# Patient Record
Sex: MALE | Employment: FULL TIME | URBAN - METROPOLITAN AREA
[De-identification: names, ages, dates, MRNs, and addresses within clinical notes are randomized per-mention and may not be internally consistent; named-entity substitution may affect disease eponyms.]

---

## 2020-03-13 ENCOUNTER — HOSPITAL ENCOUNTER (INPATIENT)
Age: 65
LOS: 16 days | Discharge: HOME HEALTH | DRG: 233 | End: 2020-03-29
Attending: INTERNAL MEDICINE | Admitting: INTERNAL MEDICINE
Payer: COMMERCIAL

## 2020-03-13 ENCOUNTER — APPOINTMENT (OUTPATIENT)
Dept: GENERAL RADIOLOGY | Age: 65
DRG: 233 | End: 2020-03-13
Attending: INTERNAL MEDICINE
Payer: COMMERCIAL

## 2020-03-13 PROBLEM — I10 HTN (HYPERTENSION): Status: ACTIVE | Noted: 2020-03-13

## 2020-03-13 PROBLEM — E11.65 UNCONTROLLED TYPE 2 DIABETES MELLITUS WITH HYPERGLYCEMIA (HCC): Status: ACTIVE | Noted: 2020-03-13

## 2020-03-13 PROBLEM — I50.9 CONGESTIVE HEART FAILURE OF UNKNOWN ETIOLOGY (HCC): Status: ACTIVE | Noted: 2020-03-13

## 2020-03-13 PROBLEM — I25.10 CAD (CORONARY ARTERY DISEASE): Status: ACTIVE | Noted: 2020-03-13

## 2020-03-13 PROBLEM — J96.01 ACUTE RESPIRATORY FAILURE WITH HYPOXIA (HCC): Status: ACTIVE | Noted: 2020-03-13

## 2020-03-13 PROBLEM — I50.21 ACUTE SYSTOLIC (CONGESTIVE) HEART FAILURE (HCC): Status: ACTIVE | Noted: 2020-03-13

## 2020-03-13 PROBLEM — I21.4 NSTEMI (NON-ST ELEVATED MYOCARDIAL INFARCTION) (HCC): Status: ACTIVE | Noted: 2020-03-13

## 2020-03-13 PROBLEM — J69.0 ASPIRATION PNEUMONIA (HCC): Status: ACTIVE | Noted: 2020-03-13

## 2020-03-13 LAB
ANION GAP SERPL CALCULATED.3IONS-SCNC: 10 MMOL/L (ref 3–16)
BASE EXCESS ARTERIAL: -0.1 MMOL/L (ref -3–3)
BASOPHILS ABSOLUTE: 0 K/UL (ref 0–0.2)
BASOPHILS RELATIVE PERCENT: 0.2 %
BUN BLDV-MCNC: 14 MG/DL (ref 7–20)
CALCIUM SERPL-MCNC: 8.1 MG/DL (ref 8.3–10.6)
CARBOXYHEMOGLOBIN ARTERIAL: 0.7 % (ref 0–1.5)
CHLORIDE BLD-SCNC: 107 MMOL/L (ref 99–110)
CO2: 25 MMOL/L (ref 21–32)
CREAT SERPL-MCNC: 0.8 MG/DL (ref 0.8–1.3)
EOSINOPHILS ABSOLUTE: 0.3 K/UL (ref 0–0.6)
EOSINOPHILS RELATIVE PERCENT: 2.8 %
GFR AFRICAN AMERICAN: >60
GFR NON-AFRICAN AMERICAN: >60
GLUCOSE BLD-MCNC: 118 MG/DL (ref 70–99)
GLUCOSE BLD-MCNC: 124 MG/DL (ref 70–99)
GLUCOSE BLD-MCNC: 155 MG/DL (ref 70–99)
HCO3 ARTERIAL: 25.6 MMOL/L (ref 21–29)
HCT VFR BLD CALC: 38.8 % (ref 40.5–52.5)
HEMOGLOBIN, ART, EXTENDED: 13.1 G/DL (ref 13.5–17.5)
HEMOGLOBIN: 13 G/DL (ref 13.5–17.5)
INR BLD: 1.14 (ref 0.86–1.14)
LACTIC ACID: 0.7 MMOL/L (ref 0.4–2)
LYMPHOCYTES ABSOLUTE: 0.9 K/UL (ref 1–5.1)
LYMPHOCYTES RELATIVE PERCENT: 9.5 %
MCH RBC QN AUTO: 30.7 PG (ref 26–34)
MCHC RBC AUTO-ENTMCNC: 33.6 G/DL (ref 31–36)
MCV RBC AUTO: 91.4 FL (ref 80–100)
METHEMOGLOBIN ARTERIAL: 0.3 %
MONOCYTES ABSOLUTE: 1 K/UL (ref 0–1.3)
MONOCYTES RELATIVE PERCENT: 10.5 %
NEUTROPHILS ABSOLUTE: 7.1 K/UL (ref 1.7–7.7)
NEUTROPHILS RELATIVE PERCENT: 77 %
O2 CONTENT ARTERIAL: 18 ML/DL
O2 SAT, ARTERIAL: 96.9 %
O2 THERAPY: ABNORMAL
PCO2 ARTERIAL: 45.1 MMHG (ref 35–45)
PDW BLD-RTO: 13.4 % (ref 12.4–15.4)
PERFORMED ON: ABNORMAL
PERFORMED ON: ABNORMAL
PH ARTERIAL: 7.36 (ref 7.35–7.45)
PLATELET # BLD: 190 K/UL (ref 135–450)
PMV BLD AUTO: 9.1 FL (ref 5–10.5)
PO2 ARTERIAL: 89 MMHG (ref 75–108)
POTASSIUM REFLEX MAGNESIUM: 4.3 MMOL/L (ref 3.5–5.1)
PRO-BNP: 501 PG/ML (ref 0–124)
PROTHROMBIN TIME: 13.2 SEC (ref 10–13.2)
RBC # BLD: 4.25 M/UL (ref 4.2–5.9)
SODIUM BLD-SCNC: 142 MMOL/L (ref 136–145)
TCO2 ARTERIAL: 60.5 MMOL/L
TROPONIN: 0.26 NG/ML
WBC # BLD: 9.2 K/UL (ref 4–11)

## 2020-03-13 PROCEDURE — 94750 HC PULMONARY COMPLIANCE STUDY: CPT

## 2020-03-13 PROCEDURE — 5A1955Z RESPIRATORY VENTILATION, GREATER THAN 96 CONSECUTIVE HOURS: ICD-10-PCS | Performed by: INTERNAL MEDICINE

## 2020-03-13 PROCEDURE — 71045 X-RAY EXAM CHEST 1 VIEW: CPT

## 2020-03-13 PROCEDURE — 85025 COMPLETE CBC W/AUTO DIFF WBC: CPT

## 2020-03-13 PROCEDURE — 87205 SMEAR GRAM STAIN: CPT

## 2020-03-13 PROCEDURE — 2000000000 HC ICU R&B

## 2020-03-13 PROCEDURE — 99291 CRITICAL CARE FIRST HOUR: CPT | Performed by: INTERNAL MEDICINE

## 2020-03-13 PROCEDURE — 87040 BLOOD CULTURE FOR BACTERIA: CPT

## 2020-03-13 PROCEDURE — 93005 ELECTROCARDIOGRAM TRACING: CPT | Performed by: INTERNAL MEDICINE

## 2020-03-13 PROCEDURE — 82803 BLOOD GASES ANY COMBINATION: CPT

## 2020-03-13 PROCEDURE — 2500000003 HC RX 250 WO HCPCS: Performed by: INTERNAL MEDICINE

## 2020-03-13 PROCEDURE — 2580000003 HC RX 258: Performed by: INTERNAL MEDICINE

## 2020-03-13 PROCEDURE — 80048 BASIC METABOLIC PNL TOTAL CA: CPT

## 2020-03-13 PROCEDURE — 94761 N-INVAS EAR/PLS OXIMETRY MLT: CPT

## 2020-03-13 PROCEDURE — 83036 HEMOGLOBIN GLYCOSYLATED A1C: CPT

## 2020-03-13 PROCEDURE — 94002 VENT MGMT INPAT INIT DAY: CPT

## 2020-03-13 PROCEDURE — 87070 CULTURE OTHR SPECIMN AEROBIC: CPT

## 2020-03-13 PROCEDURE — 85610 PROTHROMBIN TIME: CPT

## 2020-03-13 PROCEDURE — 6360000002 HC RX W HCPCS: Performed by: INTERNAL MEDICINE

## 2020-03-13 PROCEDURE — 83880 ASSAY OF NATRIURETIC PEPTIDE: CPT

## 2020-03-13 PROCEDURE — 83605 ASSAY OF LACTIC ACID: CPT

## 2020-03-13 PROCEDURE — 84484 ASSAY OF TROPONIN QUANT: CPT

## 2020-03-13 RX ORDER — POLYETHYLENE GLYCOL 3350 17 G/17G
17 POWDER, FOR SOLUTION ORAL DAILY PRN
Status: DISCONTINUED | OUTPATIENT
Start: 2020-03-13 | End: 2020-03-24

## 2020-03-13 RX ORDER — POTASSIUM CHLORIDE 7.45 MG/ML
10 INJECTION INTRAVENOUS PRN
Status: DISCONTINUED | OUTPATIENT
Start: 2020-03-13 | End: 2020-03-18 | Stop reason: DRUGHIGH

## 2020-03-13 RX ORDER — ONDANSETRON 2 MG/ML
4 INJECTION INTRAMUSCULAR; INTRAVENOUS EVERY 6 HOURS PRN
Status: DISCONTINUED | OUTPATIENT
Start: 2020-03-13 | End: 2020-03-24

## 2020-03-13 RX ORDER — ACETAMINOPHEN 650 MG/1
650 SUPPOSITORY RECTAL EVERY 6 HOURS PRN
Status: DISCONTINUED | OUTPATIENT
Start: 2020-03-13 | End: 2020-03-24

## 2020-03-13 RX ORDER — INSULIN LISPRO 100 [IU]/ML
0-12 INJECTION, SOLUTION INTRAVENOUS; SUBCUTANEOUS EVERY 4 HOURS
Status: DISCONTINUED | OUTPATIENT
Start: 2020-03-13 | End: 2020-03-16

## 2020-03-13 RX ORDER — SODIUM CHLORIDE 0.9 % (FLUSH) 0.9 %
10 SYRINGE (ML) INJECTION PRN
Status: DISCONTINUED | OUTPATIENT
Start: 2020-03-13 | End: 2020-03-24

## 2020-03-13 RX ORDER — ACETAMINOPHEN 325 MG/1
650 TABLET ORAL EVERY 6 HOURS PRN
Status: DISCONTINUED | OUTPATIENT
Start: 2020-03-13 | End: 2020-03-24

## 2020-03-13 RX ORDER — SODIUM CHLORIDE 9 MG/ML
INJECTION, SOLUTION INTRAVENOUS CONTINUOUS
Status: DISCONTINUED | OUTPATIENT
Start: 2020-03-13 | End: 2020-03-13

## 2020-03-13 RX ORDER — FUROSEMIDE 10 MG/ML
20 INJECTION INTRAMUSCULAR; INTRAVENOUS 2 TIMES DAILY
Status: DISCONTINUED | OUTPATIENT
Start: 2020-03-14 | End: 2020-03-14

## 2020-03-13 RX ORDER — DEXTROSE MONOHYDRATE 50 MG/ML
100 INJECTION, SOLUTION INTRAVENOUS PRN
Status: DISCONTINUED | OUTPATIENT
Start: 2020-03-13 | End: 2020-03-24

## 2020-03-13 RX ORDER — LORAZEPAM 2 MG/ML
2 INJECTION INTRAMUSCULAR
Status: DISCONTINUED | OUTPATIENT
Start: 2020-03-13 | End: 2020-03-18

## 2020-03-13 RX ORDER — DEXTROSE MONOHYDRATE 25 G/50ML
12.5 INJECTION, SOLUTION INTRAVENOUS PRN
Status: DISCONTINUED | OUTPATIENT
Start: 2020-03-13 | End: 2020-03-24

## 2020-03-13 RX ORDER — PROPOFOL 10 MG/ML
10 INJECTION, EMULSION INTRAVENOUS
Status: DISCONTINUED | OUTPATIENT
Start: 2020-03-13 | End: 2020-03-18

## 2020-03-13 RX ORDER — NICOTINE POLACRILEX 4 MG
15 LOZENGE BUCCAL PRN
Status: DISCONTINUED | OUTPATIENT
Start: 2020-03-13 | End: 2020-03-16

## 2020-03-13 RX ORDER — MAGNESIUM SULFATE IN WATER 40 MG/ML
2 INJECTION, SOLUTION INTRAVENOUS PRN
Status: DISCONTINUED | OUTPATIENT
Start: 2020-03-13 | End: 2020-03-23

## 2020-03-13 RX ORDER — PROMETHAZINE HYDROCHLORIDE 25 MG/1
12.5 TABLET ORAL EVERY 6 HOURS PRN
Status: DISCONTINUED | OUTPATIENT
Start: 2020-03-13 | End: 2020-03-24

## 2020-03-13 RX ORDER — SODIUM CHLORIDE 0.9 % (FLUSH) 0.9 %
10 SYRINGE (ML) INJECTION EVERY 12 HOURS SCHEDULED
Status: DISCONTINUED | OUTPATIENT
Start: 2020-03-13 | End: 2020-03-24

## 2020-03-13 RX ADMIN — PROPOFOL 20 MCG/KG/MIN: 10 INJECTION, EMULSION INTRAVENOUS at 20:37

## 2020-03-13 RX ADMIN — Medication 2 MCG/MIN: at 23:19

## 2020-03-13 RX ADMIN — PIPERACILLIN AND TAZOBACTAM 3.38 G: 3; .375 INJECTION, POWDER, LYOPHILIZED, FOR SOLUTION INTRAVENOUS at 22:22

## 2020-03-13 RX ADMIN — Medication 10 ML: at 22:24

## 2020-03-13 RX ADMIN — FENTANYL CITRATE 1 MCG/KG/HR: 50 INJECTION, SOLUTION INTRAMUSCULAR; INTRAVENOUS at 20:38

## 2020-03-13 RX ADMIN — SODIUM CHLORIDE: 9 INJECTION, SOLUTION INTRAVENOUS at 20:44

## 2020-03-13 RX ADMIN — FAMOTIDINE 20 MG: 10 INJECTION INTRAVENOUS at 22:23

## 2020-03-13 ASSESSMENT — PULMONARY FUNCTION TESTS
PIF_VALUE: 23
PIF_VALUE: 21
PIF_VALUE: 23
PIF_VALUE: 29

## 2020-03-14 LAB
ANION GAP SERPL CALCULATED.3IONS-SCNC: 9 MMOL/L (ref 3–16)
BASOPHILS ABSOLUTE: 0 K/UL (ref 0–0.2)
BASOPHILS RELATIVE PERCENT: 0.3 %
BUN BLDV-MCNC: 12 MG/DL (ref 7–20)
CALCIUM SERPL-MCNC: 8 MG/DL (ref 8.3–10.6)
CHLORIDE BLD-SCNC: 107 MMOL/L (ref 99–110)
CO2: 25 MMOL/L (ref 21–32)
CREAT SERPL-MCNC: 0.8 MG/DL (ref 0.8–1.3)
EKG ATRIAL RATE: 77 BPM
EKG DIAGNOSIS: NORMAL
EKG P AXIS: 31 DEGREES
EKG P-R INTERVAL: 178 MS
EKG Q-T INTERVAL: 400 MS
EKG QRS DURATION: 94 MS
EKG QTC CALCULATION (BAZETT): 452 MS
EKG R AXIS: 19 DEGREES
EKG T AXIS: 147 DEGREES
EKG VENTRICULAR RATE: 77 BPM
EOSINOPHILS ABSOLUTE: 0.5 K/UL (ref 0–0.6)
EOSINOPHILS RELATIVE PERCENT: 5.1 %
GFR AFRICAN AMERICAN: >60
GFR NON-AFRICAN AMERICAN: >60
GLUCOSE BLD-MCNC: 126 MG/DL (ref 70–99)
GLUCOSE BLD-MCNC: 136 MG/DL (ref 70–99)
GLUCOSE BLD-MCNC: 146 MG/DL (ref 70–99)
GLUCOSE BLD-MCNC: 153 MG/DL (ref 70–99)
GLUCOSE BLD-MCNC: 159 MG/DL (ref 70–99)
GLUCOSE BLD-MCNC: 226 MG/DL (ref 70–99)
HCT VFR BLD CALC: 37.5 % (ref 40.5–52.5)
HEMOGLOBIN: 12.5 G/DL (ref 13.5–17.5)
LV EF: 40 %
LVEF MODALITY: NORMAL
LYMPHOCYTES ABSOLUTE: 1.6 K/UL (ref 1–5.1)
LYMPHOCYTES RELATIVE PERCENT: 17.3 %
MCH RBC QN AUTO: 30.7 PG (ref 26–34)
MCHC RBC AUTO-ENTMCNC: 33.4 G/DL (ref 31–36)
MCV RBC AUTO: 91.9 FL (ref 80–100)
MONOCYTES ABSOLUTE: 0.9 K/UL (ref 0–1.3)
MONOCYTES RELATIVE PERCENT: 10.1 %
NEUTROPHILS ABSOLUTE: 6.1 K/UL (ref 1.7–7.7)
NEUTROPHILS RELATIVE PERCENT: 67.2 %
PDW BLD-RTO: 13.1 % (ref 12.4–15.4)
PERFORMED ON: ABNORMAL
PLATELET # BLD: 214 K/UL (ref 135–450)
PMV BLD AUTO: 9.3 FL (ref 5–10.5)
POTASSIUM REFLEX MAGNESIUM: 4 MMOL/L (ref 3.5–5.1)
RBC # BLD: 4.08 M/UL (ref 4.2–5.9)
SODIUM BLD-SCNC: 141 MMOL/L (ref 136–145)
WBC # BLD: 9.1 K/UL (ref 4–11)

## 2020-03-14 PROCEDURE — 94003 VENT MGMT INPAT SUBQ DAY: CPT

## 2020-03-14 PROCEDURE — 80048 BASIC METABOLIC PNL TOTAL CA: CPT

## 2020-03-14 PROCEDURE — 6360000002 HC RX W HCPCS: Performed by: INTERNAL MEDICINE

## 2020-03-14 PROCEDURE — 85025 COMPLETE CBC W/AUTO DIFF WBC: CPT

## 2020-03-14 PROCEDURE — 2700000000 HC OXYGEN THERAPY PER DAY

## 2020-03-14 PROCEDURE — 99291 CRITICAL CARE FIRST HOUR: CPT | Performed by: INTERNAL MEDICINE

## 2020-03-14 PROCEDURE — C8929 TTE W OR WO FOL WCON,DOPPLER: HCPCS

## 2020-03-14 PROCEDURE — 2500000003 HC RX 250 WO HCPCS: Performed by: INTERNAL MEDICINE

## 2020-03-14 PROCEDURE — 2000000000 HC ICU R&B

## 2020-03-14 PROCEDURE — 2580000003 HC RX 258: Performed by: INTERNAL MEDICINE

## 2020-03-14 PROCEDURE — 94750 HC PULMONARY COMPLIANCE STUDY: CPT

## 2020-03-14 PROCEDURE — 94770 HC ETCO2 MONITOR DAILY: CPT

## 2020-03-14 PROCEDURE — 93010 ELECTROCARDIOGRAM REPORT: CPT | Performed by: INTERNAL MEDICINE

## 2020-03-14 PROCEDURE — 6370000000 HC RX 637 (ALT 250 FOR IP): Performed by: INTERNAL MEDICINE

## 2020-03-14 PROCEDURE — 94761 N-INVAS EAR/PLS OXIMETRY MLT: CPT

## 2020-03-14 RX ORDER — FUROSEMIDE 10 MG/ML
40 INJECTION INTRAMUSCULAR; INTRAVENOUS 2 TIMES DAILY
Status: DISCONTINUED | OUTPATIENT
Start: 2020-03-14 | End: 2020-03-17

## 2020-03-14 RX ADMIN — FAMOTIDINE 20 MG: 10 INJECTION INTRAVENOUS at 20:53

## 2020-03-14 RX ADMIN — PIPERACILLIN AND TAZOBACTAM 3.38 G: 3; .375 INJECTION, POWDER, LYOPHILIZED, FOR SOLUTION INTRAVENOUS at 13:17

## 2020-03-14 RX ADMIN — FUROSEMIDE 40 MG: 10 INJECTION, SOLUTION INTRAMUSCULAR; INTRAVENOUS at 16:18

## 2020-03-14 RX ADMIN — INSULIN LISPRO 2 UNITS: 100 INJECTION, SOLUTION INTRAVENOUS; SUBCUTANEOUS at 12:14

## 2020-03-14 RX ADMIN — FENTANYL CITRATE 1 MCG/KG/HR: 50 INJECTION, SOLUTION INTRAMUSCULAR; INTRAVENOUS at 19:13

## 2020-03-14 RX ADMIN — Medication 10 ML: at 09:18

## 2020-03-14 RX ADMIN — FENTANYL CITRATE 1 MCG/KG/HR: 50 INJECTION, SOLUTION INTRAMUSCULAR; INTRAVENOUS at 06:34

## 2020-03-14 RX ADMIN — FAMOTIDINE 20 MG: 10 INJECTION INTRAVENOUS at 09:17

## 2020-03-14 RX ADMIN — PROPOFOL 30 MCG/KG/MIN: 10 INJECTION, EMULSION INTRAVENOUS at 21:02

## 2020-03-14 RX ADMIN — Medication 2 MCG/MIN: at 16:51

## 2020-03-14 RX ADMIN — INSULIN LISPRO 4 UNITS: 100 INJECTION, SOLUTION INTRAVENOUS; SUBCUTANEOUS at 20:59

## 2020-03-14 RX ADMIN — PROPOFOL 30 MCG/KG/MIN: 10 INJECTION, EMULSION INTRAVENOUS at 01:47

## 2020-03-14 RX ADMIN — PIPERACILLIN AND TAZOBACTAM 3.38 G: 3; .375 INJECTION, POWDER, LYOPHILIZED, FOR SOLUTION INTRAVENOUS at 04:51

## 2020-03-14 RX ADMIN — PROPOFOL 30 MCG/KG/MIN: 10 INJECTION, EMULSION INTRAVENOUS at 16:18

## 2020-03-14 RX ADMIN — ENOXAPARIN SODIUM 40 MG: 40 INJECTION SUBCUTANEOUS at 09:17

## 2020-03-14 RX ADMIN — FUROSEMIDE 20 MG: 10 INJECTION, SOLUTION INTRAMUSCULAR; INTRAVENOUS at 09:17

## 2020-03-14 RX ADMIN — Medication 10 ML: at 20:54

## 2020-03-14 RX ADMIN — INSULIN LISPRO 2 UNITS: 100 INJECTION, SOLUTION INTRAVENOUS; SUBCUTANEOUS at 16:18

## 2020-03-14 ASSESSMENT — PULMONARY FUNCTION TESTS
PIF_VALUE: 22
PIF_VALUE: 19
PIF_VALUE: 25
PIF_VALUE: 24
PIF_VALUE: 23
PIF_VALUE: 26
PIF_VALUE: 18
PIF_VALUE: 26

## 2020-03-14 NOTE — PROGRESS NOTES
Piggyback:50]  Out: 7586 [Urine:1125; Emesis/NG output:40]  I/O this shift:  In: -   Out: 1275 [Urine:1275]    General Appearance: Intubated and sedated, in no acute distress  Skin: warm and dry, no rash or erythema  Head: normocephalic and atraumatic  Eyes: pupils equal, round, and reactive to light, extraocular eye movements intact, conjunctivae normal  ENT: external ear and ear canal normal bilaterally, nose without deformity, nasal mucosa and turbinates normal  Neck: supple and non-tender without mass, no cervical lymphadenopathy  Pulmonary/Chest: clear to auscultation bilaterally- no wheezes, rales or rhonchi, normal air movement, no respiratory distress  Cardiovascular: normal rate, regular rhythm,  no murmurs, rubs, distal pulses intact, no carotid bruits  Abdomen: soft, non-tender, non-distended, normal bowel sounds, no masses or organomegaly  Lymph Nodes: Cervical, supraclavicular normal  Extremities: no cyanosis, clubbing or edema  Musculoskeletal: normal range of motion, no joint swelling, deformity or tenderness  Neurologic: Intubated, no focal neurologic deficits    Data Review:  CBC:   Lab Results   Component Value Date    WBC 9.1 03/14/2020    RBC 4.08 03/14/2020     BMP:   Lab Results   Component Value Date    GLUCOSE 146 03/14/2020    CO2 25 03/14/2020    BUN 12 03/14/2020    CREATININE 0.8 03/14/2020    CALCIUM 8.0 03/14/2020     ABG:   Lab Results   Component Value Date    RSH9GWE 25.6 03/13/2020    BEART -0.1 03/13/2020    I3QBPEQU 96.9 03/13/2020    PHART 7.363 03/13/2020    XAS2TML 45.1 03/13/2020    PO2ART 89.0 03/13/2020    WAF4ZOT 60.5 03/13/2020       Radiology: All pertinent images / reports were reviewed as a part of this visit.     Narrative   EXAMINATION:   ONE XRAY VIEW OF THE CHEST       3/13/2020 7:44 pm       COMPARISON:   None.       HISTORY:   ORDERING SYSTEM PROVIDED HISTORY: SOB , intubated   TECHNOLOGIST PROVIDED HISTORY:   Reason for exam:->SOB , intubated   Reason for Exam: SOB , intubated   Acuity: Acute   Type of Exam: Subsequent/Follow-up       FINDINGS:   SUPPORT DEVICES:       Endotracheal tube in place with tip terminating approximately 2.5 cm above   the deric.       Enteric tube noted with side holes terminating at the GE junction, recommend   advancing by 5 cm.       Right upper extremity PICC noted with tip terminating at the cavoatrial   junction.       Multiple leads overlying the right lower chest somewhat limit evaluation.       The cardiopericardial silhouette is mildly enlarged.  There is perihilar   fullness with bibasilar opacities likely reflecting pleural effusions,   moderate on the right and small on the left.  Overall radiographic findings   are most suggestive of pulmonary edema/CHF.       There is no pneumothorax.           Impression   1. Radiographic findings most suggestive of pulmonary edema/CHF. 2. Recommend advancing enteric tube by 5 cm. 3. Additional support devices as described above. Problem List:   NSTEMI/triple-vessel disease  Pulmonary edema  Acute hypoxic respiratory failure  Assessment/Plan:     Acute hypoxic respiratory failure related to pulmonary edema as noted on chest x-ray. Continue with IV diuresis, increase Lasix to 40 mg twice daily. Adequate urine output noted. Continue on volume assist control mode of ventilation which patient is tolerating well. Repeat chest x-ray tomorrow. Will need to clarify plans for surgery prior to extubation. Continue with spontaneous breathing trial and awakening trials. Acute inferior MI/triple-vessel disease, with pulmonary edema. Cardiac cath performed at [facility]. ? Need for revascularization of RCA and LAD vs CABG. Cardiology will discuss with CT surgery. EF of 25 to 30%. Doubt aspiration pneumonia, stop antibiotics. Lovenox and Pepcid for prophylaxis. Critical care team will follow.     Roni Irizarry MD     Critical care time of 35 minutes excluding procedures

## 2020-03-14 NOTE — CONSULTS
Pulmonary Consult    Patient's PCP: No primary care provider on file. Referred by: Vannesa Bishop MD for respiratory failure     HISTORY OF PRESENT ILLNESS:    This is a  60 y.o. male, presented to an outside hospital two days ago with chest pain and SOB progressively got worse over 1 week and was found to have CHF and elevated troponin for which he was intubated. He had left heart cath which showed three vessel disease for which he was transferred to us on vent for evaluation for GABG. At the time of evaluation he was sedated with ativan 3 mg/hr. He open eyes to touch and trach but does not follow commands. Past Medical / Surgical History:   No past medical history on file. No past surgical history on file. Medications Prior to Admission:    No current facility-administered medications on file prior to encounter. No current outpatient medications on file prior to encounter. Allergies:  Patient has no allergy information on record. Social History:   TOBACCO:   has no history on file for tobacco.     ETOH:   has no history on file for alcohol. Family History:   No family history on file. Review of Systems   Unable to perform ROS: Intubated       PHYSICAL EXAM:  /87   Pulse 88   Temp 98.2 °F (36.8 °C) (Temporal)   Resp 16   Wt 186 lb 1.1 oz (84.4 kg)   SpO2 98%     Physical Exam  Vitals signs reviewed. Constitutional:       General: He is not in acute distress. Appearance: He is well-developed. He is not ill-appearing. Comments: Intubated and sedated   HENT:      Head: Normocephalic and atraumatic. Eyes:      Pupils: Pupils are equal, round, and reactive to light. Neck:      Musculoskeletal: Neck supple. Vascular: No JVD. Cardiovascular:      Rate and Rhythm: Normal rate and regular rhythm. Heart sounds: No murmur. Pulmonary:      Effort: Pulmonary effort is normal. No respiratory distress. Breath sounds: Normal breath sounds.  No stridor. No wheezing or rales. Abdominal:      General: Bowel sounds are normal.      Palpations: Abdomen is soft. Musculoskeletal:         General: No deformity. Right lower leg: No edema. Left lower leg: No edema. Skin:     General: Skin is warm and dry. Neurological:      Comments: Non focal exam         LABS:  Recent Labs     03/13/20 2030   WBC 9.2   HGB 13.0*   HCT 38.8*                                                                     Recent Labs     03/13/20 2030      K 4.3      CO2 25   BUN 14   CREATININE 0.8   GLUCOSE 155*     No results for input(s): AST, ALT, ALB, BILITOT, ALKPHOS in the last 72 hours. Recent Labs     03/13/20 2030   TROPONINI 0.26*     No results for input(s): BNP in the last 72 hours.   No results found for: PHART, PEV1FRG, PO2ART  Recent Labs     03/13/20 2030   INR 1.14     @LABRCNT(NITRITE,COLORU,PHUR,LABCAST,WBCUA,RBCUA,MUCUS,TRICHOMONAS,YEAST,BACTERIA,CLARITYU,SPECGRAV,LEUKOCYTESUR,UROBILINOGEN,BILIRUBINUR,BLOODU,GLUCOSEU,KETONESU,AMORPHOUS)@     DATA:  CXR with bilateral pleural effusion     Assessment &Plan:    Patient Active Problem List:     Congestive heart failure of unknown etiology ([facility] [address]     3-vessel coronary artery disease     NSTEMI (non-ST elevated myocardial infarction) ([facility] [address]     HTN (hypertension)     Uncontrolled type 2 diabetes mellitus with hyperglycemia (HCC)     Acute systolic (congestive) heart failure (HCC)     Acute respiratory failure with hypoxia (HCC)     Aspiration pneumonia ([facility] [address]      Acute respiratory failure on mechanical vent support   NSTEMI /  three vessel disease is here for evaluation for CABG  Bilateral pleural effusion   Systolic CHF with EF of 76%  Renal function is stable   No leukocytosis   Hyperglycemia     Continue vent support: increase VT to 450, decrease rate to 14, decrease PEEP to 5  D/c lorazepam   Start propofol and fentanyl  Lasix 20 mg BID  Recheck ABG  SSI  Consult dietician for TF

## 2020-03-14 NOTE — PROGRESS NOTES
Asked Dr. Max Escamilla about anticoagulation for patient.  Was notified to wait on the ECHO and will notify  RN

## 2020-03-14 NOTE — PROGRESS NOTES
Secure message sent to [facility] regarding use of PICC line for infusion. OK per hosp as he visualized CXR after being taken.

## 2020-03-14 NOTE — PLAN OF CARE
Nutrition Problem: Inadequate oral intake  Intervention: Food and/or Nutrient Delivery: Continue NPO, Start Tube Feeding  Nutritional Goals: EN tolerance @ goal rate

## 2020-03-14 NOTE — PROGRESS NOTES
due to medical condition, Intubation, Nutrition support - EN    Objective Information:  · Nutrition-Focused Physical Findings: active BS; no edema noted  · Wound Type: None  · Current Nutrition Therapies:  · Oral Diet Orders: NPO   · Oral Diet intake: NPO  · Oral Nutrition Supplement (ONS) Orders: None  · ONS intake: NPO  · Goal TF & Flush Orders Provides: Osmolite 1.2 @ goal rate of 75 ml/hr provides 1800 ml; 2160 kcal, 100 g pro & 1476 ml free water.   Additional 115 ml water flush q 4 hr for daily fluid needs  · Additional Calories:  266 kcal from propofol (10.1ml/hr)  · Anthropometric Measures:  · Ht: 5' 7.5\" (171.5 cm)   · Current Body Wt: 180 lb (81.6 kg)  · Ideal Body Wt: 151 lb (68.5 kg),  · BMI Classification: BMI 25.0 - 29.9 Overweight(27.7)    Nutrition Interventions:   Continue NPO, Start Tube Feeding  Continued Inpatient Monitoring, Education Not Indicated    Nutrition Evaluation:   · Evaluation: Goals set   · Goals: EN tolerance @ goal rate   · Monitoring: Nutrition Progression, TF Intake, TF Tolerance, Weight, Pertinent Labs      Electronically signed by Maurilio Andrews RD, LD on 3/14/20 at 12:01 PM EDT    Contact Number: [phone]

## 2020-03-14 NOTE — PROGRESS NOTES
Patient off of propofol since 0915. Patient is restless, moving all extremities, turning head side to side and coughing. Patient did not follow any commands.

## 2020-03-14 NOTE — FLOWSHEET NOTE
Late entry: pt arrived to unit. VSS. Cardiology and hospitalist notified. Hospitalist assessed pt at bedside. Orders placed. Will continue to monitor.  Electronically signed by Mere Mckinney RN on 3/13/2020 at 8:11 PM  BP (!) 98/58   Pulse 79   Temp 98.2 °F (36.8 °C) (Temporal)   Resp 14   Wt 186 lb 1.1 oz (84.4 kg)   SpO2 96%

## 2020-03-14 NOTE — PROGRESS NOTES
[facility]    3/14/2020 8:41 AM        Name: Mera Charles . Admitted: 3/13/2020  Primary Care Provider: No primary care provider on file. (Tel: None)      Subjective:    Patient intubated and sedated.      Reviewed interval ancillary notes    Current Medications  perflutren lipid microspheres (DEFINITY) injection 1.65 mg, ONCE PRN  sodium chloride flush 0.9 % injection 10 mL, 2 times per day  sodium chloride flush 0.9 % injection 10 mL, PRN  acetaminophen (TYLENOL) tablet 650 mg, Q6H PRN    Or  acetaminophen (TYLENOL) suppository 650 mg, Q6H PRN  polyethylene glycol (GLYCOLAX) packet 17 g, Daily PRN  promethazine (PHENERGAN) tablet 12.5 mg, Q6H PRN    Or  ondansetron (ZOFRAN) injection 4 mg, Q6H PRN  enoxaparin (LOVENOX) injection 40 mg, Daily  magnesium sulfate 2 g in 50 mL IVPB premix, PRN  potassium chloride 10 mEq/100 mL IVPB (Peripheral Line), PRN  fentaNYL (SUBLIMAZE) 1,000 mcg in sodium chloride 0.9 % 100 mL infusion, Continuous  glucose (GLUTOSE) 40 % oral gel 15 g, PRN  dextrose 50 % IV solution, PRN  glucagon (rDNA) injection 1 mg, PRN  dextrose 5 % solution, PRN  insulin lispro (1 Unit Dial) 0-12 Units, Q4H  LORazepam (ATIVAN) injection 2 mg, Q2H PRN  propofol injection, Titrated  piperacillin-tazobactam (ZOSYN) 3.375 g in dextrose 5 % 50 mL IVPB extended infusion (mini-bag), Q8H  furosemide (LASIX) injection 20 mg, BID  famotidine (PEPCID) injection 20 mg, BID  norepinephrine (LEVOPHED) 16 mg in dextrose 5% 250 mL infusion, Continuous        Objective:  /60   Pulse 62   Temp 98.6 °F (37 °C) (Temporal)   Resp 10   Wt 180 lb 1.9 oz (81.7 kg)   SpO2 96%     Intake/Output Summary (Last 24 hours) at 3/14/2020 0841  Last data filed at 3/14/2020 0635  Gross per 24 hour   Intake 426 ml   Output 1165 ml   Net -739 ml      Wt Readings from Last 3 Encounters:   03/14/20 180 lb 1.9 oz (81.7 Metabolic: sedation vacation today and assess if no improvement MRI Brain today    Hypotension secondary to propofol:  Continue levophed to keep map > 65    Dm2: ssi            Diet: Diet NPO Effective Now  Code:Full Code  DVT PPXlovenox  Disposition  Pending extubation      Gavin Williamson MD   3/14/2020 8:41 AM

## 2020-03-15 ENCOUNTER — APPOINTMENT (OUTPATIENT)
Dept: GENERAL RADIOLOGY | Age: 65
DRG: 233 | End: 2020-03-15
Attending: INTERNAL MEDICINE
Payer: COMMERCIAL

## 2020-03-15 LAB
ANION GAP SERPL CALCULATED.3IONS-SCNC: 13 MMOL/L (ref 3–16)
BASOPHILS ABSOLUTE: 0.1 K/UL (ref 0–0.2)
BASOPHILS RELATIVE PERCENT: 0.9 %
BUN BLDV-MCNC: 13 MG/DL (ref 7–20)
CALCIUM SERPL-MCNC: 8.4 MG/DL (ref 8.3–10.6)
CHLORIDE BLD-SCNC: 101 MMOL/L (ref 99–110)
CO2: 28 MMOL/L (ref 21–32)
CREAT SERPL-MCNC: 0.8 MG/DL (ref 0.8–1.3)
EOSINOPHILS ABSOLUTE: 0.5 K/UL (ref 0–0.6)
EOSINOPHILS RELATIVE PERCENT: 6.7 %
ESTIMATED AVERAGE GLUCOSE: 208.7 MG/DL
GFR AFRICAN AMERICAN: >60
GFR NON-AFRICAN AMERICAN: >60
GLUCOSE BLD-MCNC: 199 MG/DL (ref 70–99)
GLUCOSE BLD-MCNC: 230 MG/DL (ref 70–99)
GLUCOSE BLD-MCNC: 263 MG/DL (ref 70–99)
GLUCOSE BLD-MCNC: 270 MG/DL (ref 70–99)
GLUCOSE BLD-MCNC: 294 MG/DL (ref 70–99)
GLUCOSE BLD-MCNC: 321 MG/DL (ref 70–99)
HBA1C MFR BLD: 8.9 %
HCT VFR BLD CALC: 40.4 % (ref 40.5–52.5)
HEMOGLOBIN: 13.6 G/DL (ref 13.5–17.5)
LYMPHOCYTES ABSOLUTE: 1.4 K/UL (ref 1–5.1)
LYMPHOCYTES RELATIVE PERCENT: 18 %
MCH RBC QN AUTO: 30.6 PG (ref 26–34)
MCHC RBC AUTO-ENTMCNC: 33.6 G/DL (ref 31–36)
MCV RBC AUTO: 91.1 FL (ref 80–100)
MONOCYTES ABSOLUTE: 1 K/UL (ref 0–1.3)
MONOCYTES RELATIVE PERCENT: 13 %
NEUTROPHILS ABSOLUTE: 4.7 K/UL (ref 1.7–7.7)
NEUTROPHILS RELATIVE PERCENT: 61.4 %
PDW BLD-RTO: 13.1 % (ref 12.4–15.4)
PERFORMED ON: ABNORMAL
PLATELET # BLD: 223 K/UL (ref 135–450)
PMV BLD AUTO: 9.3 FL (ref 5–10.5)
POTASSIUM REFLEX MAGNESIUM: 3.8 MMOL/L (ref 3.5–5.1)
RBC # BLD: 4.43 M/UL (ref 4.2–5.9)
SODIUM BLD-SCNC: 142 MMOL/L (ref 136–145)
WBC # BLD: 7.6 K/UL (ref 4–11)

## 2020-03-15 PROCEDURE — 2500000003 HC RX 250 WO HCPCS: Performed by: INTERNAL MEDICINE

## 2020-03-15 PROCEDURE — 94770 HC ETCO2 MONITOR DAILY: CPT

## 2020-03-15 PROCEDURE — 2700000000 HC OXYGEN THERAPY PER DAY

## 2020-03-15 PROCEDURE — 2580000003 HC RX 258: Performed by: INTERNAL MEDICINE

## 2020-03-15 PROCEDURE — 94750 HC PULMONARY COMPLIANCE STUDY: CPT

## 2020-03-15 PROCEDURE — 6360000002 HC RX W HCPCS: Performed by: INTERNAL MEDICINE

## 2020-03-15 PROCEDURE — 85025 COMPLETE CBC W/AUTO DIFF WBC: CPT

## 2020-03-15 PROCEDURE — 99291 CRITICAL CARE FIRST HOUR: CPT | Performed by: INTERNAL MEDICINE

## 2020-03-15 PROCEDURE — 71045 X-RAY EXAM CHEST 1 VIEW: CPT

## 2020-03-15 PROCEDURE — 2000000000 HC ICU R&B

## 2020-03-15 PROCEDURE — 94003 VENT MGMT INPAT SUBQ DAY: CPT

## 2020-03-15 PROCEDURE — 80048 BASIC METABOLIC PNL TOTAL CA: CPT

## 2020-03-15 RX ORDER — NITROGLYCERIN 20 MG/100ML
5 INJECTION INTRAVENOUS CONTINUOUS
Status: DISCONTINUED | OUTPATIENT
Start: 2020-03-15 | End: 2020-03-16

## 2020-03-15 RX ADMIN — PROPOFOL 40 MCG/KG/MIN: 10 INJECTION, EMULSION INTRAVENOUS at 19:58

## 2020-03-15 RX ADMIN — INSULIN LISPRO 6 UNITS: 100 INJECTION, SOLUTION INTRAVENOUS; SUBCUTANEOUS at 05:45

## 2020-03-15 RX ADMIN — FUROSEMIDE 40 MG: 10 INJECTION, SOLUTION INTRAMUSCULAR; INTRAVENOUS at 21:30

## 2020-03-15 RX ADMIN — ENOXAPARIN SODIUM 40 MG: 40 INJECTION SUBCUTANEOUS at 09:43

## 2020-03-15 RX ADMIN — INSULIN LISPRO 6 UNITS: 100 INJECTION, SOLUTION INTRAVENOUS; SUBCUTANEOUS at 21:16

## 2020-03-15 RX ADMIN — PROPOFOL 35 MCG/KG/MIN: 10 INJECTION, EMULSION INTRAVENOUS at 09:48

## 2020-03-15 RX ADMIN — Medication 10 ML: at 09:41

## 2020-03-15 RX ADMIN — INSULIN LISPRO 6 UNITS: 100 INJECTION, SOLUTION INTRAVENOUS; SUBCUTANEOUS at 17:00

## 2020-03-15 RX ADMIN — PROPOFOL 35 MCG/KG/MIN: 10 INJECTION, EMULSION INTRAVENOUS at 16:30

## 2020-03-15 RX ADMIN — NITROGLYCERIN 50 MCG/MIN: 20 INJECTION INTRAVENOUS at 14:45

## 2020-03-15 RX ADMIN — FENTANYL CITRATE 0.5 MCG/KG/HR: 50 INJECTION, SOLUTION INTRAMUSCULAR; INTRAVENOUS at 09:48

## 2020-03-15 RX ADMIN — INSULIN LISPRO 6 UNITS: 100 INJECTION, SOLUTION INTRAVENOUS; SUBCUTANEOUS at 12:00

## 2020-03-15 RX ADMIN — INSULIN LISPRO 8 UNITS: 100 INJECTION, SOLUTION INTRAVENOUS; SUBCUTANEOUS at 09:40

## 2020-03-15 RX ADMIN — FAMOTIDINE 20 MG: 10 INJECTION INTRAVENOUS at 19:58

## 2020-03-15 RX ADMIN — FUROSEMIDE 40 MG: 10 INJECTION, SOLUTION INTRAMUSCULAR; INTRAVENOUS at 09:41

## 2020-03-15 RX ADMIN — INSULIN LISPRO 2 UNITS: 100 INJECTION, SOLUTION INTRAVENOUS; SUBCUTANEOUS at 00:01

## 2020-03-15 RX ADMIN — DEXMEDETOMIDINE 1 MCG/KG/HR: 100 INJECTION, SOLUTION, CONCENTRATE INTRAVENOUS at 14:45

## 2020-03-15 RX ADMIN — FAMOTIDINE 20 MG: 10 INJECTION INTRAVENOUS at 09:41

## 2020-03-15 RX ADMIN — Medication 10 ML: at 21:30

## 2020-03-15 ASSESSMENT — PULMONARY FUNCTION TESTS
PIF_VALUE: 22
PIF_VALUE: 23
PIF_VALUE: 20
PIF_VALUE: 24
PIF_VALUE: 24
PIF_VALUE: 20

## 2020-03-15 NOTE — PROGRESS NOTES
[facility]    3/15/2020 1:00 PM        Name: Ofelia Martin . Admitted: 3/13/2020  Primary Care Provider: No primary care provider on file. (Tel: None)      Subjective:    Patient intubated and sedated. Not following commands. Having good UOP.      Reviewed interval ancillary notes    Current Medications  perflutren lipid microspheres (DEFINITY) injection 1.65 mg, ONCE PRN  furosemide (LASIX) injection 40 mg, BID  sodium chloride flush 0.9 % injection 10 mL, 2 times per day  sodium chloride flush 0.9 % injection 10 mL, PRN  acetaminophen (TYLENOL) tablet 650 mg, Q6H PRN    Or  acetaminophen (TYLENOL) suppository 650 mg, Q6H PRN  polyethylene glycol (GLYCOLAX) packet 17 g, Daily PRN  promethazine (PHENERGAN) tablet 12.5 mg, Q6H PRN    Or  ondansetron (ZOFRAN) injection 4 mg, Q6H PRN  enoxaparin (LOVENOX) injection 40 mg, Daily  magnesium sulfate 2 g in 50 mL IVPB premix, PRN  potassium chloride 10 mEq/100 mL IVPB (Peripheral Line), PRN  fentaNYL (SUBLIMAZE) 1,000 mcg in sodium chloride 0.9 % 100 mL infusion, Continuous  glucose (GLUTOSE) 40 % oral gel 15 g, PRN  dextrose 50 % IV solution, PRN  glucagon (rDNA) injection 1 mg, PRN  dextrose 5 % solution, PRN  insulin lispro (1 Unit Dial) 0-12 Units, Q4H  LORazepam (ATIVAN) injection 2 mg, Q2H PRN  propofol injection, Titrated  famotidine (PEPCID) injection 20 mg, BID  norepinephrine (LEVOPHED) 16 mg in dextrose 5% 250 mL infusion, Continuous        Objective:  /69   Pulse 71   Temp 98.7 °F (37.1 °C) (Temporal)   Resp 14   Ht 5' 7.5\" (1.715 m)   Wt 175 lb 14.8 oz (79.8 kg)   SpO2 94%   BMI 27.15 kg/m²     Intake/Output Summary (Last 24 hours) at 3/15/2020 1300  Last data filed at 3/15/2020 1000  Gross per 24 hour   Intake 1698.71 ml   Output 3775 ml   Net -2076.29 ml      Wt Readings from Last 3 Encounters:   03/15/20 175 lb 14.8 oz (79.8 kg) General appearance:  Sedated and calm  HEENT: atraumatic, Pupils equal, muscous membranes moist, no masses appreciated  Cardiovascular: Regular rate and rhythm no murmurs cap refill < 2sec   Respiratory:bibasilar crackles  Gastrointestinal: Abdomen soft, non-tender, BS+  EXT: trace edema  Neurology:sedated  Psychiatry: sedated  Skin: Warm, dry, no rashes appreciated    Labs and Tests:  CBC:   Recent Labs     03/13/20  2030 03/14/20  0355 03/15/20  0540   WBC 9.2 9.1 7.6   HGB 13.0* 12.5* 13.6    214 223     BMP:    Recent Labs     03/13/20 2030 03/14/20 0355 03/15/20  0540    141 142   K 4.3 4.0 3.8    107 101   CO2 25 25 28   BUN 14 12 13   CREATININE 0.8 0.8 0.8   GLUCOSE 155* 146* 294*     Hepatic: No results for input(s): AST, ALT, ALB, BILITOT, ALKPHOS in the last 72 hours. XR CHEST PORTABLE   Final Result   No significant interval change. XR CHEST PORTABLE   Final Result   1. Radiographic findings most suggestive of pulmonary edema/CHF. 2. Recommend advancing enteric tube by 5 cm. 3. Additional support devices as described above. Recent imaging reviewed    Problem List  Principal Problem:    NSTEMI (non-ST elevated myocardial infarction) ([facility] [address]  Active Problems:    Congestive heart failure of unknown etiology ([facility] [address]    3-vessel coronary artery disease    HTN (hypertension)    Uncontrolled type 2 diabetes mellitus with hyperglycemia (HCC)    Acute systolic (congestive) heart failure (HCC)    Acute respiratory failure with hypoxia (HCC)    Aspiration pneumonia ([facility] [address]  Resolved Problems:    * No resolved hospital problems.  *       Assessment & Plan:   Acute hypoxic respiratory failure secondary to new onset acute systolic heart failure   - continue vent management per pulm  - chest x-ray shows continued signs of pulmonary edema  - continue IV lasix chech bmp and mag in am and replace as needed  - zosyn dc'd, doubt aspiration pneumonia     NSTEMI:  3 vessel CAD,

## 2020-03-15 NOTE — PROGRESS NOTES
[facility] Pulmonary/CCM Progress note      Admit Date: 3/13/2020    Chief Complaint: Shortness of breath and chest pain    Subjective: Interval History: Still remains on propofol-on ventilator. Hemodynamically stable, though requiring some norepinephrine drip. Great urine output with Lasix. Not following commands off sedation as per RN. Scheduled Meds:   furosemide  40 mg Intravenous BID    sodium chloride flush  10 mL Intravenous 2 times per day    enoxaparin  40 mg Subcutaneous Daily    insulin lispro  0-12 Units Subcutaneous Q4H    famotidine (PEPCID) injection  20 mg Intravenous BID     Continuous Infusions:   fentaNYL (SUBLIMAZE) infusion 1 mcg/kg/hr (03/15/20 1048)    dextrose      propofol 40 mcg/kg/min (03/15/20 1049)    norepinephrine 2 mcg/min (03/14/20 1651)     PRN Meds:perflutren lipid microspheres, sodium chloride flush, acetaminophen **OR** acetaminophen, polyethylene glycol, promethazine **OR** ondansetron, magnesium sulfate, potassium chloride, glucose, dextrose, glucagon (rDNA), dextrose, LORazepam    Review of Systems  Unable to obtain since the patient is currently intubated and sedated    Objective:     Patient Vitals for the past 8 hrs:   BP Temp Temp src Pulse Resp SpO2 Weight   03/15/20 1058 -- -- -- -- 14 94 % --   03/15/20 0700 121/69 -- -- 71 14 96 % --   03/15/20 0600 124/68 -- -- 69 14 97 % 175 lb 14.8 oz (79.8 kg)   03/15/20 0500 (!) 94/53 98.7 °F (37.1 °C) Temporal 60 14 97 % --     I/O last 3 completed shifts:   In: 1668.7 [I.V.:579.7; NG/GT:1039; IV Piggyback:50]  Out: 4500 [Urine:4500]  I/O this shift:  In: 30 [I.V.:30]  Out: 1050 [Urine:1050]    General Appearance: Intubated and sedated, in no acute distress  Skin: warm and dry, no rash or erythema  Head: normocephalic and atraumatic  Eyes: pupils equal, round, and reactive to light, extraocular eye movements intact, conjunctivae normal  ENT: external ear and ear canal normal bilaterally, nose without deformity, nasal mucosa and turbinates normal  Neck: supple and non-tender without mass, no cervical lymphadenopathy  Pulmonary/Chest: clear to auscultation bilaterally- no wheezes, rales or rhonchi, normal air movement, no respiratory distress  Cardiovascular: normal rate, regular rhythm,  no murmurs, rubs, distal pulses intact, no carotid bruits  Abdomen: soft, non-tender, non-distended, normal bowel sounds, no masses or organomegaly  Lymph Nodes: Cervical, supraclavicular normal  Extremities: no cyanosis, clubbing or edema  Musculoskeletal: normal range of motion, no joint swelling, deformity or tenderness  Neurologic: Intubated, no focal neurologic deficits    Data Review:  CBC:   Lab Results   Component Value Date    WBC 7.6 03/15/2020    RBC 4.43 03/15/2020     BMP:   Lab Results   Component Value Date    GLUCOSE 294 03/15/2020    CO2 28 03/15/2020    BUN 13 03/15/2020    CREATININE 0.8 03/15/2020    CALCIUM 8.4 03/15/2020     ABG:   Lab Results   Component Value Date    MTE2KDP 25.6 03/13/2020    BEART -0.1 03/13/2020    F8HSASGP 96.9 03/13/2020    PHART 7.363 03/13/2020    DSC7FYT 45.1 03/13/2020    PO2ART 89.0 03/13/2020    UUR4JRZ 60.5 03/13/2020       Radiology: All pertinent images / reports were reviewed as a part of this visit.     Narrative   EXAMINATION:   ONE XRAY VIEW OF THE CHEST       3/13/2020 7:44 pm       COMPARISON:   None.       HISTORY:   ORDERING SYSTEM PROVIDED HISTORY: SOB , intubated   TECHNOLOGIST PROVIDED HISTORY:   Reason for exam:->SOB , intubated   Reason for Exam: SOB , intubated   Acuity: Acute   Type of Exam: Subsequent/Follow-up       FINDINGS:   SUPPORT DEVICES:       Endotracheal tube in place with tip terminating approximately 2.5 cm above   the deric.       Enteric tube noted with side holes terminating at the GE junction, recommend   advancing by 5 cm.       Right upper extremity PICC noted with tip terminating at the cavoatrial   junction.       Multiple leads overlying the right lower chest somewhat limit evaluation.       The cardiopericardial silhouette is mildly enlarged.  There is perihilar   fullness with bibasilar opacities likely reflecting pleural effusions,   moderate on the right and small on the left.  Overall radiographic findings   are most suggestive of pulmonary edema/CHF.       There is no pneumothorax.           Impression   1. Radiographic findings most suggestive of pulmonary edema/CHF. 2. Recommend advancing enteric tube by 5 cm. 3. Additional support devices as described above. Problem List:   NSTEMI/triple-vessel disease  Pulmonary edema  Acute hypoxic respiratory failure  Assessment/Plan:     Acute hypoxic respiratory failure related to pulmonary edema as noted on chest x-ray. Continue with IV diuresis, Lasix 40 mg IV twice daily. Good urine output noted. Chest x-ray shows continued signs of pulmonary edema. Hold off on sedation and try to extubate if performs well on spontaneous breathing trial.  Also monitor neurologic status-should follow commands. Acute inferior MI/triple-vessel disease, with pulmonary edema. Cardiac cath performed at [facility]. ? Need for revascularization of RCA and LAD vs CABG. EF of 25 to 30%. Plans for intervention after extubation per cardiology. Doubt aspiration pneumonia, stop antibiotics. Lovenox and Pepcid for prophylaxis. Critical care team will follow.     Leticia Lee MD     Critical care time of 35 minutes excluding procedures

## 2020-03-16 ENCOUNTER — APPOINTMENT (OUTPATIENT)
Dept: MRI IMAGING | Age: 65
DRG: 233 | End: 2020-03-16
Attending: INTERNAL MEDICINE
Payer: COMMERCIAL

## 2020-03-16 ENCOUNTER — APPOINTMENT (OUTPATIENT)
Dept: GENERAL RADIOLOGY | Age: 65
DRG: 233 | End: 2020-03-16
Attending: INTERNAL MEDICINE
Payer: COMMERCIAL

## 2020-03-16 LAB
ANION GAP SERPL CALCULATED.3IONS-SCNC: 10 MMOL/L (ref 3–16)
BASOPHILS ABSOLUTE: 0 K/UL (ref 0–0.2)
BASOPHILS RELATIVE PERCENT: 0.6 %
BUN BLDV-MCNC: 15 MG/DL (ref 7–20)
CALCIUM SERPL-MCNC: 8.6 MG/DL (ref 8.3–10.6)
CHLORIDE BLD-SCNC: 98 MMOL/L (ref 99–110)
CO2: 33 MMOL/L (ref 21–32)
CREAT SERPL-MCNC: 0.8 MG/DL (ref 0.8–1.3)
CULTURE, RESPIRATORY: NORMAL
EOSINOPHILS ABSOLUTE: 0.5 K/UL (ref 0–0.6)
EOSINOPHILS RELATIVE PERCENT: 6.8 %
GFR AFRICAN AMERICAN: >60
GFR NON-AFRICAN AMERICAN: >60
GLUCOSE BLD-MCNC: 245 MG/DL (ref 70–99)
GLUCOSE BLD-MCNC: 326 MG/DL (ref 70–99)
GLUCOSE BLD-MCNC: 352 MG/DL (ref 70–99)
GLUCOSE BLD-MCNC: 353 MG/DL (ref 70–99)
GLUCOSE BLD-MCNC: 376 MG/DL (ref 70–99)
GLUCOSE BLD-MCNC: 385 MG/DL (ref 70–99)
GLUCOSE BLD-MCNC: 401 MG/DL (ref 70–99)
GRAM STAIN RESULT: NORMAL
HCT VFR BLD CALC: 41.4 % (ref 40.5–52.5)
HEMOGLOBIN: 14 G/DL (ref 13.5–17.5)
LYMPHOCYTES ABSOLUTE: 1.2 K/UL (ref 1–5.1)
LYMPHOCYTES RELATIVE PERCENT: 14.9 %
MCH RBC QN AUTO: 30.9 PG (ref 26–34)
MCHC RBC AUTO-ENTMCNC: 33.9 G/DL (ref 31–36)
MCV RBC AUTO: 91 FL (ref 80–100)
MONOCYTES ABSOLUTE: 1 K/UL (ref 0–1.3)
MONOCYTES RELATIVE PERCENT: 12.1 %
NEUTROPHILS ABSOLUTE: 5.2 K/UL (ref 1.7–7.7)
NEUTROPHILS RELATIVE PERCENT: 65.6 %
PDW BLD-RTO: 12.9 % (ref 12.4–15.4)
PERFORMED ON: ABNORMAL
PLATELET # BLD: 260 K/UL (ref 135–450)
PMV BLD AUTO: 9.5 FL (ref 5–10.5)
POTASSIUM REFLEX MAGNESIUM: 4 MMOL/L (ref 3.5–5.1)
RBC # BLD: 4.55 M/UL (ref 4.2–5.9)
SODIUM BLD-SCNC: 141 MMOL/L (ref 136–145)
WBC # BLD: 7.9 K/UL (ref 4–11)

## 2020-03-16 PROCEDURE — 80048 BASIC METABOLIC PNL TOTAL CA: CPT

## 2020-03-16 PROCEDURE — 2580000003 HC RX 258: Performed by: INTERNAL MEDICINE

## 2020-03-16 PROCEDURE — 94003 VENT MGMT INPAT SUBQ DAY: CPT | Performed by: INTERNAL MEDICINE

## 2020-03-16 PROCEDURE — 6360000002 HC RX W HCPCS: Performed by: INTERNAL MEDICINE

## 2020-03-16 PROCEDURE — 2700000000 HC OXYGEN THERAPY PER DAY

## 2020-03-16 PROCEDURE — 99291 CRITICAL CARE FIRST HOUR: CPT | Performed by: INTERNAL MEDICINE

## 2020-03-16 PROCEDURE — 2500000003 HC RX 250 WO HCPCS: Performed by: INTERNAL MEDICINE

## 2020-03-16 PROCEDURE — 85025 COMPLETE CBC W/AUTO DIFF WBC: CPT

## 2020-03-16 PROCEDURE — 6370000000 HC RX 637 (ALT 250 FOR IP): Performed by: INTERNAL MEDICINE

## 2020-03-16 PROCEDURE — 94770 HC ETCO2 MONITOR DAILY: CPT

## 2020-03-16 PROCEDURE — 94761 N-INVAS EAR/PLS OXIMETRY MLT: CPT

## 2020-03-16 PROCEDURE — 70551 MRI BRAIN STEM W/O DYE: CPT

## 2020-03-16 PROCEDURE — 2000000000 HC ICU R&B

## 2020-03-16 PROCEDURE — 94003 VENT MGMT INPAT SUBQ DAY: CPT

## 2020-03-16 RX ORDER — TIZANIDINE 4 MG/1
4 TABLET ORAL EVERY 8 HOURS PRN
COMMUNITY
End: 2020-10-02

## 2020-03-16 RX ORDER — GLIPIZIDE 10 MG/1
10 TABLET ORAL
Status: ON HOLD | COMMUNITY
End: 2020-03-29 | Stop reason: HOSPADM

## 2020-03-16 RX ORDER — CHLORHEXIDINE GLUCONATE 0.12 MG/ML
15 RINSE ORAL 2 TIMES DAILY
Status: DISCONTINUED | OUTPATIENT
Start: 2020-03-16 | End: 2020-03-18

## 2020-03-16 RX ORDER — MESALAMINE 0.38 G/1
1.5 CAPSULE, EXTENDED RELEASE ORAL DAILY
COMMUNITY
End: 2020-04-17

## 2020-03-16 RX ORDER — INSULIN LISPRO 100 [IU]/ML
0-18 INJECTION, SOLUTION INTRAVENOUS; SUBCUTANEOUS EVERY 4 HOURS
Status: DISCONTINUED | OUTPATIENT
Start: 2020-03-16 | End: 2020-03-24

## 2020-03-16 RX ORDER — GLYCOPYRROLATE 0.2 MG/ML
0.2 INJECTION INTRAMUSCULAR; INTRAVENOUS ONCE
Status: COMPLETED | OUTPATIENT
Start: 2020-03-16 | End: 2020-03-16

## 2020-03-16 RX ORDER — MECLIZINE HYDROCHLORIDE 25 MG/1
25 TABLET ORAL 3 TIMES DAILY PRN
COMMUNITY
End: 2020-10-02

## 2020-03-16 RX ORDER — LOPERAMIDE HYDROCHLORIDE 2 MG/1
2 CAPSULE ORAL DAILY
COMMUNITY
End: 2020-05-18

## 2020-03-16 RX ORDER — LOSARTAN POTASSIUM 25 MG/1
25 TABLET ORAL DAILY
COMMUNITY
End: 2020-05-28 | Stop reason: DRUGHIGH

## 2020-03-16 RX ADMIN — FUROSEMIDE 40 MG: 10 INJECTION, SOLUTION INTRAMUSCULAR; INTRAVENOUS at 18:26

## 2020-03-16 RX ADMIN — INSULIN LISPRO 15 UNITS: 100 INJECTION, SOLUTION INTRAVENOUS; SUBCUTANEOUS at 21:15

## 2020-03-16 RX ADMIN — ENOXAPARIN SODIUM 40 MG: 40 INJECTION SUBCUTANEOUS at 07:55

## 2020-03-16 RX ADMIN — INSULIN LISPRO 12 UNITS: 100 INJECTION, SOLUTION INTRAVENOUS; SUBCUTANEOUS at 05:40

## 2020-03-16 RX ADMIN — INSULIN GLARGINE 20 UNITS: 100 INJECTION, SOLUTION SUBCUTANEOUS at 13:44

## 2020-03-16 RX ADMIN — FUROSEMIDE 40 MG: 10 INJECTION, SOLUTION INTRAMUSCULAR; INTRAVENOUS at 07:54

## 2020-03-16 RX ADMIN — PROPOFOL 50 MCG/KG/MIN: 10 INJECTION, EMULSION INTRAVENOUS at 23:15

## 2020-03-16 RX ADMIN — Medication 10 ML: at 21:13

## 2020-03-16 RX ADMIN — FAMOTIDINE 20 MG: 10 INJECTION INTRAVENOUS at 21:12

## 2020-03-16 RX ADMIN — PROPOFOL 55 MCG/KG/MIN: 10 INJECTION, EMULSION INTRAVENOUS at 17:02

## 2020-03-16 RX ADMIN — INSULIN LISPRO 10 UNITS: 100 INJECTION, SOLUTION INTRAVENOUS; SUBCUTANEOUS at 09:18

## 2020-03-16 RX ADMIN — Medication 10 ML: at 07:54

## 2020-03-16 RX ADMIN — FENTANYL CITRATE 0.5 MCG/KG/HR: 50 INJECTION, SOLUTION INTRAMUSCULAR; INTRAVENOUS at 17:02

## 2020-03-16 RX ADMIN — PROPOFOL 50 MCG/KG/MIN: 10 INJECTION, EMULSION INTRAVENOUS at 04:18

## 2020-03-16 RX ADMIN — PROPOFOL 55 MCG/KG/MIN: 10 INJECTION, EMULSION INTRAVENOUS at 20:27

## 2020-03-16 RX ADMIN — FAMOTIDINE 20 MG: 10 INJECTION INTRAVENOUS at 07:54

## 2020-03-16 RX ADMIN — CHLORHEXIDINE GLUCONATE 15 ML: 1.2 RINSE ORAL at 21:37

## 2020-03-16 RX ADMIN — INSULIN LISPRO 4 UNITS: 100 INJECTION, SOLUTION INTRAVENOUS; SUBCUTANEOUS at 00:35

## 2020-03-16 RX ADMIN — PROPOFOL 50 MCG/KG/MIN: 10 INJECTION, EMULSION INTRAVENOUS at 09:13

## 2020-03-16 RX ADMIN — GLYCOPYRROLATE 0.2 MG: 0.2 INJECTION INTRAMUSCULAR; INTRAVENOUS at 18:27

## 2020-03-16 RX ADMIN — INSULIN HUMAN 25 UNITS: 100 INJECTION, SUSPENSION SUBCUTANEOUS at 21:19

## 2020-03-16 RX ADMIN — INSULIN LISPRO 10 UNITS: 100 INJECTION, SOLUTION INTRAVENOUS; SUBCUTANEOUS at 13:46

## 2020-03-16 ASSESSMENT — PAIN SCALES - GENERAL
PAINLEVEL_OUTOF10: 0

## 2020-03-16 ASSESSMENT — PULMONARY FUNCTION TESTS
PIF_VALUE: 24
PIF_VALUE: 20
PIF_VALUE: 22
PIF_VALUE: 25
PIF_VALUE: 26

## 2020-03-16 NOTE — PROGRESS NOTES
03/16/20 0755   Vent Patient Data   Plateau Pressure 15 [id number]   Static Compliance 47.4 mL/cmH2O   Dynamic Compliance 27.88 mL/cmH2O

## 2020-03-16 NOTE — PROGRESS NOTES
03/15/20 2332   Vent Patient Data   Plateau Pressure 16 PIX02   Static Compliance 41 mL/cmH2O   Dynamic Compliance 29 mL/cmH2O

## 2020-03-16 NOTE — PROGRESS NOTES
[facility]    3/16/2020 11:30 AM        Name: Shauna Islas . Admitted: 3/13/2020  Primary Care Provider: No primary care provider on file.  (Tel: None)      Subjective:    Patient intubated and sedated no acute events overnight  Reviewed interval ancillary notes    Current Medications  insulin glargine (LANTUS;BASAGLAR) injection pen 20 Units, Daily  nitroGLYCERIN 50 mg in dextrose 5% 250 mL infusion, Continuous  dexmedetomidine (PRECEDEX) 400 mcg in sodium chloride 0.9 % 100 mL infusion, Continuous  perflutren lipid microspheres (DEFINITY) injection 1.65 mg, ONCE PRN  furosemide (LASIX) injection 40 mg, BID  sodium chloride flush 0.9 % injection 10 mL, 2 times per day  sodium chloride flush 0.9 % injection 10 mL, PRN  acetaminophen (TYLENOL) tablet 650 mg, Q6H PRN    Or  acetaminophen (TYLENOL) suppository 650 mg, Q6H PRN  polyethylene glycol (GLYCOLAX) packet 17 g, Daily PRN  promethazine (PHENERGAN) tablet 12.5 mg, Q6H PRN    Or  ondansetron (ZOFRAN) injection 4 mg, Q6H PRN  enoxaparin (LOVENOX) injection 40 mg, Daily  magnesium sulfate 2 g in 50 mL IVPB premix, PRN  potassium chloride 10 mEq/100 mL IVPB (Peripheral Line), PRN  fentaNYL (SUBLIMAZE) 1,000 mcg in sodium chloride 0.9 % 100 mL infusion, Continuous  glucose (GLUTOSE) 40 % oral gel 15 g, PRN  dextrose 50 % IV solution, PRN  glucagon (rDNA) injection 1 mg, PRN  dextrose 5 % solution, PRN  insulin lispro (1 Unit Dial) 0-12 Units, Q4H  LORazepam (ATIVAN) injection 2 mg, Q2H PRN  propofol injection, Titrated  famotidine (PEPCID) injection 20 mg, BID  norepinephrine (LEVOPHED) 16 mg in dextrose 5% 250 mL infusion, Continuous        Objective:  /64   Pulse 80   Temp 97.7 °F (36.5 °C) (Temporal)   Resp 15   Ht 5' 7.5\" (1.715 m)   Wt 174 lb 6.1 oz (79.1 kg)   SpO2 96%   BMI 26.91 kg/m²     Intake/Output Summary (Last 24 hours) at

## 2020-03-16 NOTE — PROGRESS NOTES
Nutrition Assessment (Enteral Nutrition)    Type and Reason for Visit: Reassess    Nutrition Recommendations:   1. RD recommends new TF recs d/t high amount of diprivan being administered at this time- Osmolite 1.2 @ 55 ml/hr. Provides: 1584 livier and 73 gms protein. 1320 ml total vol. Free water bolus 125 ml every 6 hours or per MD.   2. 1 Bottle of Uyojjhyuo3mg a day  3. RD will continue to monitor diprivan rate and adjust TF as necessary    Nutrition Assessment: Pt is nutritionally stable at this time. Pt is at goal rate for TF- osmolite 1.2 @ 75 ml/hr. However pt is also on diprivan at 25ml/hr which provides: 660 cals. At this time pt is being overfed. RD to adjust TF to best meet pt's needs. Malnutrition Assessment:  · Malnutrition Status: No malnutrition  · Context: Acute illness or injury  · Findings of the 6 clinical characteristics of malnutrition (Minimum of 2 out of 6 clinical characteristics is required to make the diagnosis of moderate or severe Protein Calorie Malnutrition based on AND/ASPEN Guidelines):  1. Energy Intake-Less than or equal to 50% of estimated energy requirement, (at least 3 days)    2. Weight Loss-Unable to assess,    3. Fat Loss-No significant subcutaneous fat loss,    4. Muscle Loss-No significant muscle mass loss,    5. Fluid Accumulation-No significant fluid accumulation,    6.  Strength-Not measured    Nutrition Risk Level: High    Nutrition Needs:  · Estimated Daily Total Kcal: 1580-975  · Estimated Daily Protein (g):  gms(1.2-1.5 gms)  · Estimated Daily Fluid (ml/day): 1 ml/kcal    Nutrition Diagnosis:   · Problem: (Excessive energy intake)  · Etiology: related to Nutrition support - EN(and diprivan rate)     Signs and symptoms:  as evidenced by (Pt's energy needs being exceeded )    Objective Information:  · Nutrition-Focused Physical Findings: No edema noted.    · Wound Type: None  · Current Nutrition Therapies:  · Oral Diet Orders: NPO   · Oral Diet intake: NPO  · Oral Nutrition Supplement (ONS) Orders: None  · ONS intake: NPO  · Tube Feeding (TF) Orders:   · Feeding Route: Orogastric  · Formula: Standard without Fiber(Osmolite 1.2)  · Rate (ml/hr):75 ml/hr    · Volume (ml/day): 1800 ml  · Duration: Continuous  · Water Flushes: 115 ml q 4 hours  · Current TF & Flush Orders Provides: Osmolite 1.2 @ 75 ml/hr. Provides: 1800 ml and 2160 livier and 100 gms protein. 147 ml free water. Additional 115 ml water bolus q 4 hours to meet daily fluid needs  · Goal TF & Flush Orders Provides: Osmolite 1.2 @ 55 ml/hr. Provides: 1584 livier and 73 gms protein. 1320 ml total vol.  Free water bolus 125 ml every 6 hours or per MD.   · Anthropometric Measures:  · Ht: 5' 7.5\" (171.5 cm)   · Current Body Wt: 174 lb (78.9 kg)  · Ideal Body Wt: 151 lb (68.5 kg), % Ideal Body    · BMI Classification: BMI 25.0 - 29.9 Overweight    Nutrition Interventions:   Continue NPO, Modify current Tube Feeding  Continued Inpatient Monitoring, Education Not Indicated    Nutrition Evaluation:   · Evaluation: Goal achieved   · Goals: EN tolerance @ goal rate   · Monitoring: TF Intake, TF Tolerance, Weight, Pertinent Labs, I&O      Electronically signed by Idania Wright RD, CNSC, LD on 3/16/20 at 10:25 AM EDT    Contact Number: [phone]

## 2020-03-16 NOTE — PROGRESS NOTES
This RN discussed pt appearing uncomfortable with Critical care MD, pt breathing over vent settings, Fentanyl restarted per Dr. Ute De Luna.

## 2020-03-16 NOTE — PROGRESS NOTES
Pt sedation was decreased for sedation vacation, pt was moving all extremities, but not to commands. Pt appeared to be reaching for ETT. Sedation restarted.   VSS

## 2020-03-16 NOTE — PROGRESS NOTES
Pulmonary & Critical Care Medicine ICU Progress Note      ASSESSMENT AND PLAN:     Acute Hypoxic Respiratory Failure   Mechanical Ventilation with Assist Control    Propofol infusion for sedation using RASS    Fentanyl infusion for analgesia using CPOT  Hypotension   Support blood pressure with Levophed  Acute Encephalopathy   No acute abnormalities on Brain MRI  Bronchorrhea   Robinul 0.2 mg IV today  Diabetes Mellitus with Hyperglycemia   NPH 25 units every 8 hours - hold if glucose less than 150   Sliding Scale Insulin   Recent Acute NSTEMI   Management per Cardiology  Acute Systolic Heart Failure   Management per Cardiology          PROPHYLAXIS:   DVT Prophylaxis with Lovenox  GI Prophylaxis with Pepcid  Pneumonia Prophylaxis with Chlorhexidene    NUTRITION:   Tube feedings    DISPOSITION:   CVICU Patient        REASON FOR VISIT:  acute respiratory failure       UPDATE:   Intubated and on mechanical ventilation. He is on Levophed for hypotension. Glucose is elevated. He has excessive secretions. CHIEF COMPLAINT:  Unable to obtain due to intubation and sedation. HISTORY:  Unable to obtain due to intubation and sedation. REVIEW OF SYMPTOMS:  Unable to obtain due to intubation and sedation.       MECHANICAL VENTILATION:  Intubated  Vent Mode: AC/VC Rate Set: 14 bmp/Vt Ordered: 450 mL/ /FiO2 : 40 %    ABG:   Recent Labs     03/13/20  2140   PHART 7.363   MSZ3LFM 45.1*   PO2ART 89.0         IV:   fentaNYL (SUBLIMAZE) infusion Stopped (03/15/20 1445)    dextrose      propofol 50 mcg/kg/min (03/16/20 0913)    norepinephrine 2 mcg/min (03/16/20 1518)       Intake/Output Summary (Last 24 hours) at 3/16/2020 1644  Last data filed at 3/16/2020 1038  Gross per 24 hour   Intake 2180.5 ml   Output 3875 ml   Net -1694.5 ml       MEDICATIONS:  Scheduled Meds:   insulin NPH  25 Units Subcutaneous Q8H    insulin lispro  0-18 Units Subcutaneous Q4H    glycopyrrolate  0.2 mg Intravenous Once    exam:->assess for infiltrates    FINDINGS:  Support hardware remains in satisfactory position. The heart is enlarged  with moderate central pulmonary venous congestion. Bilateral perihilar and  lower lobe airspace opacification persists along with small bilateral pleural  effusions. Impression No significant interval change. BRAIN MRI SCAN 3/16/20:  1. Extensive patient motion limits evaluation. 2. No convincing acute intracranial abnormality or acute infarct within the   limitations of patient motion. 3. Mild global parenchymal volume loss with chronic microvascular ischemic   changes. 4. Scattered mucosal thickening of the paranasal sinuses with bilateral   mastoid effusions.

## 2020-03-16 NOTE — PLAN OF CARE
Problem: HEMODYNAMIC STATUS  Goal: Patient has stable vital signs and fluid balance  3/16/2020 0057 by Doyle Reinoso RN  Outcome: Ongoing  Note: Will continue to monitor and titrate Levo as needed.

## 2020-03-17 ENCOUNTER — APPOINTMENT (OUTPATIENT)
Dept: GENERAL RADIOLOGY | Age: 65
DRG: 233 | End: 2020-03-17
Attending: INTERNAL MEDICINE
Payer: COMMERCIAL

## 2020-03-17 LAB
ALBUMIN SERPL-MCNC: 3.9 G/DL (ref 3.4–5)
ALP BLD-CCNC: 76 U/L (ref 40–129)
ALT SERPL-CCNC: 53 U/L (ref 10–40)
ANION GAP SERPL CALCULATED.3IONS-SCNC: 13 MMOL/L (ref 3–16)
AST SERPL-CCNC: 47 U/L (ref 15–37)
BASE EXCESS ARTERIAL: 11.1 MMOL/L (ref -3–3)
BASOPHILS ABSOLUTE: 0.1 K/UL (ref 0–0.2)
BASOPHILS RELATIVE PERCENT: 1.5 %
BILIRUB SERPL-MCNC: <0.2 MG/DL (ref 0–1)
BILIRUBIN DIRECT: <0.2 MG/DL (ref 0–0.3)
BILIRUBIN, INDIRECT: ABNORMAL MG/DL (ref 0–1)
BLOOD CULTURE, ROUTINE: NORMAL
BUN BLDV-MCNC: 21 MG/DL (ref 7–20)
CALCIUM SERPL-MCNC: 9 MG/DL (ref 8.3–10.6)
CARBOXYHEMOGLOBIN ARTERIAL: 0.7 % (ref 0–1.5)
CHLORIDE BLD-SCNC: 100 MMOL/L (ref 99–110)
CO2: 35 MMOL/L (ref 21–32)
CREAT SERPL-MCNC: 1 MG/DL (ref 0.8–1.3)
CULTURE, BLOOD 2: NORMAL
EOSINOPHILS ABSOLUTE: 0.6 K/UL (ref 0–0.6)
EOSINOPHILS RELATIVE PERCENT: 7.2 %
GFR AFRICAN AMERICAN: >60
GFR NON-AFRICAN AMERICAN: >60
GLUCOSE BLD-MCNC: 153 MG/DL (ref 70–99)
GLUCOSE BLD-MCNC: 173 MG/DL (ref 70–99)
GLUCOSE BLD-MCNC: 184 MG/DL (ref 70–99)
GLUCOSE BLD-MCNC: 211 MG/DL (ref 70–99)
GLUCOSE BLD-MCNC: 225 MG/DL (ref 70–99)
GLUCOSE BLD-MCNC: 228 MG/DL (ref 70–99)
GLUCOSE BLD-MCNC: 245 MG/DL (ref 70–99)
GLUCOSE BLD-MCNC: 275 MG/DL (ref 70–99)
GLUCOSE BLD-MCNC: 287 MG/DL (ref 70–99)
HCO3 ARTERIAL: 37.3 MMOL/L (ref 21–29)
HCT VFR BLD CALC: 46 % (ref 40.5–52.5)
HEMOGLOBIN, ART, EXTENDED: 15.2 G/DL (ref 13.5–17.5)
HEMOGLOBIN: 15.3 G/DL (ref 13.5–17.5)
LYMPHOCYTES ABSOLUTE: 1.4 K/UL (ref 1–5.1)
LYMPHOCYTES RELATIVE PERCENT: 16 %
MAGNESIUM: 2.4 MG/DL (ref 1.8–2.4)
MCH RBC QN AUTO: 30 PG (ref 26–34)
MCHC RBC AUTO-ENTMCNC: 33.2 G/DL (ref 31–36)
MCV RBC AUTO: 90.4 FL (ref 80–100)
METHEMOGLOBIN ARTERIAL: 0.4 %
MONOCYTES ABSOLUTE: 1.2 K/UL (ref 0–1.3)
MONOCYTES RELATIVE PERCENT: 13.6 %
NEUTROPHILS ABSOLUTE: 5.3 K/UL (ref 1.7–7.7)
NEUTROPHILS RELATIVE PERCENT: 61.7 %
O2 CONTENT ARTERIAL: 20 ML/DL
O2 SAT, ARTERIAL: 96.1 %
O2 THERAPY: ABNORMAL
PCO2 ARTERIAL: 53.2 MMHG (ref 35–45)
PDW BLD-RTO: 13.1 % (ref 12.4–15.4)
PERFORMED ON: ABNORMAL
PH ARTERIAL: 7.45 (ref 7.35–7.45)
PLATELET # BLD: 280 K/UL (ref 135–450)
PMV BLD AUTO: 8.9 FL (ref 5–10.5)
PO2 ARTERIAL: 78.8 MMHG (ref 75–108)
POTASSIUM REFLEX MAGNESIUM: 3.5 MMOL/L (ref 3.5–5.1)
RBC # BLD: 5.09 M/UL (ref 4.2–5.9)
SODIUM BLD-SCNC: 148 MMOL/L (ref 136–145)
TCO2 ARTERIAL: 87.2 MMOL/L
TOTAL PROTEIN: 7 G/DL (ref 6.4–8.2)
WBC # BLD: 8.7 K/UL (ref 4–11)

## 2020-03-17 PROCEDURE — 80076 HEPATIC FUNCTION PANEL: CPT

## 2020-03-17 PROCEDURE — 6360000002 HC RX W HCPCS: Performed by: INTERNAL MEDICINE

## 2020-03-17 PROCEDURE — 71045 X-RAY EXAM CHEST 1 VIEW: CPT

## 2020-03-17 PROCEDURE — 94770 HC ETCO2 MONITOR DAILY: CPT

## 2020-03-17 PROCEDURE — 6370000000 HC RX 637 (ALT 250 FOR IP): Performed by: INTERNAL MEDICINE

## 2020-03-17 PROCEDURE — 84478 ASSAY OF TRIGLYCERIDES: CPT

## 2020-03-17 PROCEDURE — 94761 N-INVAS EAR/PLS OXIMETRY MLT: CPT

## 2020-03-17 PROCEDURE — 99291 CRITICAL CARE FIRST HOUR: CPT | Performed by: INTERNAL MEDICINE

## 2020-03-17 PROCEDURE — 2580000003 HC RX 258: Performed by: INTERNAL MEDICINE

## 2020-03-17 PROCEDURE — 94750 HC PULMONARY COMPLIANCE STUDY: CPT

## 2020-03-17 PROCEDURE — 83735 ASSAY OF MAGNESIUM: CPT

## 2020-03-17 PROCEDURE — 80048 BASIC METABOLIC PNL TOTAL CA: CPT

## 2020-03-17 PROCEDURE — 2000000000 HC ICU R&B

## 2020-03-17 PROCEDURE — 82803 BLOOD GASES ANY COMBINATION: CPT

## 2020-03-17 PROCEDURE — 85025 COMPLETE CBC W/AUTO DIFF WBC: CPT

## 2020-03-17 PROCEDURE — 94003 VENT MGMT INPAT SUBQ DAY: CPT | Performed by: INTERNAL MEDICINE

## 2020-03-17 PROCEDURE — 2700000000 HC OXYGEN THERAPY PER DAY

## 2020-03-17 PROCEDURE — 99255 IP/OBS CONSLTJ NEW/EST HI 80: CPT | Performed by: PSYCHIATRY & NEUROLOGY

## 2020-03-17 PROCEDURE — 2500000003 HC RX 250 WO HCPCS: Performed by: INTERNAL MEDICINE

## 2020-03-17 PROCEDURE — 94003 VENT MGMT INPAT SUBQ DAY: CPT

## 2020-03-17 RX ORDER — POTASSIUM BICARBONATE 25 MEQ/1
50 TABLET, EFFERVESCENT ORAL ONCE
Status: COMPLETED | OUTPATIENT
Start: 2020-03-17 | End: 2020-03-17

## 2020-03-17 RX ORDER — ACETAZOLAMIDE 250 MG/1
500 TABLET ORAL EVERY 12 HOURS
Status: COMPLETED | OUTPATIENT
Start: 2020-03-17 | End: 2020-03-18

## 2020-03-17 RX ADMIN — INSULIN LISPRO 6 UNITS: 100 INJECTION, SOLUTION INTRAVENOUS; SUBCUTANEOUS at 10:39

## 2020-03-17 RX ADMIN — INSULIN HUMAN 25 UNITS: 100 INJECTION, SUSPENSION SUBCUTANEOUS at 03:47

## 2020-03-17 RX ADMIN — INSULIN HUMAN 30 UNITS: 100 INJECTION, SUSPENSION SUBCUTANEOUS at 20:41

## 2020-03-17 RX ADMIN — PROPOFOL 45 MCG/KG/MIN: 10 INJECTION, EMULSION INTRAVENOUS at 06:17

## 2020-03-17 RX ADMIN — FAMOTIDINE 20 MG: 10 INJECTION INTRAVENOUS at 20:06

## 2020-03-17 RX ADMIN — FAMOTIDINE 20 MG: 10 INJECTION INTRAVENOUS at 10:44

## 2020-03-17 RX ADMIN — FENTANYL CITRATE 0.5 MCG/KG/HR: 50 INJECTION, SOLUTION INTRAMUSCULAR; INTRAVENOUS at 02:36

## 2020-03-17 RX ADMIN — Medication 10 ML: at 20:06

## 2020-03-17 RX ADMIN — INSULIN LISPRO 6 UNITS: 100 INJECTION, SOLUTION INTRAVENOUS; SUBCUTANEOUS at 03:47

## 2020-03-17 RX ADMIN — PROPOFOL 45 MCG/KG/MIN: 10 INJECTION, EMULSION INTRAVENOUS at 12:36

## 2020-03-17 RX ADMIN — ACETAZOLAMIDE 500 MG: 250 TABLET ORAL at 14:52

## 2020-03-17 RX ADMIN — Medication 10 ML: at 10:44

## 2020-03-17 RX ADMIN — CHLORHEXIDINE GLUCONATE 15 ML: 1.2 RINSE ORAL at 20:42

## 2020-03-17 RX ADMIN — INSULIN LISPRO 9 UNITS: 100 INJECTION, SOLUTION INTRAVENOUS; SUBCUTANEOUS at 00:17

## 2020-03-17 RX ADMIN — INSULIN LISPRO 3 UNITS: 100 INJECTION, SOLUTION INTRAVENOUS; SUBCUTANEOUS at 12:39

## 2020-03-17 RX ADMIN — INSULIN LISPRO 3 UNITS: 100 INJECTION, SOLUTION INTRAVENOUS; SUBCUTANEOUS at 20:40

## 2020-03-17 RX ADMIN — ENOXAPARIN SODIUM 40 MG: 40 INJECTION SUBCUTANEOUS at 10:45

## 2020-03-17 RX ADMIN — PROPOFOL 45 MCG/KG/MIN: 10 INJECTION, EMULSION INTRAVENOUS at 02:36

## 2020-03-17 RX ADMIN — INSULIN LISPRO 3 UNITS: 100 INJECTION, SOLUTION INTRAVENOUS; SUBCUTANEOUS at 17:34

## 2020-03-17 RX ADMIN — POTASSIUM BICARBONATE 50 MEQ: 978 TABLET, EFFERVESCENT ORAL at 14:52

## 2020-03-17 RX ADMIN — CHLORHEXIDINE GLUCONATE 15 ML: 1.2 RINSE ORAL at 10:44

## 2020-03-17 RX ADMIN — PROPOFOL 50 MCG/KG/MIN: 10 INJECTION, EMULSION INTRAVENOUS at 17:27

## 2020-03-17 RX ADMIN — INSULIN HUMAN 30 UNITS: 100 INJECTION, SUSPENSION SUBCUTANEOUS at 13:49

## 2020-03-17 RX ADMIN — PROPOFOL 50 MCG/KG/MIN: 10 INJECTION, EMULSION INTRAVENOUS at 20:06

## 2020-03-17 ASSESSMENT — PULMONARY FUNCTION TESTS
PIF_VALUE: 22
PIF_VALUE: 27
PIF_VALUE: 34
PIF_VALUE: 28
PIF_VALUE: 25
PIF_VALUE: 27
PIF_VALUE: 30
PIF_VALUE: 26
PIF_VALUE: 20

## 2020-03-17 ASSESSMENT — PAIN SCALES - GENERAL
PAINLEVEL_OUTOF10: 0

## 2020-03-17 NOTE — PROGRESS NOTES
03/17/20  0335   PHART 7.454*   PPS4EHA 53.2*   PO2ART 78.8         IV:   fentaNYL (SUBLIMAZE) infusion 0.5 mcg/kg/hr (03/17/20 0236)    dextrose      propofol 45 mcg/kg/min (03/17/20 0617)    norepinephrine Stopped (03/17/20 0332)       Intake/Output Summary (Last 24 hours) at 3/17/2020 1134  Last data filed at 3/17/2020 0630  Gross per 24 hour   Intake 2510 ml   Output 1460 ml   Net 1050 ml       MEDICATIONS:  Scheduled Meds:   insulin NPH  25 Units Subcutaneous Q8H    insulin lispro  0-18 Units Subcutaneous Q4H    chlorhexidine  15 mL Mouth/Throat BID    sodium chloride flush  10 mL Intravenous 2 times per day    enoxaparin  40 mg Subcutaneous Daily    famotidine (PEPCID) injection  20 mg Intravenous BID     PRN Meds:  perflutren lipid microspheres, sodium chloride flush, acetaminophen **OR** acetaminophen, polyethylene glycol, promethazine **OR** ondansetron, magnesium sulfate, potassium chloride, dextrose, dextrose, LORazepam      PHYSICAL EXAM:  Vital Signs: BP 98/60   Pulse 67   Temp 97.6 °F (36.4 °C) (Temporal)   Resp 14   Ht 5' 7.5\" (1.715 m)   Wt 167 lb 1.7 oz (75.8 kg)   SpO2 95%   BMI 25.79 kg/m²      Gen: Intubated and on mechanical ventilation. ENT:   Endotracheal tube is in place. No lip lesions. Resp:   No accessory muscle use. Clear lungs. CV:   PMI is normal. No murmur or gallop. GI:   Soft and not distended. No tenderness. Skin:  Warm and dry. No rash. Neuro:  Sedated. No tremor.       LAB RESULTS:  CBC:   Recent Labs     03/15/20  0540 03/16/20  0410 03/17/20 0335   WBC 7.6 7.9 8.7   HGB 13.6 14.0 15.3   HCT 40.4* 41.4 46.0   MCV 91.1 91.0 90.4    260 280     CHEMISTRY:   Recent Labs     03/15/20  0540 03/16/20  0410 03/17/20 0335    141 148*   K 3.8 4.0 3.5    98* 100   CO2 28 33* 35*   BUN 13 15 21*   CREATININE 0.8 0.8 1.0   GLUCOSE 294* 401* 287*     LIVER PROFILE:   Recent Labs     03/17/20  0335   AST 47*   ALT 53*   BILIDIR <0.2 the paranasal sinuses with bilateral   mastoid effusions.

## 2020-03-17 NOTE — CONSULTS
In patient Neurology consult        [facility] Neurology      MD Jose Corona  1955    Date of Service: 3/17/2020    Referring Physician: Eduardo Carlos MD      Reason for the consult and CC: Acute and persistent encephalopathy. HPI:   Most of the history was obtained from chart reviewing and discussion with the patient's nurse as the patient is currently intubated and sedated. The patient is a 60y.o. years old male with multiple medical problems who was initially admitted to the hospital few days ago from [facility] with acute chest pain and possible needing for CABG. Patient initially admitted to [facility] with acute chest pain or breathing difficulties. Further work-up revealed triple-vessel disease. He was eventually intubated for aspiration pneumonia and airway protection. He was transferred to [facility] for possible needing CABG. Neurology has been consulted for his persistent encephalopathy. Throughout the last 3 days, the patient continues to have waxing and waning encephalopathy. He gets very agitated and restless off sedation. He is back now on fentanyl and propofol. He does have occasional brainstem function. Degree was severe and duration was persistent. No other triggers. Other associated symptoms including few minutes of disconjugate eye gaze according to his nurse. No witnessed seizure. There was concern for possible anoxic injury. Further work-up with MRI of the brain showed no acute findings. He is alcoholic and he drinks at least 16-18 beers per week according to his nurse today. No other new symptoms today and other review system was limited.     Family History   Problem Relation Age of Onset   Lewis Plunk Cancer Mother        Past Medical History:   Diagnosis Date    CAD (coronary artery disease)     [facility] [address]     Diabetes mellitus ([facility] [address]     Hyperlipidemia     Hypertension      Past Surgical History: Procedure Laterality Date    CARDIAC SURGERY      angiogram 3/2020    COLONOSCOPY       Social History     Tobacco Use    Smoking status: Former Smoker     Last attempt to quit: 3/16/1995     Years since quittin.0    Smokeless tobacco: Never Used   Substance Use Topics    Alcohol use:  Yes     Alcohol/week: 4.0 - 12.0 standard drinks     Types: 4 - 12 Cans of beer per week    Drug use: Never     Allergies   Allergen Reactions    Lipitor [Atorvastatin]      Current Facility-Administered Medications   Medication Dose Route Frequency Provider Last Rate Last Dose    acetaZOLAMIDE (DIAMOX) tablet 500 mg  500 mg Per NG tube Q12H Orvil Reusing, MD        potassium bicarbonate (K-LYTE) disintegrating tablet 50 mEq  50 mEq Per NG tube Once Orvil Reusing, MD        insulin NPH (HUMULIN N;NOVOLIN N) injection pen 30 Units  30 Units Subcutaneous Lisandro Lebron MD        insulin lispro (1 Unit Dial) 0-18 Units  0-18 Units Subcutaneous Q4H Orvil Reusing, MD   3 Units at 20 1239    chlorhexidine (PERIDEX) 0.12 % solution 15 mL  15 mL Mouth/Throat BID Orvil Reusing, MD   15 mL at 20 1044    perflutren lipid microspheres (DEFINITY) injection 1.65 mg  1.5 mL Intravenous ONCE PRN Kitty Lazaro MD        sodium chloride flush 0.9 % injection 10 mL  10 mL Intravenous 2 times per day Winnie Acevedo MD   10 mL at 20 1044    sodium chloride flush 0.9 % injection 10 mL  10 mL Intravenous PRN Winnie Acevedo MD        acetaminophen (TYLENOL) tablet 650 mg  650 mg Oral Q6H PRN Winnie Acevedo MD        Or   Sherren Pean acetaminophen (TYLENOL) suppository 650 mg  650 mg Rectal Q6H PRN Winnie Acevedo MD        polyethylene glycol (GLYCOLAX) packet 17 g  17 g Oral Daily PRN Winnie Acevedo MD        promethazine (PHENERGAN) tablet 12.5 mg  12.5 mg Oral Q6H PRN Winnie Acevedo MD        Or    ondansetron (ZOFRAN) injection 4 mg  4 mg Intravenous Q6H PRN Winnie Acevedo MD        enoxaparin (LOVENOX) injection 40 mg  40 mg Subcutaneous Daily Joey Ibrahim MD   40 mg at 03/17/20 1045    magnesium sulfate 2 g in 50 mL IVPB premix  2 g Intravenous PRN Joey Ibrahim MD        potassium chloride 10 mEq/100 mL IVPB (Peripheral Line)  10 mEq Intravenous PRN Joey Ibrahim MD        fentaNYL (SUBLIMAZE) 1,000 mcg in sodium chloride 0.9 % 100 mL infusion  1 mcg/kg/hr Intravenous Continuous Kaylie Sunshine MD 4.2 mL/hr at 03/17/20 0236 0.5 mcg/kg/hr at 03/17/20 0236    dextrose 50 % IV solution  12.5 g Intravenous PRN Joey Ibrahim MD        dextrose 5 % solution  100 mL/hr Intravenous PRN Joey Ibrahim MD        LORazepam (ATIVAN) injection 2 mg  2 mg Intravenous Q2H PRN Joey Ibrahim MD        propofol injection  10 mcg/kg/min Intravenous Titrated Jairo Tomlinson MD 22.8 mL/hr at 03/17/20 1236 45 mcg/kg/min at 03/17/20 1236    famotidine (PEPCID) injection 20 mg  20 mg Intravenous BID Jairo Tomlinson MD   20 mg at 03/17/20 1044    norepinephrine (LEVOPHED) 16 mg in dextrose 5% 250 mL infusion  2 mcg/min Intravenous Continuous Kaylie Sunshine MD   Stopped at 03/17/20 0332       ROS: 10-12 ROS was Limited to obtain secondary to intubation and encephalopathy. Otherwise rest per HPI     Exam:   Vitals:    03/17/20 0830 03/17/20 0900 03/17/20 1100 03/17/20 1220   BP:  108/63 98/60    Pulse: 67 75 67    Resp:  14 14 14   Temp:       TempSrc:       SpO2:  97% 95% 95%   Weight:       Height:         General appearance: intubated and sedated  Eye: No icterus  Fundus: Funduscopic examination could not be performed due to patient's poor cooperation and intubation. Neck: supple  Cardiovascular: No lower leg edema with good pulsation. Mental Status: intubated  Cranial Nerves:   Pupil: RRR  No gaze preference  OCR: present  Corneal: no  Cough:  no  Gag: no  Spontaneous breathing: yes  II: Pupils: equal, round, reactive to light  III,IV,VI: No gaze preference.    VII: Face is symmetric   CN from VIII to XII: NT due to intubation  Musculoskeletal: no motor response  Tone: normal  Reflexes: diminished but symmetric   Planters: mute  Coordination: Sensation and Gait/Posture: NT due to intubation    Data:  LABS:   Lab Results   Component Value Date     03/17/2020    K 3.5 03/17/2020     03/17/2020    CO2 35 03/17/2020    BUN 21 03/17/2020    CREATININE 1.0 03/17/2020    GFRAA >60 03/17/2020    LABGLOM >60 03/17/2020    GLUCOSE 287 03/17/2020    MG 2.40 03/17/2020    CALCIUM 9.0 03/17/2020     Lab Results   Component Value Date    WBC 8.7 03/17/2020    RBC 5.09 03/17/2020    HGB 15.3 03/17/2020    HCT 46.0 03/17/2020    MCV 90.4 03/17/2020    RDW 13.1 03/17/2020     03/17/2020     Lab Results   Component Value Date    INR 1.14 03/13/2020    PROTIME 13.2 03/13/2020       Neuroimaging were independently reviewed by myself   Reviewed notes from different physicians  Reviewed lab and blood testing    Impression:  Acute and persistent encephalopathy. Severe. So far no specific etiology. MRI brain reviewed and was limited by motion artifact. Would consider repeating MRI of the brain within few days depending on neuro status rule out hypoxic-ischemic encephalopathy versus ischemic strokes. Possible metabolic or toxic encephalopathy and cannot rule out hypertensive encephalopathy with initial blood pressure elevation on admission to [facility]. Acute respiratory failure with hypoxia  CAD  Hypertension  Hyperlipidemia  CHF  Aspiration pneumonia  DM, not controlled. Recommendation:  Prolonged EEG to rule out nonconvulsive seizures.   Repeat MRI brain at some point in next few days depending on his neuro status and progress  Try to wean sedation if possible  Continue respiratory support  Blood pressure monitor and continue current medications  Aspirin for stroke prevention  Telemetry  DVT and GI prophylaxis  Insulin sliding scale and BS control  Continue antibiotics  Discussed with ICU nurse  Prognosis continues to be guarded  We will follow. Thank you for referring such patient. If you have any questions regarding my consult note, please don't hesitate to call me. Manjeet Cartagena MD  [phone]    This dictation was generated by voice recognition computer software.  Although all attempts are made to edit the dictation for accuracy, there may be errors in the  transcription that are not intended

## 2020-03-17 NOTE — CARE COORDINATION
Discharge Planning Assessment  RN/SW discharge planner met with patient/ (and family member) to discuss reason for admission, current living situation, and potential needs at the time of discharge    CM attempted to call family re: DCPA. Information gathered from [facility], bedside RNs, MDs. Demographics/Insurance verified Yes - none     Current type of dwelling: house    Patient from ECF/ confirmed with: n/a    Living arrangements: w/spouse Terra Goldmann - [phone])    Level of function/Support:  Independent (still employed)    PCP: none    Last Visit to PCP:  unknown    DME:  unknown    Active with any community resources/agencies/skilled home care: none    Medication compliance issues: none    Financial issues that could impact healthcare: none    Tentative discharge plan: SNF (pending)    Discussed and provided facilities of choice if transition to a skilled nursing facility is required at the time of discharge attempted    Discussed with patient and/or family that on the day of discharge home tentative time of discharge will be between 10 AM and noon. Transportation at the time of discharge: per CM    Will continue to update discharge plan as more information is available.     CALVIN PrajapatiN, RN   [phone]

## 2020-03-17 NOTE — PROGRESS NOTES
[facility]  Daily Progress Note    Admit Date:  3/13/2020      CC:  CAD, respiratory failure, neuro status changes  Subj:  Today, remains intubated and sedated. Not following commands with sedation vacation per nursing. Obj:   BP (!) 100/59   Pulse 66   Temp 97.6 °F (36.4 °C) (Temporal)   Resp 14   Ht 5' 7.5\" (1.715 m)   Wt 167 lb 1.7 oz (75.8 kg)   SpO2 96%   BMI 25.79 kg/m²       Intake/Output Summary (Last 24 hours) at 3/17/2020 0916  Last data filed at 3/17/2020 0630  Gross per 24 hour   Intake 2510 ml   Output 2260 ml   Net 250 ml     Gen I/s Heart  RRR, no MRG, nl apical impulse   Head NC, AT, no abnorm Abd  Soft, NT, +BS, no mass, no OM   Eyes PERRLA, conj/corn clear Ext  Ext nl, AT, no C/C/E   Nose Nares nl, no drain, NT Pulse decr   Throat Lips, mucosa, tongue nl Skin Color/text/turg nl, no rash/lesions   Neck S/S, TM, NT, no bruit/JVD Psych def   Lung vbs Lymph   No cervical or axillary LA   Ch wall NT, no deform Neuro  def     Medications:    insulin NPH  25 Units Subcutaneous Q8H    insulin lispro  0-18 Units Subcutaneous Q4H    chlorhexidine  15 mL Mouth/Throat BID    sodium chloride flush  10 mL Intravenous 2 times per day    enoxaparin  40 mg Subcutaneous Daily    famotidine (PEPCID) injection  20 mg Intravenous BID      fentaNYL (SUBLIMAZE) infusion 0.5 mcg/kg/hr (03/17/20 0236)    dextrose      propofol 45 mcg/kg/min (03/17/20 0617)    norepinephrine Stopped (03/17/20 0332)     Lab Data: Relevant and available CV data reviewed    Plan:  ~NSTEMI/CAD/ICM/Acute systolic CHF  LHC with severe mid LAD disease, cx with luminal irregs and RCA an acute occlusion  RCA not revascularized at time of diagnostic LHC at [facility]  Plan          · No CABG due to recent inferior MI and neuro status, discussed with CTS  · If/when neuro status clarified/improved, PCI of LAD +/-RCA  ~Acute respiratory failure  Thought secondary to aspiration? Versus CHF?   Plan        CC managing  ~Apical thrombus? Suggested by [address], no evidence by repeat echo with definity  ~Aortic valve vegetation? Suggested by [address], AV appears sclerotic without vegetation by repeat echo  ~Neuro status  Apparently altered at time of ER arrival, intubated ever since  Plan Critical care assistance with extubation and establish neuro condition    Critical Care   Due to the high probability of clinically significant life threating deterioration of the patient's condition that required my urgent intervention, a total critical care time of >35 minutes was used. This time excludes any time that may have been spent performing procedures. This includes but not limited to vital sign monitoring, telemetry monitoring, continuous pulse oximety, IV medication, clinical response to the IV medications, documentation time , consultation time, interpretation of lab data, review of nursing notes and old record review.      All questions and concerns were addressed to the patient/family. Alternatives to my treatment were discussed. The note was completed using EMR.  Every effort was made to ensure accuracy; however, inadvertent computerized transcription errors may be present.

## 2020-03-17 NOTE — PROGRESS NOTES
03/17/20 0730   Vent Patient Data   Plateau Pressure 15 LNI32   Static Compliance 46.3 mL/cmH2O   Dynamic Compliance 23.15 mL/cmH2O

## 2020-03-17 NOTE — PROGRESS NOTES
Sedation vacation performed. Pt with focal deviation and consistent yawning. Pt moves all extremites but with no purpose, not able to follow any commands. Sedation restarted to maintain pt comfort.

## 2020-03-18 LAB
ANION GAP SERPL CALCULATED.3IONS-SCNC: 13 MMOL/L (ref 3–16)
BASE EXCESS ARTERIAL: 1 MMOL/L (ref -3–3)
BASE EXCESS ARTERIAL: 3.2 MMOL/L (ref -3–3)
BASOPHILS ABSOLUTE: 0.1 K/UL (ref 0–0.2)
BASOPHILS RELATIVE PERCENT: 0.8 %
BUN BLDV-MCNC: 26 MG/DL (ref 7–20)
CALCIUM SERPL-MCNC: 9.3 MG/DL (ref 8.3–10.6)
CARBOXYHEMOGLOBIN ARTERIAL: 0.4 % (ref 0–1.5)
CARBOXYHEMOGLOBIN ARTERIAL: 0.6 % (ref 0–1.5)
CHLORIDE BLD-SCNC: 100 MMOL/L (ref 99–110)
CO2: 30 MMOL/L (ref 21–32)
CREAT SERPL-MCNC: 1 MG/DL (ref 0.8–1.3)
EOSINOPHILS ABSOLUTE: 0.8 K/UL (ref 0–0.6)
EOSINOPHILS RELATIVE PERCENT: 8 %
GFR AFRICAN AMERICAN: >60
GFR NON-AFRICAN AMERICAN: >60
GLUCOSE BLD-MCNC: 156 MG/DL (ref 70–99)
GLUCOSE BLD-MCNC: 164 MG/DL (ref 70–99)
GLUCOSE BLD-MCNC: 186 MG/DL (ref 70–99)
GLUCOSE BLD-MCNC: 193 MG/DL (ref 70–99)
GLUCOSE BLD-MCNC: 217 MG/DL (ref 70–99)
GLUCOSE BLD-MCNC: 229 MG/DL (ref 70–99)
GLUCOSE BLD-MCNC: 282 MG/DL (ref 70–99)
HCO3 ARTERIAL: 26.5 MMOL/L (ref 21–29)
HCO3 ARTERIAL: 28.7 MMOL/L (ref 21–29)
HCT VFR BLD CALC: 45 % (ref 40.5–52.5)
HEMOGLOBIN, ART, EXTENDED: 15.1 G/DL (ref 13.5–17.5)
HEMOGLOBIN, ART, EXTENDED: 15.8 G/DL (ref 13.5–17.5)
HEMOGLOBIN: 14.9 G/DL (ref 13.5–17.5)
LYMPHOCYTES ABSOLUTE: 1.6 K/UL (ref 1–5.1)
LYMPHOCYTES RELATIVE PERCENT: 15.5 %
MAGNESIUM: 2.4 MG/DL (ref 1.8–2.4)
MCH RBC QN AUTO: 30.5 PG (ref 26–34)
MCHC RBC AUTO-ENTMCNC: 33.2 G/DL (ref 31–36)
MCV RBC AUTO: 91.9 FL (ref 80–100)
METHEMOGLOBIN ARTERIAL: 0.5 %
METHEMOGLOBIN ARTERIAL: 0.5 %
MONOCYTES ABSOLUTE: 1.2 K/UL (ref 0–1.3)
MONOCYTES RELATIVE PERCENT: 11.2 %
NEUTROPHILS ABSOLUTE: 6.7 K/UL (ref 1.7–7.7)
NEUTROPHILS RELATIVE PERCENT: 64.5 %
O2 CONTENT ARTERIAL: 21 ML/DL
O2 CONTENT ARTERIAL: 22 ML/DL
O2 SAT, ARTERIAL: 98.4 %
O2 SAT, ARTERIAL: 98.6 %
O2 THERAPY: ABNORMAL
O2 THERAPY: NORMAL
PCO2 ARTERIAL: 44.2 MMHG (ref 35–45)
PCO2 ARTERIAL: 45.6 MMHG (ref 35–45)
PDW BLD-RTO: 13.2 % (ref 12.4–15.4)
PERFORMED ON: ABNORMAL
PH ARTERIAL: 7.39 (ref 7.35–7.45)
PH ARTERIAL: 7.41 (ref 7.35–7.45)
PLATELET # BLD: 271 K/UL (ref 135–450)
PMV BLD AUTO: 8.9 FL (ref 5–10.5)
PO2 ARTERIAL: 108 MMHG (ref 75–108)
PO2 ARTERIAL: 108 MMHG (ref 75–108)
POTASSIUM REFLEX MAGNESIUM: 3.3 MMOL/L (ref 3.5–5.1)
RBC # BLD: 4.9 M/UL (ref 4.2–5.9)
SODIUM BLD-SCNC: 143 MMOL/L (ref 136–145)
TCO2 ARTERIAL: 62.4 MMOL/L
TCO2 ARTERIAL: 67.3 MMOL/L
TRIGL SERPL-MCNC: 199 MG/DL (ref 0–150)
WBC # BLD: 10.4 K/UL (ref 4–11)

## 2020-03-18 PROCEDURE — 94770 HC ETCO2 MONITOR DAILY: CPT

## 2020-03-18 PROCEDURE — 2700000000 HC OXYGEN THERAPY PER DAY

## 2020-03-18 PROCEDURE — 80048 BASIC METABOLIC PNL TOTAL CA: CPT

## 2020-03-18 PROCEDURE — 82803 BLOOD GASES ANY COMBINATION: CPT

## 2020-03-18 PROCEDURE — 2580000003 HC RX 258: Performed by: INTERNAL MEDICINE

## 2020-03-18 PROCEDURE — 6360000002 HC RX W HCPCS: Performed by: INTERNAL MEDICINE

## 2020-03-18 PROCEDURE — 94003 VENT MGMT INPAT SUBQ DAY: CPT | Performed by: INTERNAL MEDICINE

## 2020-03-18 PROCEDURE — 2000000000 HC ICU R&B

## 2020-03-18 PROCEDURE — 2500000003 HC RX 250 WO HCPCS: Performed by: INTERNAL MEDICINE

## 2020-03-18 PROCEDURE — 6370000000 HC RX 637 (ALT 250 FOR IP): Performed by: INTERNAL MEDICINE

## 2020-03-18 PROCEDURE — 94003 VENT MGMT INPAT SUBQ DAY: CPT

## 2020-03-18 PROCEDURE — 95813 EEG EXTND MNTR 61-119 MIN: CPT | Performed by: PSYCHIATRY & NEUROLOGY

## 2020-03-18 PROCEDURE — 99233 SBSQ HOSP IP/OBS HIGH 50: CPT | Performed by: PSYCHIATRY & NEUROLOGY

## 2020-03-18 PROCEDURE — 94750 HC PULMONARY COMPLIANCE STUDY: CPT

## 2020-03-18 PROCEDURE — 83735 ASSAY OF MAGNESIUM: CPT

## 2020-03-18 PROCEDURE — 85025 COMPLETE CBC W/AUTO DIFF WBC: CPT

## 2020-03-18 PROCEDURE — 94761 N-INVAS EAR/PLS OXIMETRY MLT: CPT

## 2020-03-18 PROCEDURE — 99291 CRITICAL CARE FIRST HOUR: CPT | Performed by: INTERNAL MEDICINE

## 2020-03-18 PROCEDURE — 95819 EEG AWAKE AND ASLEEP: CPT

## 2020-03-18 RX ORDER — LORAZEPAM 2 MG/ML
1 INJECTION INTRAMUSCULAR EVERY 30 MIN PRN
Status: DISCONTINUED | OUTPATIENT
Start: 2020-03-18 | End: 2020-03-18

## 2020-03-18 RX ORDER — POTASSIUM CHLORIDE 29.8 MG/ML
20 INJECTION INTRAVENOUS PRN
Status: DISCONTINUED | OUTPATIENT
Start: 2020-03-18 | End: 2020-03-23

## 2020-03-18 RX ORDER — DEXTROSE AND SODIUM CHLORIDE 5; .45 G/100ML; G/100ML
INJECTION, SOLUTION INTRAVENOUS CONTINUOUS
Status: DISCONTINUED | OUTPATIENT
Start: 2020-03-18 | End: 2020-03-19

## 2020-03-18 RX ORDER — SODIUM CHLORIDE 9 MG/ML
INJECTION, SOLUTION INTRAVENOUS
Status: DISPENSED
Start: 2020-03-18 | End: 2020-03-18

## 2020-03-18 RX ADMIN — FAMOTIDINE 20 MG: 10 INJECTION INTRAVENOUS at 08:04

## 2020-03-18 RX ADMIN — INSULIN LISPRO 6 UNITS: 100 INJECTION, SOLUTION INTRAVENOUS; SUBCUTANEOUS at 04:42

## 2020-03-18 RX ADMIN — INSULIN LISPRO 3 UNITS: 100 INJECTION, SOLUTION INTRAVENOUS; SUBCUTANEOUS at 16:55

## 2020-03-18 RX ADMIN — FOLIC ACID: 5 INJECTION, SOLUTION INTRAMUSCULAR; INTRAVENOUS; SUBCUTANEOUS at 11:40

## 2020-03-18 RX ADMIN — DEXTROSE AND SODIUM CHLORIDE: 5; 450 INJECTION, SOLUTION INTRAVENOUS at 17:08

## 2020-03-18 RX ADMIN — PROPOFOL 50 MCG/KG/MIN: 10 INJECTION, EMULSION INTRAVENOUS at 06:00

## 2020-03-18 RX ADMIN — LORAZEPAM 1 MG: 2 INJECTION INTRAMUSCULAR; INTRAVENOUS at 11:39

## 2020-03-18 RX ADMIN — INSULIN HUMAN 30 UNITS: 100 INJECTION, SUSPENSION SUBCUTANEOUS at 04:42

## 2020-03-18 RX ADMIN — POTASSIUM CHLORIDE 20 MEQ: 29.8 INJECTION, SOLUTION INTRAVENOUS at 06:48

## 2020-03-18 RX ADMIN — INSULIN LISPRO 3 UNITS: 100 INJECTION, SOLUTION INTRAVENOUS; SUBCUTANEOUS at 13:17

## 2020-03-18 RX ADMIN — PROPOFOL 55 MCG/KG/MIN: 10 INJECTION, EMULSION INTRAVENOUS at 02:51

## 2020-03-18 RX ADMIN — ACETAZOLAMIDE 500 MG: 250 TABLET ORAL at 00:09

## 2020-03-18 RX ADMIN — Medication 10 ML: at 19:44

## 2020-03-18 RX ADMIN — INSULIN LISPRO 3 UNITS: 100 INJECTION, SOLUTION INTRAVENOUS; SUBCUTANEOUS at 19:45

## 2020-03-18 RX ADMIN — Medication 10 ML: at 08:04

## 2020-03-18 RX ADMIN — LORAZEPAM 1 MG: 2 INJECTION INTRAMUSCULAR; INTRAVENOUS at 14:05

## 2020-03-18 RX ADMIN — POTASSIUM CHLORIDE 20 MEQ: 29.8 INJECTION, SOLUTION INTRAVENOUS at 05:08

## 2020-03-18 RX ADMIN — PROPOFOL 55 MCG/KG/MIN: 10 INJECTION, EMULSION INTRAVENOUS at 00:14

## 2020-03-18 RX ADMIN — INSULIN LISPRO 6 UNITS: 100 INJECTION, SOLUTION INTRAVENOUS; SUBCUTANEOUS at 08:04

## 2020-03-18 RX ADMIN — CHLORHEXIDINE GLUCONATE 15 ML: 1.2 RINSE ORAL at 08:05

## 2020-03-18 RX ADMIN — ENOXAPARIN SODIUM 40 MG: 40 INJECTION SUBCUTANEOUS at 08:04

## 2020-03-18 RX ADMIN — INSULIN LISPRO 6 UNITS: 100 INJECTION, SOLUTION INTRAVENOUS; SUBCUTANEOUS at 00:02

## 2020-03-18 ASSESSMENT — PAIN SCALES - GENERAL
PAINLEVEL_OUTOF10: 0

## 2020-03-18 ASSESSMENT — PULMONARY FUNCTION TESTS
PIF_VALUE: 26
PIF_VALUE: 29
PIF_VALUE: 26
PIF_VALUE: 30
PIF_VALUE: 14
PIF_VALUE: 32

## 2020-03-18 NOTE — PROCEDURES
3/18/2020     Patient: Rossy Stringer    MR Number: [id number]  YOB: 1955  Date of Visit: 3/18/2020    Clinical History:    The patient is a 60y.o. years old male with persistent encephalopathy. Rule out nonconvulsive seizure. This is a prolonged 45 minutes duration EEG. Method: The EEG was performed utilizing the international 10/20 of electrode placements of both referential and bipolar montages. The patient is in coma through out the recording. Findings: The background of the EEG showed generalized diffuse slowing through out the recording in mixture of theta and delta. This generalized slowing was symmetric, non rhythmical, and continuous. No spike or sharp waves were seen. No EEG seizures or periodic pattern. Impression: This prolonged EEG is abnormal. The generalized diffuse slowing is suggestive of moderate diffuse encephalopathy. There is no evidence of epileptiform discharges, focal, or lateralizing abnormalities.         Andie Lutz MD      Board certified in clinical neurophysiology

## 2020-03-18 NOTE — PROGRESS NOTES
When extubated, right side facial droop noted, pt did have MRI 3/16/2020, pt had movement during MRI,  no infarct noted in result. Neuro may do repeat MRI once sedation has wore off. Critical care MD in room at time, pt opens eyes to his name, pt weakly squeezes with both hands, able to move both legs and wiggle toes. Pt able to mouth the word \"hi\". will continue to assess patient.  O2 SAT continue to be stable with 4 L O2 NC.

## 2020-03-18 NOTE — ADT AUTH CERT
intervention after extubation per cardiology.       Doubt aspiration pneumonia, stop antibiotics.       Lovenox and Pepcid for prophylaxis.       Critical care team will follow. ---------------------------      Internal med Progress Note 3/15/20:      Subjective:     Patient intubated and sedated. Not following commands. Having good UOP.        Objective:   /69   Pulse 71   Temp 98.7 °F (37.1 °C) (Temporal)   Resp 14   Ht 5' 7.5\" (1.715 m)   Wt 175 lb 14.8 oz (79.8 kg)   SpO2 94%   BMI 27.15 kg/m²        Intake/Output Summary (Last 24 hours) at 3/15/2020 1300   Last data filed at 3/15/2020 1000   Gross per 24 hour   Intake 1698.71 ml   Output 3775 ml   Net -2076.29 ml       Wt Readings from Last 3 Encounters:   03/15/20 175 lb 14.8 oz (79.8 kg)           General appearance:  Sedated and calm   HEENT: atraumatic, Pupils equal, muscous membranes moist, no masses appreciated   Cardiovascular: Regular rate and rhythm no murmurs cap refill < 2sec    Respiratory:bibasilar crackles   Gastrointestinal: Abdomen soft, non-tender, BS+   EXT: trace edema   Neurology:sedated   Psychiatry: sedated   Skin: Warm, dry, no rashes appreciated       Labs and Tests:   CBC:    Recent Labs     03/13/20 2030 03/14/20   0355 03/15/20   0540   WBC 9.2 9.1 7.6   HGB 13.0* 12.5* 13.6    214 223       BMP:     Recent Labs     03/13/20 2030 03/14/20   0355 03/15/20   0540    141 142   K 4.3 4.0 3.8    107 101   CO2 25 25 28   BUN 14 12 13   CREATININE 0.8 0.8 0.8   GLUCOSE 155* 146* 294*      Principal Problem:     NSTEMI (non-ST elevated myocardial infarction) ([facility])   Active Problems:     Congestive heart failure of unknown etiology ([facility])     3-vessel coronary artery disease     HTN (hypertension)     Uncontrolled type 2 diabetes mellitus with hyperglycemia (HCC)     Acute systolic (congestive) heart failure ([facility])     Acute respiratory failure with hypoxia ([facility])     Aspiration pneumonia ([facility] [address]   Resolved Problems:     * No resolved hospital problems.  *           Assessment & Plan:    Acute hypoxic respiratory failure secondary to new onset acute systolic heart failure    - continue vent management per pulm   - chest x-ray shows continued signs of pulmonary edema   - continue IV lasix chech bmp and mag in am and replace as needed   - zosyn dc'd, doubt aspiration pneumonia        NSTEMI:  3 vessel CAD, discuss with cards need for ac    - Echo-Left ventricle - normal in size and thickness, function is reduced with EF    of 40%   - PCI of LAD +/-RCA once extubated        DM2:    - tolerating tube feeds at goal    - ISS        Diet: DIET TUBE FEED CONTINUOUS/CYCLIC NPO; STANDARD WITHOUT FIBER; Orogastric; Continuous   Code:Full Code   DVT PPXlovenox   Disposition  Pending extubation   -----------------------------                Myocardial Infarction - Care Day 2 (3/14/2020) by Cristóbal Charles RN         Review Status Review Entered   Completed 3/18/2020 08:10       Criteria Review      Care Day: 2 Care Date: 3/14/2020 Level of Care:    Guideline Day 2    Clinical Status    (X) * Hemodynamic stability    3/18/2020 8:10 AM EDT by Maude Schwartz      135/87 - 88 - 16    ( ) * Chest pain, dyspnea, or anginal equivalent absent    3/18/2020 8:10 AM EDT by Maude Schwartz      Pt intubated and sedated and calm    Routes    ( ) * Oral hydration, medications    3/18/2020 8:10 AM EDT by Maude Schwartz      Pt is NPO    Interventions    ( ) * Oxygen absent    3/18/2020 8:10 AM EDT by Maude Schwartz      Pt is intubated--on mechanical ventilation    Medications    (X) * IV nitroglycerin absent    3/18/2020 8:10 AM EDT by Maude Schwartz      Pt is not on an IV Nitro gtt today    (X) Anticoagulation    3/18/2020 8:10 AM EDT by Maude Schwartz      Enoxaparin 40mg sc daily    * Milestone   Additional Notes   3/14/20  Care Day 2   Pulmonology Consult Note:       HISTORY OF PRESENT ILLNESS:     This is a  60 y.o. male, presented to an outside hospital two days ago with chest pain and SOB progressively got worse over 1 week and was found to have CHF and elevated troponin for which he was intubated. [facility] had left heart cath which showed three vessel disease for which he was transferred to us on vent for evaluation for GABG.     At the time of evaluation he was sedated with ativan 3 mg/hr.  He open eyes to touch and trach but does not follow commands.        PHYSICAL EXAM:   /87   Pulse 88   Temp 98.2 °F (36.8 °C) (Temporal)   Resp 16   Wt 186 lb 1.1 oz (84.4 kg)   SpO2 98% --on ventilator       Physical Exam   Vitals signs reviewed. Constitutional:        General: He is not in acute distress.      Appearance: He is well-developed. He is not ill-appearing.       Comments: Intubated and sedated    HENT:       Head: Normocephalic and atraumatic. Eyes:       Pupils: Pupils are equal, round, and reactive to light. Neck:       Musculoskeletal: Neck supple.       Vascular: No JVD. Cardiovascular:       Rate and Rhythm: Normal rate and regular rhythm.       Heart sounds: No murmur. Pulmonary:       Effort: Pulmonary effort is normal. No respiratory distress.       Breath sounds: Normal breath sounds. No stridor. No wheezing or rales. Abdominal:       General: Bowel sounds are normal.       Palpations: Abdomen is soft. Musculoskeletal:          General: No deformity.    Right lower leg: No edema.       Left lower leg: No edema. Skin:      General: Skin is warm and dry.     Neurological:       Comments: Non focal exam         LABS:   Recent Labs     03/13/20 2030   WBC 9.2   HGB 13.0*   HCT 38.8*                                                                      Recent Labs     03/13/20 2030      K 4.3      CO2 25   BUN 14   CREATININE 0.8   GLUCOSE 155*       Recent Labs     03/13/20 2030   TROPONINI 0.26*       Recent Labs     03/13/20 2030   INR 1.14      DATA: CXR with bilateral pleural effusion        Assessment &Plan:    Patient Active Problem List:      Congestive heart failure of unknown etiology ([facility] [address]      3-vessel coronary artery disease      NSTEMI (non-ST elevated myocardial infarction) ([facility] [address]      HTN (hypertension)      Uncontrolled type 2 diabetes mellitus with hyperglycemia (HCC)      Acute systolic (congestive) heart failure (HCC)      Acute respiratory failure with hypoxia (HCC)      Aspiration pneumonia (HCC)        Acute respiratory failure on mechanical vent support    NSTEMI /  three vessel disease is here for evaluation for CABG   Bilateral pleural effusion    Systolic CHF with EF of 53%   Renal function is stable    No leukocytosis    Hyperglycemia        Continue vent support: increase VT to 450, decrease rate to 14, decrease PEEP to 5   D/c lorazepam    Start propofol and fentanyl   Lasix 20 mg BID   Recheck ABG   SSI   Consult dietician for TF    Lovenox/pepcid        Pt has a high probability of imminent or life-threatening deterioration requiring close monitoring, and highly complex decision-making and/or interventions of high intensity to assess, manipulate, and support his critical organ systems to prevent a likely inevitable decline which could occur if left untreated. ----------------------------       Cardiology Consult Note:      REASON FOR CONSULT/CHIEF COMPLAINT/HPI       RFC/CC Chest pain   HPI Rossy Stringer is a 60 y.o.admitted to [facility]. [address] with sob.  Intubated in ER due to sob and confusion.  BP in /180Trop 1.0.  CTC pulmonary edema, mp effusions.  Echo with EF of 25-30% possible apical thrombus, possible vegetation on aortic valve. RUMA RETREAT performed and recommended CABG however noted RCA likely acute vessel.   CTH negative.        PHYSICAL EXAM         Vitals:     03/14/20 0630   BP: 109/63   Pulse: 65   Resp: 13   Temp:     SpO2: 95%    Weight: 180 lb 1.9 oz (81.7 kg)      Gen I/s Heart Rrr, no mrg   Head NC, AT, no abnorm Abd Soft, NT, +BS, no mass, no OM   Eyes PERRLA, conj/corn clear Ext Ext nl, AT, no C/C/E   Nose Nares nl, no drain, NT Pulse decr   Throat Lips, mucosa, tongue nl Skin Color/text/turg nl, no rash/lesions   Neck S/S, TM, NT, no bruit/JVD Psych def   Lung vbs Lymph   No cervical or axillary LA   Ch wall NT, no deform Neuro def       LABS       Reviewed available and relevant CV labs       ASSESSMENT AND PLAN       ~NSTEMI/CAD/ICM/Acute systolic CHF   LH with severe mid LAD disease, cx with luminal irregs and RCA appears to be an acute occlusion   RCA not revascularized at time of diagnostic [facility]   Troponin declining   Plan        With depressed EF and acute inferior MI, not sure ECABG best revasc strategy                  Will discuss with CTS today, however likely revasc RCA and LAD percutaneously                  Imaging relatively poor quality regarding LM stenosis and would need more critical reassessment                  Will discuss with family and CTS today                  Start lasix, needs diuresis to help facilitate vent wean                  No intervention until neuro status clarified as notes suggest altered in ER                  Unrevascularized now subacute inferior infarct, watch closely for mechanical complications - TTE                  TTE to reevaluate LV/RV function, aortic valve vegetation?, apical thrombus? ~Acute respiratory failure   Thought secondary to aspiration? Versus CHF? Plan       [facility] consultation for vent/abx management                  Diuresis   ~Apical thrombus? [facility] [address]   Plan        TTE with definity   ~Aortic valve vegetation? [facility] [address] suggestive?    Plan        Repeat TTE as above   ------------------------      Internal Med Progress Note:      Objective:   /60   Pulse 62   Temp 98.6 °F (37 °C) (Temporal)   Resp 10   Wt 180 lb 1.9 oz (81.7 kg)   SpO2 96%        Intake/Output Summary (Last 24 hours) at 3/14/2020 0841   Last data filed at 3/14/2020 5347   Gross per 24 hour   Intake 426 ml   Output 1165 ml   Net -739 ml       Wt Readings from Last 3 Encounters:   03/14/20 180 lb 1.9 oz (81.7 kg)       General appearance:  Sedated and calm   HEENT: atraumatic, Pupils equal, muscous membranes moist, no masses appreciated   Cardiovascular: Regular rate and rhythm no murmurs cap refill < 2sec    Respiratory:bibasilar crackles   Gastrointestinal: Abdomen soft, non-tender, BS+   EXT: trace edema   Neurology:sedated   Psychiatry: sedated   Skin: Warm, dry, no rashes appreciated       Labs and Tests:   CBC:    Recent Labs     03/13/20   2030 03/14/20   0355   WBC 9.2 9.1   HGB 13.0* 12.5*    214       BMP:     Recent Labs     03/13/20 2030 03/14/20   0355    141   K 4.3 4.0    107   CO2 25 25   BUN 14 12   CREATININE 0.8 0.8   GLUCOSE 155* 146*       Hepatic: No results for input(s): AST, ALT, ALB, BILITOT, ALKPHOS in the last 72 hours. XR CHEST PORTABLE   Final Result   1. Radiographic findings most suggestive of pulmonary edema/CHF. 2. Recommend advancing enteric tube by 5 cm. 3. Additional support devices as described above.               Recent imaging reviewed       Problem List   Principal Problem:     NSTEMI (non-ST elevated myocardial infarction) ([facility] [address]   Active Problems:     Congestive heart failure of unknown etiology ([facility] [address]     3-vessel coronary artery disease     HTN (hypertension)     Uncontrolled type 2 diabetes mellitus with hyperglycemia (HCC)     Acute systolic (congestive) heart failure (HCC)     Acute respiratory failure with hypoxia (HCC)     Aspiration pneumonia ([facility] [address]   Resolved Problems:     * No resolved hospital problems. *           Assessment & Plan:    Acute hypoxic respiratory failure secondary to new onset acute systolic heart failure and ?  Aspiration pna   -  Continue vent management per pulm   - continue IV lasix chech bmp and mag in am and replace as needed   - continue zosyn day 2 for now fu cultures   - echo pending ? Of apical thrombus and vegetation from outside hospital per cards       NSTEMI:  3 vessel CAD, discuss with cards need for ac    - ct surgery consult jennifer       Acute encephalopathy?  Metabolic: sedation vacation today and assess if no improvement MRI Brain today       Hypotension secondary to propofol:  Continue levophed to keep map > 65       Dm2: ssi   -------------------------

## 2020-03-18 NOTE — PROGRESS NOTES
[facility]    3/18/2020 9:06 AM        Name: Scott Orellana . Admitted: 3/13/2020  Primary Care Provider: No primary care provider on file.  (Tel: None)      Subjective:    Patient with no acute overnight events off leveophed  Reviewed interval ancillary notes    Current Medications  potassium chloride 20 mEq/50 mL IVPB (Central Line), PRN  sodium chloride 0.9 % infusion,   sodium chloride 0.9 % 050 mL with folic acid 1 mg, adult multi-vitamin with vitamin k 10 mL, thiamine 300 mg, Daily  insulin NPH (HUMULIN N;NOVOLIN N) injection pen 32 Units, Q8H  insulin lispro (1 Unit Dial) 0-18 Units, Q4H  chlorhexidine (PERIDEX) 0.12 % solution 15 mL, BID  perflutren lipid microspheres (DEFINITY) injection 1.65 mg, ONCE PRN  sodium chloride flush 0.9 % injection 10 mL, 2 times per day  sodium chloride flush 0.9 % injection 10 mL, PRN  acetaminophen (TYLENOL) tablet 650 mg, Q6H PRN    Or  acetaminophen (TYLENOL) suppository 650 mg, Q6H PRN  polyethylene glycol (GLYCOLAX) packet 17 g, Daily PRN  promethazine (PHENERGAN) tablet 12.5 mg, Q6H PRN    Or  ondansetron (ZOFRAN) injection 4 mg, Q6H PRN  enoxaparin (LOVENOX) injection 40 mg, Daily  magnesium sulfate 2 g in 50 mL IVPB premix, PRN  fentaNYL (SUBLIMAZE) 1,000 mcg in sodium chloride 0.9 % 100 mL infusion, Continuous  dextrose 50 % IV solution, PRN  dextrose 5 % solution, PRN  LORazepam (ATIVAN) injection 2 mg, Q2H PRN  propofol injection, Titrated  famotidine (PEPCID) injection 20 mg, BID  norepinephrine (LEVOPHED) 16 mg in dextrose 5% 250 mL infusion, Continuous        Objective:  /68   Pulse 82   Temp 98.8 °F (37.1 °C) (Temporal)   Resp 16   Ht 5' 7.5\" (1.715 m)   Wt 163 lb 9.3 oz (74.2 kg)   SpO2 97%   BMI 25.24 kg/m²     Intake/Output Summary (Last 24 hours) at 3/18/2020 0906  Last data filed at 3/18/2020 0700  Gross per 24 hour   Intake 2365.3 ml Output 1255 ml   Net 1110.3 ml      Wt Readings from Last 3 Encounters:   03/18/20 163 lb 9.3 oz (74.2 kg)       General appearance:  Sedated and calm   HEENT: atraumatic, Pupils equal, muscous membranes moist, no masses appreciated  Cardiovascular: Regular rate and rhythm no murmurs cap refill < 2sec   Respiratory:bibasilar crackles  Gastrointestinal: Abdomen soft, non-tender, BS+  EXT:no edema  Neurology:sedated  Psychiatry: sedated  Skin: Warm, dry, no rashes appreciated    Labs and Tests:  CBC:   Recent Labs     03/16/20 0410 03/17/20  0335 03/18/20  0420   WBC 7.9 8.7 10.4   HGB 14.0 15.3 14.9    280 271     BMP:    Recent Labs     03/16/20 0410 03/17/20 0335 03/18/20  0420    148* 143   K 4.0 3.5 3.3*   CL 98* 100 100   CO2 33* 35* 30   BUN 15 21* 26*   CREATININE 0.8 1.0 1.0   GLUCOSE 401* 287* 282*     Hepatic:   Recent Labs     03/17/20 0335   AST 47*   ALT 53*   BILITOT <0.2   ALKPHOS 76     XR CHEST PORTABLE   Final Result   1. NG tube tip is barely in the stomach and side hole is near the GE   junction. This should be further advanced. 2. Bibasilar infiltrates and bilateral pleural effusions are unchanged. MRI BRAIN WO CONTRAST   Final Result   1. Extensive patient motion limits evaluation. 2. No convincing acute intracranial abnormality or acute infarct within the   limitations of patient motion. 3. Mild global parenchymal volume loss with chronic microvascular ischemic   changes. 4. Scattered mucosal thickening of the paranasal sinuses with bilateral   mastoid effusions. XR CHEST PORTABLE   Final Result   No significant interval change. XR CHEST PORTABLE   Final Result   1. Radiographic findings most suggestive of pulmonary edema/CHF. 2. Recommend advancing enteric tube by 5 cm. 3. Additional support devices as described above.              Recent imaging reviewed    Problem List  Principal Problem:    NSTEMI (non-ST elevated myocardial infarction)

## 2020-03-18 NOTE — PROGRESS NOTES
Leak test performed with a positive leak test. Pts leak was 57.5%, there was no audible leak heard but when auscultated with stethoscope, you could hear the leak.

## 2020-03-18 NOTE — PROGRESS NOTES
[facility]  Daily Progress Note    Admit Date:  3/13/2020      CC:  CAD, respiratory failure, neuro status changes  Subj:  Today, remains intubated and sedated. History reviewed including excessive alcohol use. Obj:   /70   Pulse 80   Temp 98.8 °F (37.1 °C) (Temporal)   Resp 15   Ht 5' 7.5\" (1.715 m)   Wt 163 lb 9.3 oz (74.2 kg)   SpO2 98%   BMI 25.24 kg/m²       Intake/Output Summary (Last 24 hours) at 3/18/2020 0738  Last data filed at 3/18/2020 0700  Gross per 24 hour   Intake 2615.3 ml   Output 1255 ml   Net 1360.3 ml     Gen I/s Heart  RRR, no MRG, nl apical impulse   Head NC, AT, no abnorm Abd  Soft, NT, +BS, no mass, no OM   Eyes PERRLA, conj/corn clear Ext  Ext nl, AT, no C/C/E   Nose Nares nl, no drain, NT Pulse decr   Throat Lips, mucosa, tongue nl Skin Color/text/turg nl, no rash/lesions   Neck S/S, TM, NT, no bruit/JVD Psych def   Lung vbs Lymph   No cervical or axillary LA   Ch wall NT, no deform Neuro  def     Medications:    insulin NPH  30 Units Subcutaneous Q8H    insulin lispro  0-18 Units Subcutaneous Q4H    chlorhexidine  15 mL Mouth/Throat BID    sodium chloride flush  10 mL Intravenous 2 times per day    enoxaparin  40 mg Subcutaneous Daily    famotidine (PEPCID) injection  20 mg Intravenous BID      sodium chloride      fentaNYL (SUBLIMAZE) infusion Stopped (03/17/20 1726)    dextrose      propofol 49.961 mcg/kg/min (03/18/20 0700)    norepinephrine Stopped (03/17/20 0332)     Lab Data: Relevant and available CV data reviewed    Plan:  ~NSTEMI/CAD/ICM/Acute systolic CHF  LHC with severe mid LAD disease, cx with luminal irregs and RCA an acute occlusion  RCA not revascularized at time of diagnostic [facility] at [facility]  TTE 40%  Plan          · No CABG due to recent inferior MI and neuro status, discussed with CTS  · If/when neuro status clarified/improved, PCI of LAD +/-RCA  ~Acute respiratory failure  Thought secondary to aspiration? Versus CHF?   Plan CC managing  ~Apical thrombus? Suggested by [address], no evidence by repeat echo with definity  ~Aortic valve vegetation? Suggested by [address], AV appears sclerotic without vegetation by repeat echo  ~Neuro status  Apparently altered at time of ER arrival, intubated ever since  Plan Neuro following   Recommend ativan gtt given excessive alcohol use and wean propofol if ok with neuro and CC   CC managing vent    Critical Care   Due to the high probability of clinically significant life threating deterioration of the patient's condition that required my urgent intervention, a total critical care time of >35 minutes was used. This time excludes any time that may have been spent performing procedures. This includes but not limited to vital sign monitoring, telemetry monitoring, continuous pulse oximety, IV medication, clinical response to the IV medications, documentation time , consultation time, interpretation of lab data, review of nursing notes and old record review.      All questions and concerns were addressed to the patient/family. Alternatives to my treatment were discussed. The note was completed using EMR.  Every effort was made to ensure accuracy; however, inadvertent computerized transcription errors may be present.

## 2020-03-18 NOTE — PROGRESS NOTES
Pulmonary & Critical Care Medicine ICU Progress Note      ASSESSMENT AND PLAN:     Acute Hypoxic Respiratory Failure   Mechanical Ventilation with Assist Control    He has apnea on Spontaneous Breathing Trial so unable to wean ventilator today   Try to wean sedation and use Lorazepam as needed  Acute Encephalopathy   No acute abnormalities on Brain MRI  [facility] is following hime  Upper Airway Swelling  · He did not have a leak when cuff deflated 3/17/20  · Will not start steroids now since not ready for extubation and glucose is elevated  Hypernatremia  · Improved with extra free water  Metabolic Alkalosis  · Improved with Acetazolamide  Diabetes Mellitus with Hyperglycemia   NPH 32 units every 8 hours - hold if glucose less than 150   Sliding Scale Insulin   Recent Acute NSTEMI   Management per Cardiology  Acute Systolic Heart Failure   Management per Cardiology        PROPHYLAXIS:   DVT Prophylaxis with Lovenox  GI Prophylaxis with Pepcid  Pneumonia Prophylaxis with Chlorhexidene    NUTRITION:   Tube feedings    DISPOSITION:   CVICU Patient        REASON FOR VISIT:  acute respiratory failure       UPDATE:   Intubated and on mechanical ventilation. He continues to have apnea on Spontaneous Breathing Trial.      CHIEF COMPLAINT:  Unable to obtain due to intubation and sedation. HISTORY:  Unable to obtain due to intubation and sedation. REVIEW OF SYMPTOMS:  Unable to obtain due to intubation and sedation.       MECHANICAL VENTILATION:  Intubated  Vent Mode: AC/VC Rate Set: 14 bmp/Vt Ordered: 450 mL/ /FiO2 : 40 %    ABG:   Recent Labs     03/17/20  0335 03/18/20  0425   PHART 7.454* 7.406   MCG0TDF 53.2* 45.6*   PO2ART 78.8 108.0         IV:   sodium chloride      fentaNYL (SUBLIMAZE) infusion Stopped (03/17/20 1726)    dextrose      propofol Stopped (03/18/20 1115)    norepinephrine Stopped (03/17/20 0332)       Intake/Output Summary (Last 24 hours) at 3/18/2020 1252  Last data filed at 3/18/2020 0700  Gross per 24 hour   Intake 2365.3 ml   Output 1170 ml   Net 1195.3 ml       MEDICATIONS:  Scheduled Meds:   folic acid, thiamine, multi-vitamin with vitamin K infusion   Intravenous Daily    insulin NPH  32 Units Subcutaneous Q8H    insulin lispro  0-18 Units Subcutaneous Q4H    chlorhexidine  15 mL Mouth/Throat BID    sodium chloride flush  10 mL Intravenous 2 times per day    enoxaparin  40 mg Subcutaneous Daily    famotidine (PEPCID) injection  20 mg Intravenous BID     PRN Meds:  potassium chloride, LORazepam, perflutren lipid microspheres, sodium chloride flush, acetaminophen **OR** acetaminophen, polyethylene glycol, promethazine **OR** ondansetron, magnesium sulfate, dextrose, dextrose      PHYSICAL EXAM:  Vital Signs: /68   Pulse 82   Temp 98.8 °F (37.1 °C) (Temporal)   Resp 16   Ht 5' 7.5\" (1.715 m)   Wt 163 lb 9.3 oz (74.2 kg)   SpO2 97%   BMI 25.24 kg/m²      Gen: Intubated and on mechanical ventilation. ENT:   Endotracheal tube is in place. No lip lesions. Resp:   No accessory muscle use. Clear lungs. CV:   PMI is normal. No murmur or gallop. GI:   Soft and not distended. No tenderness. Skin:  Warm and dry. No rash. Neuro:  Sedated. No tremor.       LAB RESULTS:  CBC:   Recent Labs     03/16/20 0410 03/17/20 0335 03/18/20 0420   WBC 7.9 8.7 10.4   HGB 14.0 15.3 14.9   HCT 41.4 46.0 45.0   MCV 91.0 90.4 91.9    280 271     CHEMISTRY:   Recent Labs     03/16/20 0410 03/17/20 0335 03/18/20  0420    148* 143   K 4.0 3.5 3.3*   CL 98* 100 100   CO2 33* 35* 30   BUN 15 21* 26*   CREATININE 0.8 1.0 1.0   GLUCOSE 401* 287* 282*     LIVER PROFILE:   Recent Labs     03/17/20 0335   AST 47*   ALT 53*   BILIDIR <0.2   BILITOT <0.2   ALKPHOS 76     ABG:   Recent Labs     03/17/20 0335 03/18/20  0425   PHART 7.454* 7.406   [id number] 53.2* 45.6*   PO2ART 78.8 108.0       GLUCOSE MANAGEMENT:  Results for Dejuan Rodriguez (MRN [id number]) as of 3/18/2020 13:03   Ref. Range 3/17/2020 16:31 3/17/2020 20:16 3/17/2020 23:52 3/18/2020 04:35 3/18/2020 08:03   POC Glucose Latest Ref Range: 70 - 99 mg/dl 173 (H) 153 (H) 225 (H) 217 (H) 229 (H)       CULTURES:  Sputum Culture 3/13/20:  Normal Respiratory Sowmya      CHEST XRAY PORTABLE:   Results for orders placed during the hospital encounter of 03/13/20   XR CHEST PORTABLE    Narrative EXAMINATION:  ONE XRAY VIEW OF THE CHEST    3/17/2020 5:22 am    COMPARISON:  Portable chest, 03/15/2020. HISTORY:  ORDERING SYSTEM PROVIDED HISTORY: Acute Respiratory Failure  TECHNOLOGIST PROVIDED HISTORY:  Reason for exam:->Acute Respiratory Failure  Reason for Exam: Acute Respiratory Failure  Acuity: Unknown  Type of Exam: Unknown    FINDINGS:  The cardiomediastinal contours are stable. Bibasilar airspace disease  persists, right greater than left. Blunting of the costophrenic angles is  compatible with small pleural effusions bilaterally, right greater than left. NG tube extends into the stomach. The side hole is near the GE junction and  this should be further advanced. Endotracheal tube appears in stable and  satisfactory position. There is a right upper extremity PICC line with the  tip near the atriocaval junction. Impression 1. NG tube tip is barely in the stomach and side hole is near the GE  junction. This should be further advanced. 2. Bibasilar infiltrates and bilateral pleural effusions are unchanged. BRAIN MRI SCAN 3/16/20:  1. Extensive patient motion limits evaluation. 2. No convincing acute intracranial abnormality or acute infarct within the   limitations of patient motion. 3. Mild global parenchymal volume loss with chronic microvascular ischemic   changes. 4. Scattered mucosal thickening of the paranasal sinuses with bilateral   mastoid effusions.         EEG 3/18/20:  No seizure activity  Diffuse slowing

## 2020-03-18 NOTE — PROGRESS NOTES
Janene Gregoriafara  Neurology Follow-up  [facility]    Date of Service: 3/18/2020    Subjective:   CC: Follow up today regarding:  Acute encephalopathy    Events noted. Chart and lab reviewed. The patient is still intubated. He is about the same. Off sedation. He can open his eyes to voice but not following directions. EEG showed no evidence of epileptiform discharges. Other review of system was limited.       ROS: limited due to intubation        Past Medical History:   Diagnosis Date    CAD (coronary artery disease)     [facility]     Diabetes mellitus ([facility] [address]     Hyperlipidemia     Hypertension      Current Facility-Administered Medications   Medication Dose Route Frequency Provider Last Rate Last Dose    potassium chloride 20 mEq/50 mL IVPB (Central Line)  20 mEq Intravenous PRN Charlee Pratt MD 50 mL/hr at 03/18/20 0648 20 mEq at 03/18/20 0648    sodium chloride 0.9 % infusion             sodium chloride 0.9 % 745 mL with folic acid 1 mg, adult multi-vitamin with vitamin k 10 mL, thiamine 300 mg   Intravenous Daily Frutoso MD Jose A 100 mL/hr at 03/18/20 1140      insulin NPH (HUMULIN N;NOVOLIN N) injection pen 32 Units  32 Units Subcutaneous Q8H Mikhail Lainez MD        LORazepam (ATIVAN) injection 1 mg  1 mg Intravenous Q30 Min PRN Linda Lopez MD   1 mg at 03/18/20 1139    insulin lispro (1 Unit Dial) 0-18 Units  0-18 Units Subcutaneous Q4H Linda Lopez MD   6 Units at 03/18/20 0804    chlorhexidine (PERIDEX) 0.12 % solution 15 mL  15 mL Mouth/Throat BID Linda Lopez MD   15 mL at 03/18/20 0805    perflutren lipid microspheres (DEFINITY) injection 1.65 mg  1.5 mL Intravenous ONCE PRN Lupe Baumann MD        sodium chloride flush 0.9 % injection 10 mL  10 mL Intravenous 2 times per day Charlee Pratt MD   10 mL at 03/18/20 0804    sodium chloride flush 0.9 % injection 10 mL  10 mL Intravenous PRN MD Raheel Winchester acetaminophen (TYLENOL) tablet 650 mg  650 mg Oral Q6H PRN Sylwia Baez MD        Or   Zakia Falun acetaminophen (TYLENOL) suppository 650 mg  650 mg Rectal Q6H PRN Sylwia Baez MD        polyethylene glycol (GLYCOLAX) packet 17 g  17 g Oral Daily PRN Sylwia Baez MD        promethazine (PHENERGAN) tablet 12.5 mg  12.5 mg Oral Q6H PRN Sylwia Baez MD        Or    ondansetron (ZOFRAN) injection 4 mg  4 mg Intravenous Q6H PRN Sylwia Baez MD        enoxaparin (LOVENOX) injection 40 mg  40 mg Subcutaneous Daily Sylwia Baez MD   40 mg at 03/18/20 0804    magnesium sulfate 2 g in 50 mL IVPB premix  2 g Intravenous PRN Sylwia Baez MD        fentaNYL (SUBLIMAZE) 1,000 mcg in sodium chloride 0.9 % 100 mL infusion  1 mcg/kg/hr Intravenous Continuous Josi Gomez MD   Stopped at 03/17/20 1726    dextrose 50 % IV solution  12.5 g Intravenous PRN Sylwia Baez MD        dextrose 5 % solution  100 mL/hr Intravenous PRN Sylwia Baez MD        propofol injection  10 mcg/kg/min Intravenous Titrated Jairo Tomlinson MD   Stopped at 03/18/20 1115    famotidine (PEPCID) injection 20 mg  20 mg Intravenous BID Jairo Tomlinson MD   20 mg at 03/18/20 0804    norepinephrine (LEVOPHED) 16 mg in dextrose 5% 250 mL infusion  2 mcg/min Intravenous Continuous Josi Gomez MD   Stopped at 03/17/20 7377     Allergies   Allergen Reactions    Lipitor [Atorvastatin]      family history includes Cancer in his mother. reports that he quit smoking about 25 years ago. He has never used smokeless tobacco. He reports current alcohol use of about 4.0 - 12.0 standard drinks of alcohol per week. He reports that he does not use drugs.          Objective:  Exam:  Constitutional:   Vitals:    03/18/20 0600 03/18/20 0630 03/18/20 0700 03/18/20 0730   BP: 136/75 (!) 143/79 126/70 130/68   Pulse: 78 80 80 82   Resp: 16 15 15 16   Temp:       TempSrc:       SpO2: 98% 97% 98% 97%   Weight:       Height:           General appearance: intubated  Eye: No icterus. Neck: supple  Cardiovascular:    No lower leg edema with good pulsation. Mental Status: intubated, opens eyes to painful stimulation. Cranial Nerves:   Pupil: RRR  No gaze preference  OCR: present  Corneal: no  Cough: no  Gag: yes  Spontaneous breathing: yes  II: Pupils: equal, round, reactive to light  III,IV,VI: No gaze preference. VII: Face is symmetric   CN from VIII to XII: NT due to intubation  Musculoskeletal: no motor response  Tone: normal  Reflexes: diminished but symmetric   Planters: mute  Coordination: sensation and Gait/Posture: NT due to intubation          Data:  LABS:   Lab Results   Component Value Date     03/18/2020    K 3.3 03/18/2020     03/18/2020    CO2 30 03/18/2020    BUN 26 03/18/2020    CREATININE 1.0 03/18/2020    GFRAA >60 03/18/2020    LABGLOM >60 03/18/2020    GLUCOSE 282 03/18/2020    MG 2.40 03/18/2020    CALCIUM 9.3 03/18/2020     Lab Results   Component Value Date    WBC 10.4 03/18/2020    RBC 4.90 03/18/2020    HGB 14.9 03/18/2020    HCT 45.0 03/18/2020    MCV 91.9 03/18/2020    RDW 13.2 03/18/2020     03/18/2020     Lab Results   Component Value Date    INR 1.14 03/13/2020    PROTIME 13.2 03/13/2020       Neuroimaging and labs were independently reviewed by me  Independently reviewed EEG tracing  Reviewed notes from different physicians. Impression: no change  Acute and persistent encephalopathy. Severe. So far no specific etiology. MRI brain reviewed and was limited by motion artifact. Would consider repeating MRI of the brain within few days depending on neuro status rule out hypoxic-ischemic encephalopathy versus ischemic strokes. Possible metabolic or toxic encephalopathy and cannot rule out hypertensive encephalopathy with initial blood pressure elevation on admission to [facility]. Acute respiratory failure with hypoxia.   CAD  Hypertension  Hyperlipidemia  CHF  Aspiration pneumonia  DM, not controlled. Recommendation    Prolonged EEG showed no epileptiform discharges  Continue current supportive care  Continue wean sedation  Respiratory support  Blood pressure monitor and control  Antibiotics  Insulin sliding scale  Prognosis remains guarded  We will follow        Trista Mclain MD   [phone]      This dictation was generated by voice recognition computer software. Although all attempts are made to edit the dictation for accuracy, there may be errors in the transcription that are not intended.

## 2020-03-18 NOTE — PROGRESS NOTES
Blood gas drawn 20 minutes after extubation with pt on 4 L of O2 via NC, O2 SAT stable, ABG WNL, Critical care MD notified of results, no new orders.     Pt's wife Torrey Marcos updated, v/u.

## 2020-03-18 NOTE — PROGRESS NOTES
Pt extubated with critical care MD in room, suctioned, placed on 4 L of O2 via NC. Tolerating well, O2 SAT stable,will drawn ABG in 15 minutes.

## 2020-03-19 LAB
ANION GAP SERPL CALCULATED.3IONS-SCNC: 13 MMOL/L (ref 3–16)
BASOPHILS ABSOLUTE: 0.1 K/UL (ref 0–0.2)
BASOPHILS RELATIVE PERCENT: 0.6 %
BUN BLDV-MCNC: 25 MG/DL (ref 7–20)
CALCIUM SERPL-MCNC: 9.2 MG/DL (ref 8.3–10.6)
CHLORIDE BLD-SCNC: 106 MMOL/L (ref 99–110)
CO2: 25 MMOL/L (ref 21–32)
CREAT SERPL-MCNC: 0.8 MG/DL (ref 0.8–1.3)
EOSINOPHILS ABSOLUTE: 0.4 K/UL (ref 0–0.6)
EOSINOPHILS RELATIVE PERCENT: 4.3 %
GFR AFRICAN AMERICAN: >60
GFR NON-AFRICAN AMERICAN: >60
GLUCOSE BLD-MCNC: 171 MG/DL (ref 70–99)
GLUCOSE BLD-MCNC: 188 MG/DL (ref 70–99)
GLUCOSE BLD-MCNC: 200 MG/DL (ref 70–99)
GLUCOSE BLD-MCNC: 214 MG/DL (ref 70–99)
GLUCOSE BLD-MCNC: 222 MG/DL (ref 70–99)
GLUCOSE BLD-MCNC: 225 MG/DL (ref 70–99)
GLUCOSE BLD-MCNC: 233 MG/DL (ref 70–99)
HCT VFR BLD CALC: 44.5 % (ref 40.5–52.5)
HEMOGLOBIN: 14.9 G/DL (ref 13.5–17.5)
LYMPHOCYTES ABSOLUTE: 1.3 K/UL (ref 1–5.1)
LYMPHOCYTES RELATIVE PERCENT: 12.8 %
MCH RBC QN AUTO: 30.5 PG (ref 26–34)
MCHC RBC AUTO-ENTMCNC: 33.5 G/DL (ref 31–36)
MCV RBC AUTO: 91.2 FL (ref 80–100)
MONOCYTES ABSOLUTE: 1.2 K/UL (ref 0–1.3)
MONOCYTES RELATIVE PERCENT: 12 %
NEUTROPHILS ABSOLUTE: 7.1 K/UL (ref 1.7–7.7)
NEUTROPHILS RELATIVE PERCENT: 70.3 %
PDW BLD-RTO: 13 % (ref 12.4–15.4)
PERFORMED ON: ABNORMAL
PLATELET # BLD: 257 K/UL (ref 135–450)
PMV BLD AUTO: 9.2 FL (ref 5–10.5)
POTASSIUM REFLEX MAGNESIUM: 3.7 MMOL/L (ref 3.5–5.1)
RBC # BLD: 4.89 M/UL (ref 4.2–5.9)
SODIUM BLD-SCNC: 144 MMOL/L (ref 136–145)
WBC # BLD: 10.1 K/UL (ref 4–11)

## 2020-03-19 PROCEDURE — 99232 SBSQ HOSP IP/OBS MODERATE 35: CPT | Performed by: PSYCHIATRY & NEUROLOGY

## 2020-03-19 PROCEDURE — 2000000000 HC ICU R&B

## 2020-03-19 PROCEDURE — 2580000003 HC RX 258: Performed by: INTERNAL MEDICINE

## 2020-03-19 PROCEDURE — 6360000002 HC RX W HCPCS: Performed by: INTERNAL MEDICINE

## 2020-03-19 PROCEDURE — 92610 EVALUATE SWALLOWING FUNCTION: CPT

## 2020-03-19 PROCEDURE — 2500000003 HC RX 250 WO HCPCS: Performed by: INTERNAL MEDICINE

## 2020-03-19 PROCEDURE — 99291 CRITICAL CARE FIRST HOUR: CPT | Performed by: INTERNAL MEDICINE

## 2020-03-19 PROCEDURE — 99232 SBSQ HOSP IP/OBS MODERATE 35: CPT | Performed by: INTERNAL MEDICINE

## 2020-03-19 PROCEDURE — 94760 N-INVAS EAR/PLS OXIMETRY 1: CPT

## 2020-03-19 PROCEDURE — 2700000000 HC OXYGEN THERAPY PER DAY

## 2020-03-19 PROCEDURE — 92526 ORAL FUNCTION THERAPY: CPT

## 2020-03-19 PROCEDURE — 85025 COMPLETE CBC W/AUTO DIFF WBC: CPT

## 2020-03-19 PROCEDURE — 80048 BASIC METABOLIC PNL TOTAL CA: CPT

## 2020-03-19 PROCEDURE — 92523 SPEECH SOUND LANG COMPREHEN: CPT

## 2020-03-19 RX ORDER — PHENOBARBITAL SODIUM 130 MG/ML
260 INJECTION INTRAMUSCULAR ONCE
Status: DISCONTINUED | OUTPATIENT
Start: 2020-03-19 | End: 2020-03-23

## 2020-03-19 RX ORDER — LORAZEPAM 2 MG/ML
1 INJECTION INTRAMUSCULAR EVERY 4 HOURS PRN
Status: DISCONTINUED | OUTPATIENT
Start: 2020-03-19 | End: 2020-03-24

## 2020-03-19 RX ADMIN — INSULIN LISPRO 6 UNITS: 100 INJECTION, SOLUTION INTRAVENOUS; SUBCUTANEOUS at 14:11

## 2020-03-19 RX ADMIN — INSULIN LISPRO 6 UNITS: 100 INJECTION, SOLUTION INTRAVENOUS; SUBCUTANEOUS at 08:30

## 2020-03-19 RX ADMIN — ENOXAPARIN SODIUM 40 MG: 40 INJECTION SUBCUTANEOUS at 09:01

## 2020-03-19 RX ADMIN — FOLIC ACID: 5 INJECTION, SOLUTION INTRAMUSCULAR; INTRAVENOUS; SUBCUTANEOUS at 08:39

## 2020-03-19 RX ADMIN — INSULIN LISPRO 6 UNITS: 100 INJECTION, SOLUTION INTRAVENOUS; SUBCUTANEOUS at 00:47

## 2020-03-19 RX ADMIN — Medication 10 ML: at 09:06

## 2020-03-19 RX ADMIN — INSULIN LISPRO 3 UNITS: 100 INJECTION, SOLUTION INTRAVENOUS; SUBCUTANEOUS at 20:06

## 2020-03-19 RX ADMIN — INSULIN LISPRO 6 UNITS: 100 INJECTION, SOLUTION INTRAVENOUS; SUBCUTANEOUS at 04:50

## 2020-03-19 RX ADMIN — Medication 10 ML: at 19:54

## 2020-03-19 RX ADMIN — INSULIN LISPRO 3 UNITS: 100 INJECTION, SOLUTION INTRAVENOUS; SUBCUTANEOUS at 16:47

## 2020-03-19 ASSESSMENT — PAIN SCALES - GENERAL
PAINLEVEL_OUTOF10: 0

## 2020-03-19 NOTE — PROGRESS NOTES
reduced strength, coordination, and ROM. Various textures were provided to assess swallowing function. Pt presents with moderate oropharyngeal dysphagia characterized by prolonged/reduced mastication, reduced bolus control, suspected premature bolus loss to pharynx, delayed swallow initiation, reduced laryngeal elevation upon manual palpation, and intermittent clinical signs of reduced pharyngeal clearing vs possible aspiration/penetration with thin liquids. Thin liquids via cup revealed suspected premature bolus loss to pharynx, delayed swallow initiation was noted with consistent immediate throat clearing and slight \"wet\" vocal quality. Improved bolus control was noted with nectar thick liquids with occasional throat clearing noted, suspect reduced pharyngeal clearing. Prolonged and disorganized mastication was noted with soft solids, minimal lingual residue was present post swallow. Pt declined a cracker. At this time, Pt is at risk for aspiration based on delayed swallow initiation, suspected reduced pharyngeal clearing, and suspected reduced airway protection. Recommend strict use of aspiration precautions. Dietary Recommendations:   1.) Dysphagia II Minced and Moist with Mildly Thick (Nectar) Liquids, no straws, meds crushed in puree   2.) If difficulty is noted, recommend downgrade to NPO with ongoing swallowing assessment     Strategies: 90 degree positioning with all p.o. intake; small bites/sips; alternate textures through meal; reduce rate of intake; No straws     Dysphagia Treatment/Goals: Speech therapy for dysphagia tx 3-5 times per week. ST.) Pt will tolerate recommended diet without s/s of aspiration   2.) If clinical symptoms of penetration/aspiration continue to be noted,Pt will tolerate MBS to r/o aspiration and determine appropriate diet/liquid level.    3.) Pt will tolerate diet advance to least restricted diet, as clinically indicated, with no signs of aspiration   4.) Pt will results and recommendations given to the Pt and nurse, who verbalized understanding    Timed Code Treatment: 0 minutes    Total Treatment Time: 40 minutes     If patient discharges prior to next session this note will serve as a discharge summary.      Anival Silva M.A., [address] [place] [address]  Speech-Language Pathologist

## 2020-03-19 NOTE — PLAN OF CARE
Problem: HEMODYNAMIC STATUS  Goal: Patient has stable vital signs and fluid balance  Outcome: Ongoing  Note: Pulse rate and rhythm, peripheral pulses, and capillary refill assessed every shift with assessment. General color and body temperature monitored throughout shift and with vitals. Assess for edema with head to toe assessment. Administer treatments and medications as ordered. Monitor patient's weight. Problem: Falls - Risk of:  Goal: Will remain free from falls  Description: Will remain free from falls  Outcome: Ongoing  Note: Pt remains free of falls. Fall risk protocol in place. Bed locked in lowest position. Call light in reach. Bed alarm engaged, side rails up 3/4. Room door left open, frequently monitoring pt. Will continue to monitor.

## 2020-03-19 NOTE — PROGRESS NOTES
is near the GE junction and  this should be further advanced. Endotracheal tube appears in stable and  satisfactory position. There is a right upper extremity PICC line with the  tip near the atriocaval junction. Impression 1. NG tube tip is barely in the stomach and side hole is near the GE  junction. This should be further advanced. 2. Bibasilar infiltrates and bilateral pleural effusions are unchanged. BRAIN MRI SCAN 3/16/20:  1. Extensive patient motion limits evaluation. 2. No convincing acute intracranial abnormality or acute infarct within the   limitations of patient motion. 3. Mild global parenchymal volume loss with chronic microvascular ischemic   changes. 4. Scattered mucosal thickening of the paranasal sinuses with bilateral   mastoid effusions.         EEG 3/18/20:  No seizure activity  Diffuse slowing

## 2020-03-19 NOTE — PLAN OF CARE
Awake with bouts of agitation  Moves all extremities  Daughter update   With plans for coronary angiogram in am and poss PCI  Per cardiology  Seen by pulm  Aware of agitation  Will give pheno sujatha as ordered  Labs noted  02 NC  Will monitor

## 2020-03-19 NOTE — PROGRESS NOTES
encephalopathy    DM (diabetes mellitus), type 2, uncontrolled with complications ([address]  Resolved Problems:    * No resolved hospital problems.  *       Assessment & Plan:   Acute hypoxic respiratory failure secondary to new onset acute systolic heart failure   -improved monitor respirator status  - speech eval today    Hypernatremia: improved monitor    Acute encephalopathy: MRI neg but limited  -mental status improving will monitor and discuss with neuro    NSTEMI:  3 vessel CAD, discuss with cards need for ac   - Echo-Left ventricle - normal in size and thickness, function is reduced with EF   of 40%  - PCI of LAD +/-RCA discuss plan with cardiology    DM2: withhyperglcemia  - SSI until speech eval    Hypokalemia:monitor    Metabolic alkalosis: improved      Diet: Diet NPO Effective Now  Code:Full Code  DVT PPXlovenox  Disposition  Pending neuro and cards plans      Renetta Felipe MD   3/19/2020 9:47 AM

## 2020-03-19 NOTE — PROGRESS NOTES
 ondansetron (ZOFRAN) injection 4 mg  4 mg Intravenous Q6H PRN Sylwia Baez MD        enoxaparin (LOVENOX) injection 40 mg  40 mg Subcutaneous Daily Sylwia Baez MD   40 mg at 03/19/20 0901    magnesium sulfate 2 g in 50 mL IVPB premix  2 g Intravenous PRN Sylwia Baez MD        dextrose 50 % IV solution  12.5 g Intravenous PRN Sylwia Baez MD        dextrose 5 % solution  100 mL/hr Intravenous PRN Sylwia Baez MD         Allergies   Allergen Reactions    Lipitor [Atorvastatin]       reports that he quit smoking about 25 years ago. He has never used smokeless tobacco. He reports current alcohol use of about 4.0 - 12.0 standard drinks of alcohol per week. He reports that he does not use drugs. Objective:  Exam:   Constitutional:   Vitals:    03/19/20 0630 03/19/20 0700 03/19/20 0800 03/19/20 1226   BP:   130/80    Pulse: 88 83     Resp: 18 10     Temp:   97 °F (36.1 °C)    TempSrc:   Temporal    SpO2:  96%  94%   Weight:       Height:         General appearance: More awake and alert today. No acute distress. Able to answer questions. Eye: No icterus. Neck: supple  Cardiovascular:  No lower leg edema with good pulsation. Mental Status:   AAO x2 today. Memory: Poor immediate recall. Poor attention span and concentration. Language: Soft but intact. Good fund of Knowledge. Cranial Nerves:   II: Visual fields: Full. Pupils: equal, round, reactive to light  III,IV,VI: Extra Ocular Movements are intact. No nystagmus  V: Facial sensation is intact  VII: Facial strength and movements: intact and symmetric  IX: Palate elevation is symmetric  XI: Shoulder shrug is intact  XII: Tongue movements are normal  Musculoskeletal: Generalized diffuse weakness 4/5 with poor effort. Tone: Normal tone. Reflexes: Bilateral biceps 2/4, triceps 2/4, brachial radialis 2/4, knee 2/4 and ankle 2/4. Planters: flexor bilaterally.   Coordination: No tremors or dysmetria  Sensation is normal  Gait not

## 2020-03-19 NOTE — PROGRESS NOTES
[address]  Daily Progress Note    Admit Date:  3/13/2020      CC:  CAD, respiratory failure, neuro status changes  Subj:  Neuro much improved today. Alert and oriented. Asking if can go home tomorrow. Obj:   /80   Pulse 83   Temp 97 °F (36.1 °C) (Temporal)   Resp 10   Ht 5' 7.5\" (1.715 m)   Wt 167 lb 1.7 oz (75.8 kg)   SpO2 96%   BMI 25.79 kg/m²       Intake/Output Summary (Last 24 hours) at 3/19/2020 0907  Last data filed at 3/19/2020 0630  Gross per 24 hour   Intake 500 ml   Output 970 ml   Net -470 ml     Gen Awake, alert. Heart  RRR, no MRG, nl apical impulse   Head NC, AT, no abnorm Abd  Soft, NT, +BS, no mass, no OM   Eyes PERRLA, conj/corn clear Ext  Ext nl, AT, no C/C/E   Nose Nares nl, no drain, NT Pulse 1+   Throat Lips, mucosa, tongue nl Skin Color/text/turg nl, no rash/lesions   Neck S/S, TM, NT, no bruit/JVD Psych Awake, alert, appropriate   Lung cta b Lymph   No cervical or axillary LA   Ch wall NT, no deform Neuro  No gross deficits     Medications:    folic acid, thiamine, multi-vitamin with vitamin K infusion   Intravenous Daily    insulin lispro  0-18 Units Subcutaneous Q4H    sodium chloride flush  10 mL Intravenous 2 times per day    enoxaparin  40 mg Subcutaneous Daily      dextrose 5 % and 0.45 % NaCl 50 mL/hr at 03/18/20 1943    dextrose      norepinephrine Stopped (03/17/20 0332)     Lab Data: Relevant and available CV data reviewed    Plan:  ~NSTEMI/CAD/ICM/Acute systolic CHF  [facility] with severe mid LAD disease, cx with luminal irregs and RCA an acute occlusion  RCA not revascularized at time of diagnostic LHC at [facility]  TTE 40%  Plan          · No CABG due to recent inferior MI and neuro status, discussed with CTS  · PCI of LAD +/-RCA in future after neuro normalizes - likely tomorrow AM  ~Acute respiratory failure  Thought secondary to aspiration? Versus CHF? Plan        CC managing  ~Apical thrombus?   Suggested by [facility], no evidence by repeat echo with

## 2020-03-19 NOTE — PROGRESS NOTES
Nurse called that he is becoming restless, agitated, and confused. I evaluated him at bedside and agree. He has a history of alcoholism.       DIAGNOSIS:  · Possible Alcohol Withdrawal    PLAN:  · Phenobarbital IM / IV protocol tonight  · Lorazepam 1 mg IV every 4 hours as needed for agitation  · Re-evaluate tomorrow

## 2020-03-20 LAB
ANION GAP SERPL CALCULATED.3IONS-SCNC: 14 MMOL/L (ref 3–16)
BASOPHILS ABSOLUTE: 0.1 K/UL (ref 0–0.2)
BASOPHILS RELATIVE PERCENT: 0.5 %
BUN BLDV-MCNC: 25 MG/DL (ref 7–20)
CALCIUM SERPL-MCNC: 9.4 MG/DL (ref 8.3–10.6)
CHLORIDE BLD-SCNC: 106 MMOL/L (ref 99–110)
CO2: 22 MMOL/L (ref 21–32)
CREAT SERPL-MCNC: 0.6 MG/DL (ref 0.8–1.3)
EOSINOPHILS ABSOLUTE: 0.5 K/UL (ref 0–0.6)
EOSINOPHILS RELATIVE PERCENT: 4.4 %
GFR AFRICAN AMERICAN: >60
GFR NON-AFRICAN AMERICAN: >60
GLUCOSE BLD-MCNC: 133 MG/DL (ref 70–99)
GLUCOSE BLD-MCNC: 179 MG/DL (ref 70–99)
GLUCOSE BLD-MCNC: 179 MG/DL (ref 70–99)
GLUCOSE BLD-MCNC: 192 MG/DL (ref 70–99)
GLUCOSE BLD-MCNC: 202 MG/DL (ref 70–99)
GLUCOSE BLD-MCNC: 204 MG/DL (ref 70–99)
GLUCOSE BLD-MCNC: 208 MG/DL (ref 70–99)
GLUCOSE BLD-MCNC: 220 MG/DL (ref 70–99)
HCT VFR BLD CALC: 45.5 % (ref 40.5–52.5)
HEMOGLOBIN: 15.3 G/DL (ref 13.5–17.5)
LYMPHOCYTES ABSOLUTE: 1.5 K/UL (ref 1–5.1)
LYMPHOCYTES RELATIVE PERCENT: 13.2 %
MCH RBC QN AUTO: 30.1 PG (ref 26–34)
MCHC RBC AUTO-ENTMCNC: 33.7 G/DL (ref 31–36)
MCV RBC AUTO: 89.3 FL (ref 80–100)
MONOCYTES ABSOLUTE: 1.2 K/UL (ref 0–1.3)
MONOCYTES RELATIVE PERCENT: 10.4 %
NEUTROPHILS ABSOLUTE: 8.2 K/UL (ref 1.7–7.7)
NEUTROPHILS RELATIVE PERCENT: 71.5 %
PDW BLD-RTO: 12.7 % (ref 12.4–15.4)
PERFORMED ON: ABNORMAL
PLATELET # BLD: 286 K/UL (ref 135–450)
PMV BLD AUTO: 8.8 FL (ref 5–10.5)
POC ACT LR: 217 SEC
POTASSIUM REFLEX MAGNESIUM: 3.7 MMOL/L (ref 3.5–5.1)
RBC # BLD: 5.1 M/UL (ref 4.2–5.9)
SODIUM BLD-SCNC: 142 MMOL/L (ref 136–145)
WBC # BLD: 11.5 K/UL (ref 4–11)

## 2020-03-20 PROCEDURE — 97530 THERAPEUTIC ACTIVITIES: CPT

## 2020-03-20 PROCEDURE — 6360000002 HC RX W HCPCS

## 2020-03-20 PROCEDURE — 6370000000 HC RX 637 (ALT 250 FOR IP): Performed by: INTERNAL MEDICINE

## 2020-03-20 PROCEDURE — 92978 ENDOLUMINL IVUS OCT C 1ST: CPT

## 2020-03-20 PROCEDURE — 99153 MOD SED SAME PHYS/QHP EA: CPT

## 2020-03-20 PROCEDURE — C1887 CATHETER, GUIDING: HCPCS

## 2020-03-20 PROCEDURE — C1760 CLOSURE DEV, VASC: HCPCS

## 2020-03-20 PROCEDURE — 6360000004 HC RX CONTRAST MEDICATION: Performed by: INTERNAL MEDICINE

## 2020-03-20 PROCEDURE — 6360000002 HC RX W HCPCS: Performed by: INTERNAL MEDICINE

## 2020-03-20 PROCEDURE — 85347 COAGULATION TIME ACTIVATED: CPT

## 2020-03-20 PROCEDURE — 99152 MOD SED SAME PHYS/QHP 5/>YRS: CPT

## 2020-03-20 PROCEDURE — 2580000003 HC RX 258: Performed by: INTERNAL MEDICINE

## 2020-03-20 PROCEDURE — 2580000003 HC RX 258

## 2020-03-20 PROCEDURE — 80048 BASIC METABOLIC PNL TOTAL CA: CPT

## 2020-03-20 PROCEDURE — 99231 SBSQ HOSP IP/OBS SF/LOW 25: CPT | Performed by: INTERNAL MEDICINE

## 2020-03-20 PROCEDURE — C1753 CATH, INTRAVAS ULTRASOUND: HCPCS

## 2020-03-20 PROCEDURE — 93454 CORONARY ARTERY ANGIO S&I: CPT | Performed by: INTERNAL MEDICINE

## 2020-03-20 PROCEDURE — B2111ZZ FLUOROSCOPY OF MULTIPLE CORONARY ARTERIES USING LOW OSMOLAR CONTRAST: ICD-10-PCS | Performed by: INTERNAL MEDICINE

## 2020-03-20 PROCEDURE — 94760 N-INVAS EAR/PLS OXIMETRY 1: CPT

## 2020-03-20 PROCEDURE — 97166 OT EVAL MOD COMPLEX 45 MIN: CPT

## 2020-03-20 PROCEDURE — B241ZZ3 ULTRASONOGRAPHY OF MULTIPLE CORONARY ARTERIES, INTRAVASCULAR: ICD-10-PCS | Performed by: INTERNAL MEDICINE

## 2020-03-20 PROCEDURE — C1894 INTRO/SHEATH, NON-LASER: HCPCS

## 2020-03-20 PROCEDURE — 97162 PT EVAL MOD COMPLEX 30 MIN: CPT

## 2020-03-20 PROCEDURE — 93454 CORONARY ARTERY ANGIO S&I: CPT

## 2020-03-20 PROCEDURE — 2709999900 HC NON-CHARGEABLE SUPPLY

## 2020-03-20 PROCEDURE — 92978 ENDOLUMINL IVUS OCT C 1ST: CPT | Performed by: INTERNAL MEDICINE

## 2020-03-20 PROCEDURE — 2500000003 HC RX 250 WO HCPCS

## 2020-03-20 PROCEDURE — 2000000000 HC ICU R&B

## 2020-03-20 PROCEDURE — 92526 ORAL FUNCTION THERAPY: CPT

## 2020-03-20 PROCEDURE — C1769 GUIDE WIRE: HCPCS

## 2020-03-20 PROCEDURE — 92979 ENDOLUMINL IVUS OCT C EA: CPT

## 2020-03-20 PROCEDURE — 85025 COMPLETE CBC W/AUTO DIFF WBC: CPT

## 2020-03-20 PROCEDURE — 97129 THER IVNTJ 1ST 15 MIN: CPT

## 2020-03-20 RX ORDER — CARVEDILOL 3.12 MG/1
3.12 TABLET ORAL 2 TIMES DAILY WITH MEALS
Status: DISCONTINUED | OUTPATIENT
Start: 2020-03-20 | End: 2020-03-24

## 2020-03-20 RX ORDER — ASPIRIN 325 MG
325 TABLET ORAL DAILY
Status: DISCONTINUED | OUTPATIENT
Start: 2020-03-20 | End: 2020-03-24

## 2020-03-20 RX ADMIN — Medication 10 ML: at 19:57

## 2020-03-20 RX ADMIN — Medication 10 ML: at 09:30

## 2020-03-20 RX ADMIN — INSULIN LISPRO 3 UNITS: 100 INJECTION, SOLUTION INTRAVENOUS; SUBCUTANEOUS at 05:17

## 2020-03-20 RX ADMIN — INSULIN LISPRO 6 UNITS: 100 INJECTION, SOLUTION INTRAVENOUS; SUBCUTANEOUS at 13:18

## 2020-03-20 RX ADMIN — ENOXAPARIN SODIUM 40 MG: 40 INJECTION SUBCUTANEOUS at 10:06

## 2020-03-20 RX ADMIN — ASPIRIN 325 MG ORAL TABLET 325 MG: 325 PILL ORAL at 09:42

## 2020-03-20 RX ADMIN — INSULIN GLARGINE 25 UNITS: 100 INJECTION, SOLUTION SUBCUTANEOUS at 11:00

## 2020-03-20 RX ADMIN — INSULIN LISPRO 3 UNITS: 100 INJECTION, SOLUTION INTRAVENOUS; SUBCUTANEOUS at 19:57

## 2020-03-20 RX ADMIN — CARVEDILOL 3.12 MG: 3.12 TABLET, FILM COATED ORAL at 16:45

## 2020-03-20 RX ADMIN — IOPAMIDOL 72 ML: 755 INJECTION, SOLUTION INTRAVENOUS at 09:20

## 2020-03-20 RX ADMIN — CARVEDILOL 3.12 MG: 3.12 TABLET, FILM COATED ORAL at 10:05

## 2020-03-20 RX ADMIN — INSULIN LISPRO 6 UNITS: 100 INJECTION, SOLUTION INTRAVENOUS; SUBCUTANEOUS at 09:49

## 2020-03-20 RX ADMIN — INSULIN LISPRO 3 UNITS: 100 INJECTION, SOLUTION INTRAVENOUS; SUBCUTANEOUS at 00:35

## 2020-03-20 ASSESSMENT — PAIN SCALES - GENERAL
PAINLEVEL_OUTOF10: 0

## 2020-03-20 NOTE — PROGRESS NOTES
Pulmonary & Critical Care Medicine ICU Progress Note      ASSESSMENT AND PLAN:     Acute Delirium - resolved on its own   He had acute delirium last night that resolved on its own      DISPOSITION:   Will sign off now        REASON FOR VISIT:  delirium      UPDATE:   His restlessness and agitation resolved on its own last night. He did not need Phenobarbital or Lorazepam.       CHIEF COMPLAINT:  weakness    HISTORY:  Generalized weakness. REVIEW OF SYMPTOMS:  Respiratory:  No dyspnea or cough. Cardiovascular:  No chest pain or palpitations. IV:   dextrose         Intake/Output Summary (Last 24 hours) at 3/20/2020 1221  Last data filed at 3/20/2020 0748  Gross per 24 hour   Intake 20 ml   Output 880 ml   Net -860 ml       MEDICATIONS:  Scheduled Meds:   aspirin  325 mg Oral Daily    carvedilol  3.125 mg Oral BID WC    insulin glargine  25 Units Subcutaneous Daily    PHENobarbital  260 mg Intramuscular Once    folic acid, thiamine, multi-vitamin with vitamin K infusion   Intravenous Daily    insulin lispro  0-18 Units Subcutaneous Q4H    sodium chloride flush  10 mL Intravenous 2 times per day    enoxaparin  40 mg Subcutaneous Daily     PRN Meds:  magnesium hydroxide, LORazepam, potassium chloride, sodium chloride flush, acetaminophen **OR** acetaminophen, polyethylene glycol, promethazine **OR** ondansetron, magnesium sulfate, dextrose, dextrose      PHYSICAL EXAM:  Vital Signs: BP (!) 143/84   Pulse 90   Temp 97 °F (36.1 °C) (Temporal)   Resp 20   Ht 5' 7.5\" (1.715 m)   Wt 165 lb 9.1 oz (75.1 kg)   SpO2 93%   BMI 25.55 kg/m²      Gen:   No distress. Breathing comfortably at rest.  Neuro:  Normal cranial nerves. Normal sensation. No tremor. Psych:  Awake and alert. Oriented. Normal mood.        LAB RESULTS:  CBC:   Recent Labs     03/18/20  0420 03/19/20  0240 03/20/20  0505   WBC 10.4 10.1 11.5*   HGB 14.9 14.9 15.3   HCT 45.0 44.5 45.5   MCV 91.9 91.2 89.3    257 286 CHEMISTRY:   Recent Labs     03/18/20  0420 03/19/20  0240 03/20/20  0505    144 142   K 3.3* 3.7 3.7    106 106   CO2 30 25 22   BUN 26* 25* 25*   CREATININE 1.0 0.8 0.6*   GLUCOSE 282* 214* 220*     LIVER PROFILE: No results for input(s): AST, ALT, LIPASE, BILIDIR, BILITOT, ALKPHOS in the last 72 hours. Invalid input(s):   AMYLASE,  ALB  ABG:   Recent Labs     03/18/20  0425 03/18/20  1611   PHART 7.406 7.386   [id number] 45.6* 44.2   PO2ART 108.0 108.0

## 2020-03-20 NOTE — PROGRESS NOTES
Occupational and Physical Therapy  Bob Meek    OT/PT referral received and appreciated. Patient's chart reviewed. Patient currently off of unit for cath lab. Will follow up later as schedule permits or next available date.     Electronically signed by DONYA Brown/L on 3/20/2020 at [address], [place], DPT, [id number]

## 2020-03-20 NOTE — PROGRESS NOTES
Speech  Language And Dysphagia Treatment Note    Name: Anisa Rey  : 1955  Medical Diagnosis: Congestive heart failure of unknown etiology ([facility] [address] [I50.9]  Treatment Diagnosis: Moderate Oropharyngeal Oropharyngeal Dysphagia, Mild Dysarthria, Cognitive-Linguistic Deficits   Pain: Pt did not report pain    Patient's response to therapy:  Pt awake, alert and more verbally responsive this date. Dysphagia Treatment:  Current Diet Level:   1.) Dysphagia II Minced and Moist with Mildly Thick (Nectar) Liquids, no straws, meds crushed in puree   2.) If difficulty is noted, recommend downgrade to NPO with ongoing swallowing assessment     Tolerance of Current Diet Level: Per chart review, no noted difficulty with current diet    Assessment of Texture Tolerance:  -Impressions: Pt was seen sitting upright in bed, he demonstrated improved speech production and overall alertness. Trials of thin liquids, nectar thick liquids, soft solids, and regular solids were provided. Thin liquids via cup revealed suspected premature bolus loss to pharynx, delayed swallow initiation was noted with intermittent immediate throat clearing and one instance of delayed coughing. Improved bolus control was noted with nectar thick liquids with no overt clinical s/s of aspiration/penetration assessed. Pt demonstrated improved bolus control and mastication with soft solids this date. Regular solids revealed use of large bolus amounts with prolonged mastication, Pt required use of liquid wash to assist with clearance. At this time, recommend advancement to Dysphagia III Soft and Bite-Sized with continuation of Mildly Thick (Nectar) Liquids. Recommend strict use of aspiration precautions.      Dysphagia Goals, addressed this date:  1.) Pt will tolerate recommended diet without s/s of aspiration (ongoing 3/20/2020)   2.) If clinical symptoms of penetration/aspiration continue to be noted,Pt will tolerate MBS to r/o aspiration and determine appropriate diet/liquid level. (ongoing 3/20/2020)   3.) Pt will tolerate diet advance to least restricted diet, as clinically indicated, with no signs of aspiration (ongoing 3/20/2020)   4.) Pt will improve oral motor function via bolus control exercises 5/5 (ongoing 3/20/2020)     Diet and Treatment Recommendations:  1.) Advance to Dysphagia III Soft and Bite-Sized  2.) Continue Mildly Thick (Nectar) Liquids, no straws, meds in puree  3.)  If difficulty is noted, recommend downgrade to NPO with ongoing swallowing assessment     Speech Language Treatment:  Impressions: Pt demonstrated improved alertness this date with mild difficulty recalling daily events. Pt with flat affect. Pt was oriented to self, facility, current month, and year. Pt with improved speech production this date with speech largely intelligible at the conversation level. Speech Language STG, addressed this date: 1. Pt will improve articulatory precision and speech intelligibility in connected speech via graded tasks to 80% (ongoing 3/20/2020)    -Speech largely intelligible at the conversation level   -Improved rate of speech with reduced dysarthric speech  2. Pt will improve auditory processing/comprehension of complex commands and questions to 80%, via graded tasks (ongoing 3/20/2020)    -Able to follow basic commands without difficulty   -Mildly delayed processing of more complex information  3. Pt will improve orientation and short term recall via graded tasks to 80% (ongoing 3/20/2020)    -Oriented to self, facility, current month, and year   -Disoriented to current date and day of week    -Pt recalled basic daily events with ~75% accuracy  4. .Pt will participate in ongoing assessment of cognitive-linguistic skills as indicated.  (ongoing 3/20/2020)    -Goal not directly addressed this session    Patient/Family Education:Education given to the Pt and nurse, who verbalized understanding    Assessment: Patient progressing toward goals    Plan: Continue as per plan of care    Discharge Recommendations: Pt will benefit from continued skilled Speech Therapy for Speech and Dysphagia services, prior to returning home. Treatment time  Timed Code Treatment Minutes: 10 minutes  Total Treatment time: 23 minutes    If patient discharges prior to next session this note will serve as a discharge summary.       Tiffany Aragon M.A., [address] [place] [address]  Speech-Language Pathologist

## 2020-03-20 NOTE — OP NOTE
[address]   Procedure Note      Procedure: Cors only, IVUS LM and ostial Cx  Indication: NSTEMI  Consent: Verbal and/or written consent obtained prior to procedure. Sedation: Minimal conscious sedation utilized for comfort.     Complic: None  EBL:  <29RW  Specimens: None  Fluoro:  3.5 min  Contrast: 72 cc  Access:  RCFA - perclosed    Findings:   LM  73% distal by IVUS  LAD  90% prox, 90% mid 95% mid  CX  OM1 - mid 50%, 50% ostial  RCA  100% mid with staining, L-R collaterals    LVG  Not performed  LVEDP  Not obtained    Intervention:   IVUS of LM and ostial Cx - LM - 73% stenosis, area 5.7    Post Cath Dx:   Severe MVD - refer for CABG  No statin due to allergy  Continue asa

## 2020-03-20 NOTE — PROGRESS NOTES
Patient brought over to CVU from cath lab and attached to CVU monitoring. Report reviewed with cath lab RN. Right groin soft and no hematoma or oozing noted. S/P perclose R groin Occlusive dressing dry and intact. Pedal pulses easily palpated.   Primary RN ninoska

## 2020-03-20 NOTE — PROGRESS NOTES
26-50%  · Oral Nutrition Supplement (ONS) Orders: None  · ONS intake: NPO  · Anthropometric Measures:  · Ht: 5' 7.5\" (171.5 cm)   · Current Body Wt: 165 lb (74.8 kg)  · Ideal Body Wt: 151 lb (68.5 kg), % Ideal Body    · BMI Classification: BMI 25.0 - 29.9 Overweight    Nutrition Interventions:   Continue current diet, Start ONS  Continued Inpatient Monitoring, Speech Therapy    Nutrition Evaluation:   · Evaluation: Goals set   · Goals: PO 50% or more at meals/supp    · Monitoring: Meal Intake, Supplement Intake, Weight, I&O, Chewing/Swallowing      Electronically signed by Alexandra Cockayne, RD, CNSC, LD on 3/20/20 at 10:26 AM EDT    Contact Number: [phone]

## 2020-03-20 NOTE — PROGRESS NOTES
Occupational Therapy                              Occupational Therapy Initial Assessment/ Treatment    Date: 3/20/2020   Patient Name: Diamond Stewart  MRN: [id number]     : 1955    Date of Service: 3/20/2020    Discharge Recommendations:    Diamond Stewart scored a 16/24 on the AM-PAC ADL Inpatient form. Current research shows that an AM-PAC score of 17 or less is typically not associated with a discharge to the patient's home setting. Based on the patients AM-PAC score and their current ADL deficits, it is recommended that the patient have 5-7 sessions per week of Occupational Therapy at d/c to increase the patients independence. If patient discharges prior to next session this note will serve as a discharge summary. Please see below for the latest assessment towards goals. CTA the need for additional therapy at discharge. Per chart review, patient with planned CABG next week. OT Equipment Recommendations  Other: CTA    Assessment   Performance deficits / Impairments: Decreased functional mobility ; Decreased strength;Decreased endurance;Decreased high-level IADLs;Decreased ADL status; Decreased safe awareness;Decreased cognition;Decreased balance  Assessment: Patient is a 58yr old male admitted for SOB requiring intubation for 8 days. Patient's PLOF is independent with all ADLs. Patient currently requires CGA- min for transfers and mobility. Patient would benefit from ongoing OT in order to increase functional status   Prognosis: Good  Decision Making: Medium Complexity  OT Education: OT Role;ADL Adaptive Strategies; Plan of Care;Transfer Training;Precautions; Energy Conservation;Orientation  Patient Education: patient verbalized understanding   Barriers to Learning: cognition   REQUIRES OT FOLLOW UP: Yes  Activity Tolerance  Activity Tolerance: Patient limited by fatigue  Safety Devices  Safety Devices in place: Yes  Type of devices: Left in chair;Chair alarm in place;Call light within reach; All fall risk precautions in place;Nurse notified           Patient Diagnosis(es): There were no encounter diagnoses. has a past medical history of CAD (coronary artery disease), CHF (congestive heart failure) ([facility] [address] Diabetes mellitus ([facility] [address] Hyperlipidemia, and Hypertension. has a past surgical history that includes Cardiac surgery and Colonoscopy. Restrictions  Restrictions/Precautions  Restrictions/Precautions: Fall Risk(high)  Required Braces or Orthoses?: No  Position Activity Restriction  Other position/activity restrictions: Rossy Stringer is 60 y.o. male who presented with complaint of chest pain. Symptom onset was acute for a time period of 1 week. The severity is described as moderate. The course of his symptoms over time is worsening. The symptoms improved with rest and worsened with exertion. The patient's symptom is associated with SOB.     Subjective   General  Chart Reviewed: Yes, Imaging, History and Physical, Progress Notes, Previous Admission  Family / Caregiver Present: No  Diagnosis: SOB  Subjective  Subjective: Patient pleasant and agreeable to OT evaluation   General Comment  Comments: RN okay for therapy; bed rest orders lifted following cath procedure  Patient Currently in Pain: Denies  Vital Signs  Temp: 97 °F (36.1 °C)  Temp Source: Temporal  Pulse: 76  Heart Rate Source: Monitor  Resp: 18  Level of Consciousness: Alert  Patient Currently in Pain: Denies  Social/Functional History  Social/Functional History  Lives With: Spouse  Type of Home: House  Home Layout: One level, Performs ADL's on one level, Able to Live on Main level with bedroom/bathroom  Home Access: Stairs to enter without rails  Entrance Stairs - Number of Steps: 1 ROMERO  Bathroom Shower/Tub: Walk-in shower  Bathroom Toilet: Handicap height  Bathroom Equipment: Grab bars in shower  Home Equipment: (None)  ADL Assistance: Independent  Homemaking Assistance: Independent  Homemaking Responsibilities: Safety Education & Training, Balance Training      AM-PAC Score        AM-PAC Inpatient Daily Activity Raw Score: 16 (03/20/20 1702)  AM-PAC Inpatient ADL T-Scale Score : 35.96 (03/20/20 1702)  ADL Inpatient CMS 0-100% Score: 53.32 (03/20/20 1702)  ADL Inpatient CMS G-Code Modifier : CK (03/20/20 1702)    Goals  Short term goals  Time Frame for Short term goals: by discharge   Short term goal 1: Complete UB ADLs with set-up assistance  Short term goal 2: Complete LB ADLs with min assistance  Short term goal 3: Complete functional transfers with SBA  Short term goal 4: Complete functional mobility with LRAD at SBA        Therapy Time   Individual Concurrent Group Co-treatment   Time In 1300         Time Out 1338         Minutes 38         Timed Code Treatment Minutes: 23 Minutes(15 minute evaluation )       DONYA Kilpatrick/ANA

## 2020-03-20 NOTE — PRE SEDATION
Brief Pre-Op Note/Sedation Assessment      Bob Meek  1955  [id number]      [id number]  7:39 AM    Planned Procedure: Cardiac Catheterization Procedure    Post Procedure Plan: Return to same level of care    Consent: I have discussed with the patient and/or the patient representative the indication, alternatives, and the possible risks and/or complications of the planned procedure and the anesthesia methods. The patient and/or patient representative appear to understand and agree to proceed. Chief Complaint: Chest Pain/Pressure  Anginal Equivalent  NSTEMI  Dyspnea on Exertion  Dyspnea      Indications for Cath Procedure:  ACS > 24 hrs, New Onset Angina <= 2 months and Worsening Angina  Anginal Classification within 2 weeks:  CCS IV - Inability to perform any activity without angina or angina at rest, i.e., severe limitation  NYHA Heart Failure Class within 2 weeks: No symptoms  Is Cath Lab Visit Valve-related?: No    Anti- Anginal Meds within 2 weeks:   Yes: Aspirin    Stress or Imaging Studies Performed:  None     Vital Signs:  /83   Pulse 88   Temp 98.5 °F (36.9 °C) (Temporal)   Resp 26   Ht 5' 7.5\" (1.715 m)   Wt 165 lb 9.1 oz (75.1 kg)   SpO2 92%   BMI 25.55 kg/m²     Allergies:   Allergies   Allergen Reactions    Lipitor [Atorvastatin]        Past Medical History:  Past Medical History:   Diagnosis Date    CAD (coronary artery disease)     [facility] failure) [facility] [address]     [facility] mellitus ([facility]     Hyperlipidemia     Hypertension          Surgical History:  Past Surgical History:   Procedure Laterality Date    CARDIAC SURGERY      angiogram 3/2020    COLONOSCOPY           Medications:  Current Facility-Administered Medications   Medication Dose Route Frequency Provider Last Rate Last Dose    PHENobarbital (LUMINAL) injection 260 mg  260 mg Intramuscular Once Mihaela Lam MD        LORazepam (ATIVAN) injection 1 mg  1 mg Intravenous Q4H PRN Rajesh Ceja command  Respiration:  2 - Able to breathe deeply and cough freely  Circulation:  2 - BP+/- 20mmHg of normal  Consciousness:  2 - Fully awake  Oxygen Saturation (color):  2 - Able to maintain oxygen saturation >92% on room air    Sedation/Anesthesia Plan:  Guard the patient's safety and welfare. Minimize physical discomfort and pain. Minimize negative psychological responses to treatment by providing sedation and analgesia and maximize the potential amnesia. Patient to meet pre-procedure discharge plan.     Medication Planned:  midazolam intravenously and fentanyl intravenously    Patient is an appropriate candidate for plan of sedation: yes      Electronically signed by Raciel Wilcox MD on 3/20/2020 at 7:39 AM

## 2020-03-20 NOTE — PLAN OF CARE
Problem: HEMODYNAMIC STATUS  Goal: Patient has stable vital signs and fluid balance  Outcome: Ongoing  Note: Pulse rate and rhythm, peripheral pulses, and capillary refill assessed every shift with assessment. General color and body temperature monitored throughout shift and with vitals. Assess for edema with head to toe assessment. Administer treatments and medications as ordered. Monitor patient's weight. Problem: Falls - Risk of:  Goal: Will remain free from falls  Description: Will remain free from falls  3/19/2020 2025 by Bentley Alfaro RN  Outcome: Ongoing  Note: Pt remains free of falls. Fall risk protocol in place. Bed locked in lowest position. Call light in reach. Side rails up 3/4, bed alarm engaged. Room door left open. Pt instructed to call for assistance, verbalizes understanding. Will continue to monitor. Problem: Pain Control  Goal: Maintain pain level at or below patient's acceptable level (or 5 if patient is unable to determine acceptable level)  3/19/2020 2025 by Bentley Alfaro RN  Outcome: Ongoing  Note: Pt is alert and oriented and able to answer questions appropriately. When asked if in pain and educated on pain rating scale, pt verbalizes understanding and denies pain presence. Will continue to assess for pain and treat accordingly.

## 2020-03-20 NOTE — PROGRESS NOTES
Patient brought over to CVU from cath lab and attached to CVU monitoring. Report reviewed with cath lab RN. R groin soft and no hematoma or oozing noted. Occlusive dressing dry and intact. Cath results noted. Pendng pre op cvts. Pedal pulses easily palpated.   Primary RN ninoska

## 2020-03-20 NOTE — PROGRESS NOTES
[facility]    3/20/2020 10:03 AM        Name: Tangela Tinajero . Admitted: 3/13/2020  Primary Care Provider: No primary care provider on file.  (Tel: None)      Subjective:    Underwent left cardiac cath with multivessel disease currently patient resting no chest pain or shortness of breath  Reviewed interval ancillary notes    Current Medications  aspirin tablet 325 mg, Daily  magnesium hydroxide (MILK OF MAGNESIA) 400 MG/5ML suspension 30 mL, Daily PRN  carvedilol (COREG) tablet 3.125 mg, BID WC  insulin glargine (LANTUS;BASAGLAR) injection pen 25 Units, Daily  PHENobarbital (LUMINAL) injection 260 mg, Once  LORazepam (ATIVAN) injection 1 mg, Q4H PRN  potassium chloride 20 mEq/50 mL IVPB (Central Line), PRN  sodium chloride 0.9 % 788 mL with folic acid 1 mg, adult multi-vitamin with vitamin k 10 mL, thiamine 300 mg, Daily  insulin lispro (1 Unit Dial) 0-18 Units, Q4H  sodium chloride flush 0.9 % injection 10 mL, 2 times per day  sodium chloride flush 0.9 % injection 10 mL, PRN  acetaminophen (TYLENOL) tablet 650 mg, Q6H PRN    Or  acetaminophen (TYLENOL) suppository 650 mg, Q6H PRN  polyethylene glycol (GLYCOLAX) packet 17 g, Daily PRN  promethazine (PHENERGAN) tablet 12.5 mg, Q6H PRN    Or  ondansetron (ZOFRAN) injection 4 mg, Q6H PRN  enoxaparin (LOVENOX) injection 40 mg, Daily  magnesium sulfate 2 g in 50 mL IVPB premix, PRN  dextrose 50 % IV solution, PRN  dextrose 5 % solution, PRN        Objective:  BP (!) 143/84   Pulse 90   Temp 97.1 °F (36.2 °C) (Temporal)   Resp 20   Ht 5' 7.5\" (1.715 m)   Wt 165 lb 9.1 oz (75.1 kg)   SpO2 93%   BMI 25.55 kg/m²     Intake/Output Summary (Last 24 hours) at 3/20/2020 1003  Last data filed at 3/20/2020 0748  Gross per 24 hour   Intake 20 ml   Output 1080 ml   Net -1060 ml      Wt Readings from Last 3 Encounters:   03/20/20 165 lb 9.1 oz (75.1 kg) General appearance:  AAOx3  HEENT: atraumatic, Pupils equal, muscous membranes moist, no masses appreciated  Cardiovascular: Regular rate and rhythm no murmurs cap refill < 2sec   Respiratory: mild crackles  Gastrointestinal: Abdomen soft, non-tender, BS+  EXT:no edema  Neurology:following commands  Psychiatry: calm  Skin: Warm, dry, no rashes appreciated    Labs and Tests:  CBC:   Recent Labs     03/18/20  0420 03/19/20  0240 03/20/20  0505   WBC 10.4 10.1 11.5*   HGB 14.9 14.9 15.3    257 286     BMP:    Recent Labs     03/18/20  0420 03/19/20  0240 03/20/20  0505    144 142   K 3.3* 3.7 3.7    106 106   CO2 30 25 22   BUN 26* 25* 25*   CREATININE 1.0 0.8 0.6*   GLUCOSE 282* 214* 220*     Hepatic:   No results for input(s): AST, ALT, ALB, BILITOT, ALKPHOS in the last 72 hours. XR CHEST PORTABLE   Final Result   1. NG tube tip is barely in the stomach and side hole is near the GE   junction. This should be further advanced. 2. Bibasilar infiltrates and bilateral pleural effusions are unchanged. MRI BRAIN WO CONTRAST   Final Result   1. Extensive patient motion limits evaluation. 2. No convincing acute intracranial abnormality or acute infarct within the   limitations of patient motion. 3. Mild global parenchymal volume loss with chronic microvascular ischemic   changes. 4. Scattered mucosal thickening of the paranasal sinuses with bilateral   mastoid effusions. XR CHEST PORTABLE   Final Result   No significant interval change. XR CHEST PORTABLE   Final Result   1. Radiographic findings most suggestive of pulmonary edema/CHF. 2. Recommend advancing enteric tube by 5 cm. 3. Additional support devices as described above.              Recent imaging reviewed    Problem List  Principal Problem:    NSTEMI (non-ST elevated myocardial infarction) [facility])  Active Problems:    Congestive heart failure of unknown etiology ([facility] [address]    3-vessel coronary artery disease

## 2020-03-20 NOTE — PROGRESS NOTES
Physical Therapy    Facility/Department: [facility] CVU  Initial Assessment    NAME: Paige Joy  : 1955  MRN: [id number]    Date of Service: 3/20/2020    Discharge Recommendations:Mart Donald scored a 16/24 on the AM-PAC short mobility form. Current research shows that an AM-PAC score of 17 or less is typically not associated with a discharge to the patient's home setting. Based on the patients AM-PAC score and their current functional mobility deficits, it is recommended that the patient have 5-7 sessions per week of Physical Therapy at d/c to increase the patients independence. If patient discharges prior to next session this note will serve as a discharge summary. Please see below for the latest assessment towards goals. PT Equipment Recommendations  Equipment Needed: No    Assessment   Body structures, Functions, Activity limitations: Decreased functional mobility ; Decreased ADL status; Decreased endurance;Decreased strength;Decreased balance; Increased pain  Assessment: Pt presents as below his baseline function, and would benefit from skilled PT services to promote safe return to PLOF. Prognosis: Good  Decision Making: Medium Complexity  PT Education: Goals;PT Role;Plan of Care  Patient Education: D/C recommendations  REQUIRES PT FOLLOW UP: Yes  Activity Tolerance  Activity Tolerance: Patient limited by endurance       Patient Diagnosis(es): There were no encounter diagnoses. has a past medical history of CAD (coronary artery disease), CHF (congestive heart failure) ([address] Diabetes mellitus ([address] Hyperlipidemia, and Hypertension. has a past surgical history that includes Cardiac surgery and Colonoscopy. Restrictions  Restrictions/Precautions  Restrictions/Precautions: Fall Risk(High)  Required Braces or Orthoses?: No  Position Activity Restriction  Other position/activity restrictions: Paige Joy is 60 y.o. male who presented with complaint of chest pain.  Symptom onset was acute for a time period of 1 week. The severity is described as moderate. The course of his symptoms over time is worsening. The symptoms improved with rest and worsened with exertion. The patient's symptom is associated with SOB.   Vision/Hearing  Vision: Within Functional Limits  Hearing: Within functional limits     Subjective  General  Chart Reviewed: Yes  Response To Previous Treatment: Not applicable  Family / Caregiver Present: No  Diagnosis: Congestive Heart Failure  Follows Commands: Within Functional Limits  General Comment  Comments: Pt supine in bed upon arrival.  Subjective  Subjective: Pt agreeable to PT/OT eval.  Pain Screening  Patient Currently in Pain: Denies  Vital Signs  Patient Currently in Pain: Denies       Orientation  Orientation  Overall Orientation Status: Within Functional Limits  Social/Functional History  Social/Functional History  Lives With: Spouse  Type of Home: House  Home Layout: One level, Performs ADL's on one level, Able to Live on Main level with bedroom/bathroom  Home Access: Stairs to enter without rails  Entrance Stairs - Number of Steps: 1 ROMERO  Bathroom Shower/Tub: Walk-in shower  Bathroom Toilet: Handicap height  Bathroom Equipment: Grab bars in shower  Home Equipment: (None)  ADL Assistance: Independent  Homemaking Assistance: Independent  Homemaking Responsibilities: Yes  Ambulation Assistance: Independent  Transfer Assistance: Independent  Active : Yes  Mode of Transportation: Truck  Occupation: Full time employment  Additional Comments: Pt denies recent falls  Cognition        Objective     Observation/Palpation  Posture: Good    AROM RLE (degrees)  RLE AROM: WFL  AROM LLE (degrees)  LLE AROM : WFL  Strength RLE  Strength RLE: WFL  Strength LLE  Strength LLE: WFL  Tone RLE  RLE Tone: Normotonic  Tone LLE  LLE Tone: Normotonic  Motor Control  Gross Motor?: WFL  Sensation  Overall Sensation Status: WFL  Bed mobility  Supine to Sit: Stand by assistance(HOB elevated)  Scooting: Stand by assistance  Transfers  Sit to Stand: Contact guard assistance  Stand to sit: Contact guard assistance  Stand Pivot Transfers: Contact guard assistance  Ambulation  Ambulation?: No  Stairs/Curb  Stairs?: No     Balance  Posture: Good  Sitting - Static: Good  Sitting - Dynamic: Good  Standing - Static: Fair;+  Standing - Dynamic: [address]  Times per week: 3-5x  Times per day: Daily  Current Treatment Recommendations: Strengthening, ROM, Balance Training, Functional Mobility Training, Transfer Training, Endurance Training, Gait Training, Safety Education & Training, Patient/Caregiver Education & Training  Safety Devices  Type of devices: All fall risk precautions in place, Call light within reach, Chair alarm in place, Gait belt, Patient at risk for falls, Left in chair, Nurse notified  Restraints  Initially in place: No    G-Code       OutComes Score                                                  AM-PAC Score  AM-PAC Inpatient Mobility Raw Score : 16 (03/20/20 1542)  AM-PAC Inpatient T-Scale Score : 40.78 (03/20/20 1542)  Mobility Inpatient CMS 0-100% Score: 54.16 (03/20/20 1542)  Mobility Inpatient CMS G-Code Modifier : CK (03/20/20 1542)          Goals  Short term goals  Time Frame for Short term goals:  To be met prior to discharge  Short term goal 1: Pt will complete bed mobility with mod I  Short term goal 2: Pt will complete sit to/from stand with supervision  Short term goal 3: Pt will ambulate 100 ft with LRAD and supervision       Therapy Time   Individual Concurrent Group Co-treatment   Time In 1300         Time Out 1330         Minutes 30         Timed Code Treatment Minutes: 13 Minutes     Binu Ortiz PT   Binu Ortiz PT, DPT, [id number]

## 2020-03-20 NOTE — CARE COORDINATION
Discharge Planning Assessment  RN/SW discharge planner met with patient/ (and family member) to discuss reason for admission, current living situation, and potential needs at the time of discharge    Patient remains disoriented. Values for DCPA completed via Epic and family input. Demographics/Insurance verified [place] (Commercial)     Current type of dwelling: house    Patient from ECF/SW confirmed with: n/a    Living arrangements: w/spouse ([phone]) and an adult grand-daughter. 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 [facility] list emailed to daughter    Discussed with patient and/or family that on the day of discharge home tentative time of discharge will be between 10 AM and noon.      Transportation at the time of discharge: spouse    CALVIN StarkN, RN   [phone]

## 2020-03-21 LAB
ANION GAP SERPL CALCULATED.3IONS-SCNC: 11 MMOL/L (ref 3–16)
BASOPHILS ABSOLUTE: 0.1 K/UL (ref 0–0.2)
BASOPHILS RELATIVE PERCENT: 0.5 %
BUN BLDV-MCNC: 26 MG/DL (ref 7–20)
CALCIUM SERPL-MCNC: 9.1 MG/DL (ref 8.3–10.6)
CHLORIDE BLD-SCNC: 107 MMOL/L (ref 99–110)
CO2: 24 MMOL/L (ref 21–32)
CREAT SERPL-MCNC: 0.6 MG/DL (ref 0.8–1.3)
EOSINOPHILS ABSOLUTE: 0.6 K/UL (ref 0–0.6)
EOSINOPHILS RELATIVE PERCENT: 4.7 %
GFR AFRICAN AMERICAN: >60
GFR NON-AFRICAN AMERICAN: >60
GLUCOSE BLD-MCNC: 127 MG/DL (ref 70–99)
GLUCOSE BLD-MCNC: 142 MG/DL (ref 70–99)
GLUCOSE BLD-MCNC: 152 MG/DL (ref 70–99)
GLUCOSE BLD-MCNC: 154 MG/DL (ref 70–99)
GLUCOSE BLD-MCNC: 192 MG/DL (ref 70–99)
GLUCOSE BLD-MCNC: 202 MG/DL (ref 70–99)
GLUCOSE BLD-MCNC: 224 MG/DL (ref 70–99)
HCT VFR BLD CALC: 46.1 % (ref 40.5–52.5)
HEMOGLOBIN: 15.4 G/DL (ref 13.5–17.5)
LYMPHOCYTES ABSOLUTE: 1.9 K/UL (ref 1–5.1)
LYMPHOCYTES RELATIVE PERCENT: 15.6 %
MAGNESIUM: 2.5 MG/DL (ref 1.8–2.4)
MCH RBC QN AUTO: 29.6 PG (ref 26–34)
MCHC RBC AUTO-ENTMCNC: 33.3 G/DL (ref 31–36)
MCV RBC AUTO: 88.9 FL (ref 80–100)
MONOCYTES ABSOLUTE: 1.3 K/UL (ref 0–1.3)
MONOCYTES RELATIVE PERCENT: 10.7 %
NEUTROPHILS ABSOLUTE: 8.3 K/UL (ref 1.7–7.7)
NEUTROPHILS RELATIVE PERCENT: 68.5 %
PDW BLD-RTO: 12.9 % (ref 12.4–15.4)
PERFORMED ON: ABNORMAL
PLATELET # BLD: 288 K/UL (ref 135–450)
PMV BLD AUTO: 9.4 FL (ref 5–10.5)
POTASSIUM REFLEX MAGNESIUM: 3.4 MMOL/L (ref 3.5–5.1)
POTASSIUM SERPL-SCNC: 4.2 MMOL/L (ref 3.5–5.1)
RBC # BLD: 5.19 M/UL (ref 4.2–5.9)
SODIUM BLD-SCNC: 142 MMOL/L (ref 136–145)
WBC # BLD: 12.1 K/UL (ref 4–11)

## 2020-03-21 PROCEDURE — 6370000000 HC RX 637 (ALT 250 FOR IP): Performed by: INTERNAL MEDICINE

## 2020-03-21 PROCEDURE — 85025 COMPLETE CBC W/AUTO DIFF WBC: CPT

## 2020-03-21 PROCEDURE — 2000000000 HC ICU R&B

## 2020-03-21 PROCEDURE — 80048 BASIC METABOLIC PNL TOTAL CA: CPT

## 2020-03-21 PROCEDURE — 94760 N-INVAS EAR/PLS OXIMETRY 1: CPT

## 2020-03-21 PROCEDURE — 2580000003 HC RX 258: Performed by: INTERNAL MEDICINE

## 2020-03-21 PROCEDURE — 6360000002 HC RX W HCPCS: Performed by: INTERNAL MEDICINE

## 2020-03-21 PROCEDURE — 83735 ASSAY OF MAGNESIUM: CPT

## 2020-03-21 PROCEDURE — 99233 SBSQ HOSP IP/OBS HIGH 50: CPT | Performed by: INTERNAL MEDICINE

## 2020-03-21 PROCEDURE — 84132 ASSAY OF SERUM POTASSIUM: CPT

## 2020-03-21 PROCEDURE — 94761 N-INVAS EAR/PLS OXIMETRY MLT: CPT

## 2020-03-21 RX ORDER — FUROSEMIDE 40 MG/1
40 TABLET ORAL DAILY
Status: DISCONTINUED | OUTPATIENT
Start: 2020-03-21 | End: 2020-03-23

## 2020-03-21 RX ADMIN — INSULIN LISPRO 6 UNITS: 100 INJECTION, SOLUTION INTRAVENOUS; SUBCUTANEOUS at 16:35

## 2020-03-21 RX ADMIN — FUROSEMIDE 40 MG: 40 TABLET ORAL at 10:27

## 2020-03-21 RX ADMIN — Medication 10 ML: at 08:14

## 2020-03-21 RX ADMIN — CARVEDILOL 3.12 MG: 3.12 TABLET, FILM COATED ORAL at 17:12

## 2020-03-21 RX ADMIN — Medication 10 ML: at 20:24

## 2020-03-21 RX ADMIN — INSULIN GLARGINE 25 UNITS: 100 INJECTION, SOLUTION SUBCUTANEOUS at 08:14

## 2020-03-21 RX ADMIN — ASPIRIN 325 MG ORAL TABLET 325 MG: 325 PILL ORAL at 08:14

## 2020-03-21 RX ADMIN — INSULIN LISPRO 6 UNITS: 100 INJECTION, SOLUTION INTRAVENOUS; SUBCUTANEOUS at 20:23

## 2020-03-21 RX ADMIN — INSULIN LISPRO 3 UNITS: 100 INJECTION, SOLUTION INTRAVENOUS; SUBCUTANEOUS at 00:00

## 2020-03-21 RX ADMIN — INSULIN LISPRO 3 UNITS: 100 INJECTION, SOLUTION INTRAVENOUS; SUBCUTANEOUS at 04:00

## 2020-03-21 RX ADMIN — POTASSIUM CHLORIDE 20 MEQ: 29.8 INJECTION, SOLUTION INTRAVENOUS at 06:43

## 2020-03-21 RX ADMIN — ENOXAPARIN SODIUM 40 MG: 40 INJECTION SUBCUTANEOUS at 08:13

## 2020-03-21 RX ADMIN — CARVEDILOL 3.12 MG: 3.12 TABLET, FILM COATED ORAL at 08:14

## 2020-03-21 RX ADMIN — INSULIN LISPRO 3 UNITS: 100 INJECTION, SOLUTION INTRAVENOUS; SUBCUTANEOUS at 12:25

## 2020-03-21 RX ADMIN — POTASSIUM CHLORIDE 20 MEQ: 29.8 INJECTION, SOLUTION INTRAVENOUS at 08:15

## 2020-03-21 ASSESSMENT — PAIN SCALES - GENERAL
PAINLEVEL_OUTOF10: 0

## 2020-03-21 NOTE — PROGRESS NOTES
CC: Severe 3V CAD, hypoxic respiratory failure now resolved, acute encephalopathy now resolved, hyperlipidemia, hypertension    Repeat catheterization earlier today showed severe distal left main disease by DOMONIQUE in the 75% range, LAD disease, circumflex disease, and the 100% mid RCA occlusion with left-to-right collaterals. Patient quite weak and debilitated due to prolonged intubation. Currently we will evaluated for CABG at some point next week. Full consult to follow.

## 2020-03-21 NOTE — PROGRESS NOTES
[facility] 82     Progress Note     Admit Date: 3/13/2020     Reason for follow up: NSTEMI, cardiomyopathy     HPI and Interval History:   Patient seen and examined. Clinical notes reviewed. Telemetry reviewed. No new complaint today. No major events overnight. Denies having chest pain, shortness of breath, dyspnea on exertion, Orthopnea, PND at the time of this visit. Feels weak  Review of System:  All other systems reviewed and are negative except for that noted above. Pertinent negatives are:     · General: negative for fever, chills   · Ophthalmic ROS: negative for - eye pain or loss of vision  · ENT ROS: negative for - headaches, sore throat   · Respiratory: negative for - cough, sputum  · Cardiovascular: Reviewed in HPI  · Gastrointestinal: negative for - abdominal pain, diarrhea, N/V  · Hematology: negative for - bleeding, blood clots, bruising or jaundice  · Genito-Urinary:  negative for - Dysuria or incontinence  · Musculoskeletal: negative for - Joint swelling, muscle pain  · Neurological: negative for - confusion, dizziness, headaches   · Psychiatric: No anxiety, no depression. · Dermatological: negative for - rash      Physical Examination:  Vitals:    20 0807   BP: 139/85   Pulse: 78   Resp: 20   Temp: 97.5 °F (36.4 °C)   SpO2: 94%      In: 1140 [P.O.:1140]  Out: 300    Wt Readings from Last 3 Encounters:   20 165 lb 12.6 oz (75.2 kg)     Temp  Av.3 °F (36.3 °C)  Min: 97 °F (36.1 °C)  Max: 97.7 °F (36.5 °C)  Pulse  Av.1  Min: 66  Max: 79  BP  Min: 117/71  Max: 150/76  SpO2  Av.3 %  Min: 94 %  Max: 97 %    Intake/Output Summary (Last 24 hours) at 3/21/2020 1029  Last data filed at 3/21/2020 0815  Gross per 24 hour   Intake 1140 ml   Output 700 ml   Net 440 ml       · Telemetry: Sinus rhythm NSVT  · Constitutional: Oriented. No distress. · Head: Normocephalic and atraumatic.    · Mouth/Throat: Oropharynx is clear and moist.   · Eyes: Conjunctivae normal. EOM are normal.   · Neck: Neck supple. No rigidity. No JVD present. · Cardiovascular: Normal rate, regular rhythm, S1&S2. · Pulmonary/Chest: Bilateral respiratory sounds. No wheezes, No rhonchi. · Abdominal: Soft. Bowel sounds present. No distension, No tenderness. · Musculoskeletal: No tenderness. No edema    · Lymphadenopathy: Has no cervical adenopathy. · Neurological: Alert and oriented. Cranial nerve appears intact, No Gross deficit   · Skin: Skin is warm and dry. No rash noted. · Psychiatric: Has a normal behavior     Labs, diagnostic and imaging results reviewed. Reviewed. Recent Labs     03/19/20  0240 03/20/20  0505 03/21/20  0355    142 142   K 3.7 3.7 3.4*    106 107   CO2 25 22 24   BUN 25* 25* 26*   CREATININE 0.8 0.6* 0.6*     Recent Labs     03/19/20  0240 03/20/20  0505 03/21/20  0355   WBC 10.1 11.5* 12.1*   HGB 14.9 15.3 15.4   HCT 44.5 45.5 46.1   MCV 91.2 89.3 88.9    286 288     Lab Results   Component Value Date    TROPONINI 0.26 03/13/2020     Estimated Creatinine Clearance: 118 mL/min (A) (based on SCr of 0.6 mg/dL (L)).    No results found for: BNP  Lab Results   Component Value Date    PROTIME 13.2 03/13/2020    INR 1.14 03/13/2020     Lab Results   Component Value Date    TRIG 199 03/17/2020       Scheduled Meds:   furosemide  40 mg Oral Daily    aspirin  325 mg Oral Daily    carvedilol  3.125 mg Oral BID WC    insulin glargine  25 Units Subcutaneous Daily    PHENobarbital  260 mg Intramuscular Once    insulin lispro  0-18 Units Subcutaneous Q4H    sodium chloride flush  10 mL Intravenous 2 times per day    enoxaparin  40 mg Subcutaneous Daily     Continuous Infusions:   dextrose       PRN Meds:magnesium hydroxide, LORazepam, potassium chloride, sodium chloride flush, acetaminophen **OR** acetaminophen, polyethylene glycol, promethazine **OR** ondansetron, magnesium sulfate, dextrose, dextrose     Patient Active Problem List    Diagnosis Date Noted    Acute delirium     Metabolic alkalosis     Hypernatremia     Acute encephalopathy     DM (diabetes mellitus), type 2, uncontrolled with complications (HCC)     Congestive heart failure of unknown etiology ([address] 03/13/2020    3-vessel coronary artery disease 03/13/2020    NSTEMI (non-ST elevated myocardial infarction) ([address] 03/13/2020    HTN (hypertension), benign 03/13/2020    Uncontrolled type 2 diabetes mellitus with hyperglycemia ([address] 33/38/3154    Acute systolic (congestive) heart failure ([address] 03/13/2020    Acute respiratory failure with hypoxia ([address] 03/13/2020    Aspiration pneumonia ([address] 03/13/2020      Active Hospital Problems    Diagnosis Date Noted    Acute delirium [J79.6]     Metabolic alkalosis [Z89.4]     Hypernatremia [E87.0]     Acute encephalopathy [G93.40]     DM (diabetes mellitus), type 2, uncontrolled with complications (HCC) [B89.9, E11.65]     Congestive heart failure of unknown etiology ([address] [I50.9] 03/13/2020    3-vessel coronary artery disease [I25.10] 03/13/2020    NSTEMI (non-ST elevated myocardial infarction) ([address] [I21.4] 03/13/2020    HTN (hypertension), benign [I10] 03/13/2020    Uncontrolled type 2 diabetes mellitus with hyperglycemia ([address] [E11.65] 40/41/2858    Acute systolic (congestive) heart failure ([address] [I50.21] 03/13/2020    Acute respiratory failure with hypoxia ([address] [J96.01] 03/13/2020    Aspiration pneumonia ([address] [J69.0] 03/13/2020     Conclusions      Summary   *Definity contrast administered. *Left ventricle - normal in size and thickness, function is reduced with EF   of 40%   *Wall motion - basal to mid septal hypokinesis.    *Aortic valve - sclerotic   *Mitral valve - mild regurgitation  Assessment:       Plan:     - NSTEMI/CAD/ICM/Acute systolic CHF      LHC with severe mid LAD disease, cx with luminal irregs and RCA an acute occlusion   RCA not revascularized at time of diagnostic LHC at [facility]  TTE        40%     Awaiting CABG if

## 2020-03-21 NOTE — PROGRESS NOTES
CC: Severe 3V CAD, hypoxic respiratory failure now resolved, acute encephalopathy now resolved, hyperlipidemia, hypertension    Repeat catheterization yesterday showed severe left  main disease by IVUS in the 75% range, LAD disease, circumflex disease, and the 100% mid RCA occlusion with left-to-right collaterals. Patient quite weak and debilitated due to prolonged intubation. Currently is being evaluated for CABG at some point next week if he is able to participate     Needs PT/OT consult, right now extremely weak and unsteady on his gait    On dysphagia III diet, will need protein supplementation. Not sure yet about timing for surgery, would favor postponing in order to improve his functional status more, as of now very poor candidate    Full consult to follow.

## 2020-03-21 NOTE — PLAN OF CARE
Free of falls. Pt alert and oriented x4. Fall prevention protocol in place. Wearing non-skid footwear, bed in lowest position and locked. Call light within reach. Bed alarm on.

## 2020-03-22 LAB
ANION GAP SERPL CALCULATED.3IONS-SCNC: 12 MMOL/L (ref 3–16)
BANDED NEUTROPHILS RELATIVE PERCENT: 1 % (ref 0–7)
BASOPHILS ABSOLUTE: 0 K/UL (ref 0–0.2)
BASOPHILS RELATIVE PERCENT: 0 %
BILIRUBIN URINE: NEGATIVE
BLOOD, URINE: ABNORMAL
BUN BLDV-MCNC: 26 MG/DL (ref 7–20)
CALCIUM SERPL-MCNC: 9.1 MG/DL (ref 8.3–10.6)
CHLORIDE BLD-SCNC: 107 MMOL/L (ref 99–110)
CLARITY: CLEAR
CO2: 25 MMOL/L (ref 21–32)
COLOR: YELLOW
CREAT SERPL-MCNC: 0.7 MG/DL (ref 0.8–1.3)
EOSINOPHILS ABSOLUTE: 0.7 K/UL (ref 0–0.6)
EOSINOPHILS RELATIVE PERCENT: 6 %
EPITHELIAL CELLS, UA: 0 /HPF (ref 0–5)
GFR AFRICAN AMERICAN: >60
GFR NON-AFRICAN AMERICAN: >60
GLUCOSE BLD-MCNC: 112 MG/DL (ref 70–99)
GLUCOSE BLD-MCNC: 122 MG/DL (ref 70–99)
GLUCOSE BLD-MCNC: 128 MG/DL (ref 70–99)
GLUCOSE BLD-MCNC: 134 MG/DL (ref 70–99)
GLUCOSE BLD-MCNC: 156 MG/DL (ref 70–99)
GLUCOSE BLD-MCNC: 192 MG/DL (ref 70–99)
GLUCOSE BLD-MCNC: 297 MG/DL (ref 70–99)
GLUCOSE BLD-MCNC: 348 MG/DL (ref 70–99)
GLUCOSE URINE: >=1000 MG/DL
HCT VFR BLD CALC: 46.5 % (ref 40.5–52.5)
HEMOGLOBIN: 15.6 G/DL (ref 13.5–17.5)
HYALINE CASTS: 1 /LPF (ref 0–8)
KETONES, URINE: NEGATIVE MG/DL
LEUKOCYTE ESTERASE, URINE: NEGATIVE
LYMPHOCYTES ABSOLUTE: 2 K/UL (ref 1–5.1)
LYMPHOCYTES RELATIVE PERCENT: 16 %
MAGNESIUM: 2.4 MG/DL (ref 1.8–2.4)
MCH RBC QN AUTO: 30 PG (ref 26–34)
MCHC RBC AUTO-ENTMCNC: 33.6 G/DL (ref 31–36)
MCV RBC AUTO: 89.5 FL (ref 80–100)
MICROSCOPIC EXAMINATION: YES
MONOCYTES ABSOLUTE: 1.5 K/UL (ref 0–1.3)
MONOCYTES RELATIVE PERCENT: 12 %
NEUTROPHILS ABSOLUTE: 8.1 K/UL (ref 1.7–7.7)
NEUTROPHILS RELATIVE PERCENT: 65 %
NITRITE, URINE: NEGATIVE
PDW BLD-RTO: 12.9 % (ref 12.4–15.4)
PERFORMED ON: ABNORMAL
PH UA: 6 (ref 5–8)
PLATELET # BLD: 329 K/UL (ref 135–450)
PLATELET SLIDE REVIEW: ADEQUATE
PMV BLD AUTO: 9.4 FL (ref 5–10.5)
POTASSIUM REFLEX MAGNESIUM: 3.5 MMOL/L (ref 3.5–5.1)
PROTEIN UA: NEGATIVE MG/DL
RBC # BLD: 5.2 M/UL (ref 4.2–5.9)
RBC # BLD: NORMAL 10*6/UL
RBC UA: 482 /HPF (ref 0–4)
SLIDE REVIEW: ABNORMAL
SODIUM BLD-SCNC: 144 MMOL/L (ref 136–145)
SPECIFIC GRAVITY UA: >1.03 (ref 1–1.03)
URINE REFLEX TO CULTURE: YES
URINE TYPE: ABNORMAL
UROBILINOGEN, URINE: 0.2 E.U./DL
WBC # BLD: 12.2 K/UL (ref 4–11)
WBC UA: 12 /HPF (ref 0–5)

## 2020-03-22 PROCEDURE — 97530 THERAPEUTIC ACTIVITIES: CPT

## 2020-03-22 PROCEDURE — 97535 SELF CARE MNGMENT TRAINING: CPT

## 2020-03-22 PROCEDURE — 94761 N-INVAS EAR/PLS OXIMETRY MLT: CPT

## 2020-03-22 PROCEDURE — 92526 ORAL FUNCTION THERAPY: CPT

## 2020-03-22 PROCEDURE — 97129 THER IVNTJ 1ST 15 MIN: CPT

## 2020-03-22 PROCEDURE — 6370000000 HC RX 637 (ALT 250 FOR IP): Performed by: INTERNAL MEDICINE

## 2020-03-22 PROCEDURE — 83735 ASSAY OF MAGNESIUM: CPT

## 2020-03-22 PROCEDURE — 81001 URINALYSIS AUTO W/SCOPE: CPT

## 2020-03-22 PROCEDURE — 2000000000 HC ICU R&B

## 2020-03-22 PROCEDURE — 97116 GAIT TRAINING THERAPY: CPT

## 2020-03-22 PROCEDURE — 36592 COLLECT BLOOD FROM PICC: CPT

## 2020-03-22 PROCEDURE — 2580000003 HC RX 258: Performed by: INTERNAL MEDICINE

## 2020-03-22 PROCEDURE — 80048 BASIC METABOLIC PNL TOTAL CA: CPT

## 2020-03-22 PROCEDURE — 85025 COMPLETE CBC W/AUTO DIFF WBC: CPT

## 2020-03-22 PROCEDURE — 99233 SBSQ HOSP IP/OBS HIGH 50: CPT | Performed by: INTERNAL MEDICINE

## 2020-03-22 PROCEDURE — 87086 URINE CULTURE/COLONY COUNT: CPT

## 2020-03-22 PROCEDURE — 6360000002 HC RX W HCPCS: Performed by: INTERNAL MEDICINE

## 2020-03-22 RX ORDER — LOPERAMIDE HYDROCHLORIDE 2 MG/1
2 CAPSULE ORAL DAILY
Status: DISCONTINUED | OUTPATIENT
Start: 2020-03-22 | End: 2020-03-24

## 2020-03-22 RX ORDER — POTASSIUM CHLORIDE 20 MEQ/1
20 TABLET, EXTENDED RELEASE ORAL ONCE
Status: COMPLETED | OUTPATIENT
Start: 2020-03-22 | End: 2020-03-22

## 2020-03-22 RX ADMIN — ENOXAPARIN SODIUM 40 MG: 40 INJECTION SUBCUTANEOUS at 08:11

## 2020-03-22 RX ADMIN — LOPERAMIDE HYDROCHLORIDE 2 MG: 2 CAPSULE ORAL at 10:43

## 2020-03-22 RX ADMIN — INSULIN LISPRO 3 UNITS: 100 INJECTION, SOLUTION INTRAVENOUS; SUBCUTANEOUS at 20:03

## 2020-03-22 RX ADMIN — CARVEDILOL 3.12 MG: 3.12 TABLET, FILM COATED ORAL at 08:11

## 2020-03-22 RX ADMIN — ASPIRIN 325 MG ORAL TABLET 325 MG: 325 PILL ORAL at 08:11

## 2020-03-22 RX ADMIN — INSULIN LISPRO 9 UNITS: 100 INJECTION, SOLUTION INTRAVENOUS; SUBCUTANEOUS at 12:34

## 2020-03-22 RX ADMIN — INSULIN LISPRO 12 UNITS: 100 INJECTION, SOLUTION INTRAVENOUS; SUBCUTANEOUS at 16:10

## 2020-03-22 RX ADMIN — FUROSEMIDE 40 MG: 40 TABLET ORAL at 08:11

## 2020-03-22 RX ADMIN — Medication 10 ML: at 20:04

## 2020-03-22 RX ADMIN — CARVEDILOL 3.12 MG: 3.12 TABLET, FILM COATED ORAL at 17:07

## 2020-03-22 RX ADMIN — INSULIN GLARGINE 25 UNITS: 100 INJECTION, SOLUTION SUBCUTANEOUS at 08:11

## 2020-03-22 RX ADMIN — POTASSIUM CHLORIDE 20 MEQ: 1500 TABLET, EXTENDED RELEASE ORAL at 10:43

## 2020-03-22 RX ADMIN — Medication 10 ML: at 08:12

## 2020-03-22 ASSESSMENT — PAIN SCALES - GENERAL
PAINLEVEL_OUTOF10: 0

## 2020-03-22 NOTE — PROGRESS NOTES
Physical Therapy  Facility/Department: [facility] CVU  Daily Treatment Note  NAME: Haider Tsai  : 1955  MRN: [id number]    Date of Service: 3/22/2020    Discharge Recommendations: Haider Tsai scored a 18/24 on the AM-PAC short mobility form. Current research shows that an AM-PAC score of 17 or less is typically not associated with a discharge to the patient's home setting. Based on the patients AM-PAC score and their current functional mobility deficits, it is recommended that the patient have 5-7 sessions per week of Physical Therapy at d/c to increase the patients independence. If patient discharges prior to next session this note will serve as a discharge summary. Please see below for the latest assessment towards goals. PT Equipment Recommendations  Equipment Needed: No  Other: Defer until post CABG. Assessment   Body structures, Functions, Activity limitations: Decreased functional mobility ; Decreased ADL status; Decreased endurance;Decreased strength;Decreased balance; Increased pain  Assessment: Patient improving ambulating distance, balance, endurance but still requiring rollator for safety. Prognosis: Good  Decision Making: Medium Complexity  PT Education: Gait Training  Patient Education: Patient educated on ambulating at minimum 3x daily w/ RN staff to improve overall conditioning prior to surgery. REQUIRES PT FOLLOW UP: Yes  Activity Tolerance  Activity Tolerance: Patient Tolerated treatment well     Patient Diagnosis(es): There were no encounter diagnoses. has a past medical history of CAD (coronary artery disease), CHF (congestive heart failure) ([address] Diabetes mellitus ([address] Hyperlipidemia, and Hypertension. has a past surgical history that includes Cardiac surgery and Colonoscopy.     Restrictions  Restrictions/Precautions  Restrictions/Precautions: Fall Risk  Required Braces or Orthoses?: No  Position Activity Restriction  Other position/activity restrictions: Carlos Hairston Shabana is 60 y.o. male who presented with complaint of chest pain. Symptom onset was acute for a time period of 1 week. The severity is described as moderate. The course of his symptoms over time is worsening. The symptoms improved with rest and worsened with exertion. The patient's symptom is associated with SOB. Subjective   General  Chart Reviewed: Yes  Response To Previous Treatment: Patient with no complaints from previous session. Family / Caregiver Present: No  Subjective  Subjective: Patient denies any current issues. He reports ambulating with nursing staff earlier today. General Comment  Comments: Patient sitting up in chair. Pain Screening  Patient Currently in Pain: Denies  Vital Signs  Patient Currently in Pain: Denies       Orientation  Orientation  Overall Orientation Status: Within Normal Limits      Objective   Bed mobility  Sit to Supine: Stand by assistance  Transfers  Sit to Stand: Stand by assistance  Stand to sit: Stand by assistance  Bed to Chair: Stand by assistance  Stand Pivot Transfers: Stand by assistance  Ambulation  Ambulation?: Yes  More Ambulation?: No  Ambulation 1  Surface: level tile  Device: Rollator  Assistance: Stand by assistance  Quality of Gait: Steady, methodical, reciprocal step pattern. Distance: 350'  Comments: No SOB. Heart rate 100 bpm, O2 95% post ambulation.   Stairs/Curb  Stairs?: Yes  Stairs  # Steps : 3  Stairs Height: 6\"  Rails: Left ascending  Device: No Device  Assistance: Contact guard assistance     Balance  Posture: Good  Sitting - Static: Good  Sitting - Dynamic: Good  Standing - Static: Fair;+  Standing - Dynamic: Fair      AROM RLE (degrees)  RLE AROM: WFL  AROM LLE (degrees)  LLE AROM : WFL  Strength RLE  Strength RLE: L  Strength LLE  Strength LLE: [facility] Score  AM-PAC Inpatient Mobility Raw Score : 18 (03/22/20 1341)  AM-PAC Inpatient T-Scale Score : 43.63 (03/22/20 1341)  Mobility Inpatient CMS 0-100% Score: 46.58 (03/22/20

## 2020-03-22 NOTE — PROGRESS NOTES
[facility]    3/22/2020 10:14 AM        Name: Diamond Stewart . Admitted: 3/13/2020  Primary Care Provider: No primary care provider on file.  (Tel: None)      Subjective:    Patient lying in bed no chest pain or sob awaiting surgery  Reviewed interval ancillary notes    Current Medications  potassium chloride (KLOR-CON M) extended release tablet 20 mEq, Once  loperamide (IMODIUM) capsule 2 mg, Daily  furosemide (LASIX) tablet 40 mg, Daily  aspirin tablet 325 mg, Daily  magnesium hydroxide (MILK OF MAGNESIA) 400 MG/5ML suspension 30 mL, Daily PRN  carvedilol (COREG) tablet 3.125 mg, BID WC  insulin glargine (LANTUS;BASAGLAR) injection pen 25 Units, Daily  PHENobarbital (LUMINAL) injection 260 mg, Once  LORazepam (ATIVAN) injection 1 mg, Q4H PRN  potassium chloride 20 mEq/50 mL IVPB (Central Line), PRN  insulin lispro (1 Unit Dial) 0-18 Units, Q4H  sodium chloride flush 0.9 % injection 10 mL, 2 times per day  sodium chloride flush 0.9 % injection 10 mL, PRN  acetaminophen (TYLENOL) tablet 650 mg, Q6H PRN    Or  acetaminophen (TYLENOL) suppository 650 mg, Q6H PRN  polyethylene glycol (GLYCOLAX) packet 17 g, Daily PRN  promethazine (PHENERGAN) tablet 12.5 mg, Q6H PRN    Or  ondansetron (ZOFRAN) injection 4 mg, Q6H PRN  enoxaparin (LOVENOX) injection 40 mg, Daily  magnesium sulfate 2 g in 50 mL IVPB premix, PRN  dextrose 50 % IV solution, PRN  dextrose 5 % solution, PRN        Objective:  BP (!) 135/92   Pulse 78   Temp 97.5 °F (36.4 °C) (Temporal)   Resp 16   Ht 5' 7.5\" (1.715 m)   Wt 164 lb 7.4 oz (74.6 kg)   SpO2 95%   BMI 25.38 kg/m²     Intake/Output Summary (Last 24 hours) at 3/22/2020 1014  Last data filed at 3/22/2020 0914  Gross per 24 hour   Intake 970 ml   Output 1025 ml   Net -55 ml      Wt Readings from Last 3 Encounters:   03/22/20 164 lb 7.4 oz (74.6 kg)       General appearance: AAOx3  HEENT: atraumatic, Pupils equal, muscous membranes moist, no masses appreciated  Cardiovascular: Regular rate and rhythm no murmurs cap refill < 2sec   Respiratory: ctab  Gastrointestinal: Abdomen soft, non-tender, BS+  EXT:no edema  Neurology:following commands  Psychiatry: calm  Skin: Warm, dry, no rashes appreciated    Labs and Tests:  CBC:   Recent Labs     03/20/20  0505 03/21/20  0355 03/22/20  0330   WBC 11.5* 12.1* 12.2*   HGB 15.3 15.4 15.6    288 329     BMP:    Recent Labs     03/20/20  0505 03/21/20  0355 03/21/20  1132 03/22/20  0330    142  --  144   K 3.7 3.4* 4.2 3.5    107  --  107   CO2 22 24  --  25   BUN 25* 26*  --  26*   CREATININE 0.6* 0.6*  --  0.7*   GLUCOSE 220* 152*  --  156*     Hepatic:   No results for input(s): AST, ALT, ALB, BILITOT, ALKPHOS in the last 72 hours. XR CHEST PORTABLE   Final Result   1. NG tube tip is barely in the stomach and side hole is near the GE   junction. This should be further advanced. 2. Bibasilar infiltrates and bilateral pleural effusions are unchanged. MRI BRAIN WO CONTRAST   Final Result   1. Extensive patient motion limits evaluation. 2. No convincing acute intracranial abnormality or acute infarct within the   limitations of patient motion. 3. Mild global parenchymal volume loss with chronic microvascular ischemic   changes. 4. Scattered mucosal thickening of the paranasal sinuses with bilateral   mastoid effusions. XR CHEST PORTABLE   Final Result   No significant interval change. XR CHEST PORTABLE   Final Result   1. Radiographic findings most suggestive of pulmonary edema/CHF. 2. Recommend advancing enteric tube by 5 cm. 3. Additional support devices as described above.              Recent imaging reviewed    Problem List  Principal Problem:    NSTEMI (non-ST elevated myocardial infarction) [facility])  Active Problems:    Congestive heart failure of unknown etiology ([facility]    3-vessel coronary artery disease    HTN (hypertension), benign    Uncontrolled type 2 diabetes mellitus with hyperglycemia (HCC)    Acute systolic (congestive) heart failure (HCC)    Acute respiratory failure with hypoxia (HCC)    Aspiration pneumonia (HCC)    Acute encephalopathy    DM (diabetes mellitus), type 2, uncontrolled with complications (HCC)    Metabolic alkalosis    Hypernatremia    Acute delirium    Ischemic cardiomyopathy    Hypokalemia  Resolved Problems:    * No resolved hospital problems. *       Assessment & Plan:   Acute hypoxic respiratory failure secondary to new onset acute systolic heart failure   - continue oral lasix bblocker      Moderate Oropharyngeal Oropharyngeal Dysphagia:   - Dysphagia II Minced and Moist with Mildly Thick (Nectar) Liquids, no straws, meds crushed in puree    - speech on board    Hypernatremia: improved monitor    Acute encephalopathy: MRI neg but limited  -- mental status improved monitor    NSTEMI:  3 vessel CAD,   - Echo-Left ventricle - normal in size and thickness, function is reduced with EF   of 40%  -if surgery delayed can consider ARU for rehab temporary if ok with caridology    DM2: withhyperglcemia  - improved monitor    Hypokalemia:give 20meq today          Diet: DIET DYSPHAGIA MINCED AND MOIST; Mildly Thick (Nectar);  No Drinking Straw  Dietary Nutrition Supplements: Diabetic Oral Supplement  Dietary Nutrition Supplements: Other Oral Supplement (see comment)  Code:Full Code  DVT PPXlovenox  Disposition  Pending cts plans    Tobi Naranjo MD   3/22/2020 10:14 AM

## 2020-03-22 NOTE — PROGRESS NOTES
surgical history that includes Cardiac surgery and Colonoscopy. Restrictions  Restrictions/Precautions  Restrictions/Precautions: Fall Risk  Required Braces or Orthoses?: No  Position Activity Restriction  Other position/activity restrictions: Haider Tsai is 60 y.o. male who presented with complaint of chest pain. Symptom onset was acute for a time period of 1 week. The severity is described as moderate. The course of his symptoms over time is worsening. The symptoms improved with rest and worsened with exertion. The patient's symptom is associated with SOB. Subjective   General  Chart Reviewed: Yes  Response to previous treatment: Patient with no complaints from previous session  Family / Caregiver Present: No  Diagnosis: SOB  Subjective  Subjective: Pt seated in recliner upon entry. Pleasant, agreeable to therapy session and very motivated. RN approval.  General Comment  Comments: Pt sit to stand SBA, pt ambulated with rollator SBA ~10 ft to sink in bathroom. Pt in stance ~12 min at sink at Supervision for grooming (oral care/shave). Pt sink stance SBA at toilet to void. Pt in stance at sink ~1 min to wash hands. Pt ambulated in room with rollator SBA to chair, stand to sit SBA for seated rest break. Pt donned pants SBA. Pt ambulated in hallway SBa with rollator and 3 stairs up/3 down at CGA. Back in room pt stand to sit SBA and sit to supine SBA. Call light in reach and bed alarm on.   Vital Signs  Patient Currently in Pain: Denies   Orientation  Orientation  Overall Orientation Status: Within Functional Limits  Objective    ADL  Grooming: Supervision(in stance)  LE Dressing: Stand by assistance(don hospital pants)  Toileting: Stand by assistance(in stance to void)        Balance  Standing Balance: Stand by assistance(SBA at toilet, Supervision at sink)  Standing Balance  Time: ~25 min total  Activity: to/from bathroom, in stance to groom at sink x 2, in stance to void, ambulation in hallway + 3

## 2020-03-22 NOTE — PLAN OF CARE
Problem: HEMODYNAMIC STATUS  Goal: Patient has stable vital signs and fluid balance  Outcome: Ongoing  Note: VSS will continue to monitor      Problem: Falls - Risk of:  Goal: Will remain free from falls  Description: Will remain free from falls  Outcome: Ongoing  Note: Fall risk precautions in place, Fall risk assessment protocol followed. Bed in lowest position with wheels locked, SR up X2, bed alarm in place and activated, yellow blanket on bed, non-skid socks on pt, fall risk ID on pt arm, call light in reach, pt encouraged to call before getting out of bed and for any other needs or complaints. No falls this shift. Will continue to monitor. Goal: Absence of physical injury  Description: Absence of physical injury  Outcome: Ongoing     Problem: Pain Control  Goal: Maintain pain level at or below patient's acceptable level (or 5 if patient is unable to determine acceptable level)  Outcome: Ongoing  Note: Pain level assessment complete. Pt rated pain at 0/10. Pt educated on pain scale and control interventions. PRN pain medication given per pt request. Pt VU to call out c new onset of pain or unrelieved pain. Will continue to monitor.

## 2020-03-22 NOTE — PROGRESS NOTES
CC: Severe 3V CAD, hypoxic respiratory failure now resolved, acute encephalopathy now resolved, hyperlipidemia, hypertension    Repeat catheterization yesterday showed severe left  main disease by IVUS in the 75% range, LAD disease, circumflex disease, and the 100% mid RCA occlusion with left-to-right collaterals. Patient quite weak and debilitated due to prolonged intubation. Currently is being evaluated for CABG at some point next week if he is able to participate in rehab    Needs PT/OT consult, right now extremely weak and unsteady on his gait    On dysphagia III diet, will need protein supplementation.  Will await for his dysphagia to improve to avoid risk of aspiration and improve nutrition status before another round of endotracheal intubation and general anesthesia    Not sure yet about timing for surgery, would favor postponing in order to improve his functional status more, as of now very poor candidate    All pertinent questions were answered

## 2020-03-22 NOTE — PROGRESS NOTES
Speech  Language and Dysphagia Treatment Note    Name: Bob Meek  : 1955  Medical Diagnosis: Congestive heart failure of unknown etiology ([facility] [address] [I50.9]  Treatment Diagnosis: Moderate Oropharyngeal Oropharyngeal Dysphagia, Mild Dysarthria, Cognitive-Linguistic Deficits   Pain: Denied    Patient's response to therapy:  Pt was alert and cooperative throughout tx. Dysphagia Treatment:  Current Diet Level:   1.) Dysphagia III Soft and Bite-Sized with Mildly Thick (Nectar) Liquids, no straws, meds in puree  2.) If difficulty is noted, recommend downgrade to NPO with ongoing swallowing assessment     Tolerance of Current Diet Level: RN reported that pt is tolerating current diet without overt difficulty, and that pt is eager to be cleared for thin liquids. Assessment of Texture Tolerance:  -Impressions:   Pt was seen sitting up in bed, alert and on room air. Facilitated trials of thin liquid via cup, mildly thick liquid via cup, and advanced solid (monique cracker). Pt exhibited mildy prolonged mastication and bolus formation and reduced oral control; also suspect premature spill to pharynx and delayed swallow onset. Delayed coughing x1 with thin liquid; also question intermittent delayed faint throat clearing with thin liquid. No overt s/s of aspiration noted with nectar-thick liquid or solid.      Dysphagia Goals, addressed this date:  1.) Pt will tolerate recommended diet without s/s of aspiration (ongoing 3/22/2020)   2.) If clinical symptoms of penetration/aspiration continue to be noted,Pt will tolerate MBS to r/o aspiration and determine appropriate diet/liquid level. (ongoing 3/22/2020)   3.) Pt will tolerate diet advance to least restricted diet, as clinically indicated, with no signs of aspiration (ongoing 3/22/2020)   4.) Pt will improve oral motor function via bolus control exercises  (ongoing 3/22/2020)     Diet and Treatment Recommendations:  1.) Continue current diet- Dysphagia III Soft Continue speech therapy for speech-language/cognitive-linguistic tx if goals are not met at time of d/c. Treatment time  Timed Code Treatment Minutes: 10 minutes  Total Treatment time: 23 minutes    If patient discharges prior to next session this note will serve as a discharge summary.       Sujey Murray M.S. [id number] [facility]   Speech-Language Pathologist

## 2020-03-22 NOTE — PROGRESS NOTES
Pt ambulated in the hull with four wheel walker. Practiced sitting and standing without using arms per surgeons request. Pt tolerated well, will continue to work with pt. Pt now resting in bed.

## 2020-03-22 NOTE — PROGRESS NOTES
Alex 82     Progress Note     Admit Date: 3/13/2020     Reason for follow up: NSTEMI, cardiomyopathy     HPI and Interval History:   Patient seen and examined. Clinical notes reviewed. Telemetry reviewed. No new complaint today. No major events overnight. Denies having chest pain, shortness of breath, dyspnea on exertion, Orthopnea, PND at the time of this visit. Feels weak but better today  Review of System:  All other systems reviewed and are negative except for that noted above. Pertinent negatives are:     · General: negative for fever, chills   · Ophthalmic ROS: negative for - eye pain or loss of vision  · ENT ROS: negative for - headaches, sore throat   · Respiratory: negative for - cough, sputum  · Cardiovascular: Reviewed in HPI  · Gastrointestinal: negative for - abdominal pain, diarrhea, N/V  · Hematology: negative for - bleeding, blood clots, bruising or jaundice  · Genito-Urinary:  negative for - Dysuria or incontinence  · Musculoskeletal: negative for - Joint swelling, muscle pain  · Neurological: negative for - confusion, dizziness, headaches   · Psychiatric: No anxiety, no depression. · Dermatological: negative for - rash      Physical Examination:  Vitals:    20 0914   BP:    Pulse:    Resp: 16   Temp:    SpO2: 95%      In: 810 [P.O.:810]  Out: 600    Wt Readings from Last 3 Encounters:   20 164 lb 7.4 oz (74.6 kg)     Temp  Av.4 °F (36.3 °C)  Min: 96.7 °F (35.9 °C)  Max: 98.1 °F (36.7 °C)  Pulse  Av.3  Min: 68  Max: 85  BP  Min: 118/69  Max: 153/76  SpO2  Av.7 %  Min: 94 %  Max: 98 %    Intake/Output Summary (Last 24 hours) at 3/22/2020 1059  Last data filed at 3/22/2020 1046  Gross per 24 hour   Intake 1090 ml   Output 1025 ml   Net 65 ml       · Telemetry: Sinus rhythm NSVT  · Constitutional: Oriented. No distress. · Head: Normocephalic and atraumatic.    · Mouth/Throat: Oropharynx is clear and moist.   · Eyes: Conjunctivae normal. EOM are normal. · Neck: Neck supple. No rigidity. No JVD present. · Cardiovascular: Normal rate, regular rhythm, S1&S2. · Pulmonary/Chest: Bilateral respiratory sounds. No wheezes, No rhonchi. · Abdominal: Soft. Bowel sounds present. No distension, No tenderness. · Musculoskeletal: No tenderness. No edema    · Lymphadenopathy: Has no cervical adenopathy. · Neurological: Alert and oriented. Cranial nerve appears intact, No Gross deficit   · Skin: Skin is warm and dry. No rash noted. · Psychiatric: Has a normal behavior     Labs, diagnostic and imaging results reviewed. Reviewed. Recent Labs     03/20/20  0505 03/21/20  0355 03/21/20  1132 03/22/20  0330    142  --  144   K 3.7 3.4* 4.2 3.5    107  --  107   CO2 22 24  --  25   BUN 25* 26*  --  26*   CREATININE 0.6* 0.6*  --  0.7*     Recent Labs     03/20/20  0505 03/21/20  0355 03/22/20  0330   WBC 11.5* 12.1* 12.2*   HGB 15.3 15.4 15.6   HCT 45.5 46.1 46.5   MCV 89.3 88.9 89.5    288 329     Lab Results   Component Value Date    TROPONINI 0.26 03/13/2020     Estimated Creatinine Clearance: 101 mL/min (A) (based on SCr of 0.7 mg/dL (L)).    No results found for: BNP  Lab Results   Component Value Date    PROTIME 13.2 03/13/2020    INR 1.14 03/13/2020     Lab Results   Component Value Date    TRIG 199 03/17/2020       Scheduled Meds:   loperamide  2 mg Oral Daily    furosemide  40 mg Oral Daily    aspirin  325 mg Oral Daily    carvedilol  3.125 mg Oral BID WC    insulin glargine  25 Units Subcutaneous Daily    PHENobarbital  260 mg Intramuscular Once    insulin lispro  0-18 Units Subcutaneous Q4H    sodium chloride flush  10 mL Intravenous 2 times per day    enoxaparin  40 mg Subcutaneous Daily     Continuous Infusions:   dextrose       PRN Meds:magnesium hydroxide, LORazepam, potassium chloride, sodium chloride flush, acetaminophen **OR** acetaminophen, polyethylene glycol, promethazine **OR** ondansetron, magnesium sulfate,

## 2020-03-23 ENCOUNTER — APPOINTMENT (OUTPATIENT)
Dept: CT IMAGING | Age: 65
DRG: 233 | End: 2020-03-23
Attending: INTERNAL MEDICINE
Payer: COMMERCIAL

## 2020-03-23 LAB
ABO/RH: NORMAL
ANION GAP SERPL CALCULATED.3IONS-SCNC: 13 MMOL/L (ref 3–16)
ANTIBODY SCREEN: NORMAL
APTT: 35.6 SEC (ref 24.2–36.2)
BASOPHILS ABSOLUTE: 0.1 K/UL (ref 0–0.2)
BASOPHILS RELATIVE PERCENT: 0.9 %
BUN BLDV-MCNC: 22 MG/DL (ref 7–20)
CALCIUM SERPL-MCNC: 8.7 MG/DL (ref 8.3–10.6)
CHLORIDE BLD-SCNC: 107 MMOL/L (ref 99–110)
CO2: 22 MMOL/L (ref 21–32)
CREAT SERPL-MCNC: 0.6 MG/DL (ref 0.8–1.3)
EOSINOPHILS ABSOLUTE: 0.7 K/UL (ref 0–0.6)
EOSINOPHILS RELATIVE PERCENT: 7 %
GFR AFRICAN AMERICAN: >60
GFR NON-AFRICAN AMERICAN: >60
GLUCOSE BLD-MCNC: 133 MG/DL (ref 70–99)
GLUCOSE BLD-MCNC: 137 MG/DL (ref 70–99)
GLUCOSE BLD-MCNC: 150 MG/DL (ref 70–99)
GLUCOSE BLD-MCNC: 193 MG/DL (ref 70–99)
GLUCOSE BLD-MCNC: 322 MG/DL (ref 70–99)
HCT VFR BLD CALC: 42 % (ref 40.5–52.5)
HEMOGLOBIN: 14.2 G/DL (ref 13.5–17.5)
INR BLD: 1.16 (ref 0.86–1.14)
LYMPHOCYTES ABSOLUTE: 2.2 K/UL (ref 1–5.1)
LYMPHOCYTES RELATIVE PERCENT: 20.8 %
MCH RBC QN AUTO: 30.2 PG (ref 26–34)
MCHC RBC AUTO-ENTMCNC: 33.9 G/DL (ref 31–36)
MCV RBC AUTO: 89.3 FL (ref 80–100)
MONOCYTES ABSOLUTE: 1 K/UL (ref 0–1.3)
MONOCYTES RELATIVE PERCENT: 9.2 %
NEUTROPHILS ABSOLUTE: 6.6 K/UL (ref 1.7–7.7)
NEUTROPHILS RELATIVE PERCENT: 62.1 %
PDW BLD-RTO: 12.7 % (ref 12.4–15.4)
PERFORMED ON: ABNORMAL
PLATELET # BLD: 290 K/UL (ref 135–450)
PMV BLD AUTO: 8.9 FL (ref 5–10.5)
POTASSIUM REFLEX MAGNESIUM: 3.8 MMOL/L (ref 3.5–5.1)
PROTHROMBIN TIME: 13.5 SEC (ref 10–13.2)
RBC # BLD: 4.71 M/UL (ref 4.2–5.9)
SODIUM BLD-SCNC: 142 MMOL/L (ref 136–145)
URINE CULTURE, ROUTINE: NORMAL
WBC # BLD: 10.6 K/UL (ref 4–11)

## 2020-03-23 PROCEDURE — 6360000002 HC RX W HCPCS: Performed by: INTERNAL MEDICINE

## 2020-03-23 PROCEDURE — 6370000000 HC RX 637 (ALT 250 FOR IP): Performed by: INTERNAL MEDICINE

## 2020-03-23 PROCEDURE — 86901 BLOOD TYPING SEROLOGIC RH(D): CPT

## 2020-03-23 PROCEDURE — 97535 SELF CARE MNGMENT TRAINING: CPT

## 2020-03-23 PROCEDURE — 6370000000 HC RX 637 (ALT 250 FOR IP): Performed by: NURSE PRACTITIONER

## 2020-03-23 PROCEDURE — 80048 BASIC METABOLIC PNL TOTAL CA: CPT

## 2020-03-23 PROCEDURE — 2580000003 HC RX 258: Performed by: INTERNAL MEDICINE

## 2020-03-23 PROCEDURE — 97530 THERAPEUTIC ACTIVITIES: CPT

## 2020-03-23 PROCEDURE — 71275 CT ANGIOGRAPHY CHEST: CPT

## 2020-03-23 PROCEDURE — 99233 SBSQ HOSP IP/OBS HIGH 50: CPT | Performed by: INTERNAL MEDICINE

## 2020-03-23 PROCEDURE — 97116 GAIT TRAINING THERAPY: CPT

## 2020-03-23 PROCEDURE — 85025 COMPLETE CBC W/AUTO DIFF WBC: CPT

## 2020-03-23 PROCEDURE — 94760 N-INVAS EAR/PLS OXIMETRY 1: CPT

## 2020-03-23 PROCEDURE — P9016 RBC LEUKOCYTES REDUCED: HCPCS

## 2020-03-23 PROCEDURE — 86923 COMPATIBILITY TEST ELECTRIC: CPT

## 2020-03-23 PROCEDURE — 85610 PROTHROMBIN TIME: CPT

## 2020-03-23 PROCEDURE — 86900 BLOOD TYPING SEROLOGIC ABO: CPT

## 2020-03-23 PROCEDURE — 2000000000 HC ICU R&B

## 2020-03-23 PROCEDURE — 92526 ORAL FUNCTION THERAPY: CPT

## 2020-03-23 PROCEDURE — 2580000003 HC RX 258: Performed by: NURSE PRACTITIONER

## 2020-03-23 PROCEDURE — 6360000004 HC RX CONTRAST MEDICATION: Performed by: THORACIC SURGERY (CARDIOTHORACIC VASCULAR SURGERY)

## 2020-03-23 PROCEDURE — 85730 THROMBOPLASTIN TIME PARTIAL: CPT

## 2020-03-23 PROCEDURE — 93971 EXTREMITY STUDY: CPT

## 2020-03-23 PROCEDURE — 93880 EXTRACRANIAL BILAT STUDY: CPT

## 2020-03-23 PROCEDURE — 97129 THER IVNTJ 1ST 15 MIN: CPT

## 2020-03-23 PROCEDURE — 86850 RBC ANTIBODY SCREEN: CPT

## 2020-03-23 RX ORDER — CHLORHEXIDINE GLUCONATE 4 G/100ML
SOLUTION TOPICAL SEE ADMIN INSTRUCTIONS
Status: DISCONTINUED | OUTPATIENT
Start: 2020-03-23 | End: 2020-03-24 | Stop reason: HOSPADM

## 2020-03-23 RX ORDER — LISINOPRIL 10 MG/1
5 TABLET ORAL DAILY
Status: DISCONTINUED | OUTPATIENT
Start: 2020-03-23 | End: 2020-03-24

## 2020-03-23 RX ORDER — SODIUM CHLORIDE 0.9 % (FLUSH) 0.9 %
10 SYRINGE (ML) INJECTION PRN
Status: DISCONTINUED | OUTPATIENT
Start: 2020-03-23 | End: 2020-03-24

## 2020-03-23 RX ORDER — SODIUM CHLORIDE 0.9 % (FLUSH) 0.9 %
10 SYRINGE (ML) INJECTION EVERY 12 HOURS SCHEDULED
Status: DISCONTINUED | OUTPATIENT
Start: 2020-03-23 | End: 2020-03-24

## 2020-03-23 RX ORDER — PANTOPRAZOLE SODIUM 40 MG/10ML
40 INJECTION, POWDER, LYOPHILIZED, FOR SOLUTION INTRAVENOUS ONCE
Status: COMPLETED | OUTPATIENT
Start: 2020-03-24 | End: 2020-03-24

## 2020-03-23 RX ORDER — ALPRAZOLAM 0.25 MG/1
0.25 TABLET ORAL
Status: COMPLETED | OUTPATIENT
Start: 2020-03-23 | End: 2020-03-23

## 2020-03-23 RX ORDER — SODIUM CHLORIDE, SODIUM LACTATE, POTASSIUM CHLORIDE, CALCIUM CHLORIDE 600; 310; 30; 20 MG/100ML; MG/100ML; MG/100ML; MG/100ML
INJECTION, SOLUTION INTRAVENOUS CONTINUOUS
Status: DISCONTINUED | OUTPATIENT
Start: 2020-03-24 | End: 2020-03-24

## 2020-03-23 RX ORDER — FUROSEMIDE 20 MG/1
20 TABLET ORAL DAILY
Status: DISCONTINUED | OUTPATIENT
Start: 2020-03-24 | End: 2020-03-24

## 2020-03-23 RX ADMIN — LISINOPRIL 5 MG: 10 TABLET ORAL at 11:00

## 2020-03-23 RX ADMIN — INSULIN GLARGINE 25 UNITS: 100 INJECTION, SOLUTION SUBCUTANEOUS at 08:43

## 2020-03-23 RX ADMIN — Medication 10 ML: at 20:11

## 2020-03-23 RX ADMIN — CARVEDILOL 3.12 MG: 3.12 TABLET, FILM COATED ORAL at 17:24

## 2020-03-23 RX ADMIN — IOPAMIDOL 75 ML: 755 INJECTION, SOLUTION INTRAVENOUS at 12:29

## 2020-03-23 RX ADMIN — CARVEDILOL 3.12 MG: 3.12 TABLET, FILM COATED ORAL at 08:32

## 2020-03-23 RX ADMIN — MUPIROCIN: 20 OINTMENT TOPICAL at 20:10

## 2020-03-23 RX ADMIN — INSULIN LISPRO 3 UNITS: 100 INJECTION, SOLUTION INTRAVENOUS; SUBCUTANEOUS at 16:30

## 2020-03-23 RX ADMIN — ASPIRIN 325 MG ORAL TABLET 325 MG: 325 PILL ORAL at 08:32

## 2020-03-23 RX ADMIN — ENOXAPARIN SODIUM 40 MG: 40 INJECTION SUBCUTANEOUS at 08:33

## 2020-03-23 RX ADMIN — Medication 10 ML: at 08:49

## 2020-03-23 RX ADMIN — INSULIN LISPRO 12 UNITS: 100 INJECTION, SOLUTION INTRAVENOUS; SUBCUTANEOUS at 20:10

## 2020-03-23 RX ADMIN — ALPRAZOLAM 0.25 MG: 0.25 TABLET ORAL at 21:37

## 2020-03-23 ASSESSMENT — PAIN SCALES - GENERAL
PAINLEVEL_OUTOF10: 0

## 2020-03-23 NOTE — PROGRESS NOTES
normal. EOM are normal.   · Neck: Neck supple. No rigidity. No JVD present. · Cardiovascular: Normal rate, regular rhythm, S1&S2. · Pulmonary/Chest: Clear Bilateral respiratory sounds. No wheezes, No rhonchi. · Abdominal: Soft. Bowel sounds present. No distension, No tenderness. · Musculoskeletal: No tenderness. No edema    · Lymphadenopathy: Has no cervical adenopathy. · Neurological: Alert and oriented. Cranial nerve appears intact, No Gross deficit   · Skin: Skin is warm and dry. No rash noted. · Psychiatric: Has a normal behavior     Labs, diagnostic and imaging results reviewed. Reviewed. Recent Labs     03/21/20  0355 03/21/20  1132 03/22/20  0330 03/23/20  0455     --  144 142   K 3.4* 4.2 3.5 3.8     --  107 107   CO2 24  --  25 22   BUN 26*  --  26* 22*   CREATININE 0.6*  --  0.7* 0.6*     Recent Labs     03/21/20  0355 03/22/20 0330 03/23/20  0455   WBC 12.1* 12.2* 10.6   HGB 15.4 15.6 14.2   HCT 46.1 46.5 42.0   MCV 88.9 89.5 89.3    329 290     Lab Results   Component Value Date    TROPONINI 0.26 03/13/2020     Estimated Creatinine Clearance: 118 mL/min (A) (based on SCr of 0.6 mg/dL (L)).    No results found for: BNP  Lab Results   Component Value Date    PROTIME 13.2 03/13/2020    INR 1.14 03/13/2020     Lab Results   Component Value Date    TRIG 199 03/17/2020       Scheduled Meds:   loperamide  2 mg Oral Daily    furosemide  40 mg Oral Daily    aspirin  325 mg Oral Daily    carvedilol  3.125 mg Oral BID WC    insulin glargine  25 Units Subcutaneous Daily    PHENobarbital  260 mg Intramuscular Once    insulin lispro  0-18 Units Subcutaneous Q4H    sodium chloride flush  10 mL Intravenous 2 times per day    enoxaparin  40 mg Subcutaneous Daily     Continuous Infusions:   dextrose       PRN Meds:magnesium hydroxide, LORazepam, potassium chloride, sodium chloride flush, acetaminophen **OR** acetaminophen, polyethylene glycol, promethazine **OR** NSTEMI/CAD/ICM/Acute systolic CHF   Severe multi vessel CAD including acute distal RCA occlusion/infarct  Severe LM, LAD, RCA stenosis by cath and IVUS. Recommend complete revasc with CABG. LVEF 40%     Awaiting CABG. CV surgery waiting for improvement in overall condition and resolution of acute medical illnesses. Concern about post cabg recovery due to dysphagia, etoh withdrawal, encephalopathy, respiratory failure and recent inferior infarct. Cont aspirin, no statin due to elevated LFTs. Consider adding crestor low dose. - cardiomyopathy, NSVT, EF 40%   NYHA class 4 on presentation. Now class 2 symptoms. Cont coreg, start lisinopril. Cont lasix      - Acute respiratory failure     Improved. On room air now.       -Encephalopathy   Massively improved          Neuro following                 - Hypokalemia   Replace and monitor.  Consider aldactone    Everton Garber 03/23/20 10:07 AM

## 2020-03-23 NOTE — PROGRESS NOTES
[facility]    3/23/2020 10:59 AM        Name: Paige Joy . Admitted: 3/13/2020  Primary Care Provider: No primary care provider on file. (Tel: None)      Subjective:      Doing well. No new complaints. Surgery planned for tomorrow.      Reviewed interval ancillary notes    Current Medications  lisinopril (PRINIVIL;ZESTRIL) tablet 5 mg, Daily  [START ON 3/24/2020] furosemide (LASIX) tablet 20 mg, Daily  loperamide (IMODIUM) capsule 2 mg, Daily  aspirin tablet 325 mg, Daily  magnesium hydroxide (MILK OF MAGNESIA) 400 MG/5ML suspension 30 mL, Daily PRN  carvedilol (COREG) tablet 3.125 mg, BID WC  insulin glargine (LANTUS;BASAGLAR) injection pen 25 Units, Daily  PHENobarbital (LUMINAL) injection 260 mg, Once  LORazepam (ATIVAN) injection 1 mg, Q4H PRN  potassium chloride 20 mEq/50 mL IVPB (Central Line), PRN  insulin lispro (1 Unit Dial) 0-18 Units, Q4H  sodium chloride flush 0.9 % injection 10 mL, 2 times per day  sodium chloride flush 0.9 % injection 10 mL, PRN  acetaminophen (TYLENOL) tablet 650 mg, Q6H PRN    Or  acetaminophen (TYLENOL) suppository 650 mg, Q6H PRN  polyethylene glycol (GLYCOLAX) packet 17 g, Daily PRN  promethazine (PHENERGAN) tablet 12.5 mg, Q6H PRN    Or  ondansetron (ZOFRAN) injection 4 mg, Q6H PRN  enoxaparin (LOVENOX) injection 40 mg, Daily  magnesium sulfate 2 g in 50 mL IVPB premix, PRN  dextrose 50 % IV solution, PRN  dextrose 5 % solution, PRN        Objective:  /74   Pulse 72   Temp 97 °F (36.1 °C) (Temporal)   Resp 18   Ht 5' 7.5\" (1.715 m)   Wt 167 lb 1.7 oz (75.8 kg)   SpO2 94%   BMI 25.79 kg/m²     Intake/Output Summary (Last 24 hours) at 3/23/2020 1059  Last data filed at 3/23/2020 0725  Gross per 24 hour   Intake 530 ml   Output 1250 ml   Net -720 ml      Wt Readings from Last 3 Encounters:   03/23/20 167 lb 1.7 oz (75.8 kg)       General appearance: AAOx3  HEENT: atraumatic, Pupils equal, muscous membranes moist, no masses appreciated  Cardiovascular: Regular rate and rhythm no murmurs cap refill < 2sec   Respiratory: ctab  Gastrointestinal: Abdomen soft, non-tender, BS+  EXT:no edema  Neurology:following commands  Psychiatry: calm  Skin: Warm, dry, no rashes appreciated    Labs and Tests:  CBC:   Recent Labs     03/21/20  0355 03/22/20  0330 03/23/20  0455   WBC 12.1* 12.2* 10.6   HGB 15.4 15.6 14.2    329 290     BMP:    Recent Labs     03/21/20  0355 03/21/20  1132 03/22/20  0330 03/23/20  0455     --  144 142   K 3.4* 4.2 3.5 3.8     --  107 107   CO2 24  --  25 22   BUN 26*  --  26* 22*   CREATININE 0.6*  --  0.7* 0.6*   GLUCOSE 152*  --  156* 150*     Hepatic:   No results for input(s): AST, ALT, ALB, BILITOT, ALKPHOS in the last 72 hours. XR CHEST PORTABLE   Final Result   1. NG tube tip is barely in the stomach and side hole is near the GE   junction. This should be further advanced. 2. Bibasilar infiltrates and bilateral pleural effusions are unchanged. MRI BRAIN WO CONTRAST   Final Result   1. Extensive patient motion limits evaluation. 2. No convincing acute intracranial abnormality or acute infarct within the   limitations of patient motion. 3. Mild global parenchymal volume loss with chronic microvascular ischemic   changes. 4. Scattered mucosal thickening of the paranasal sinuses with bilateral   mastoid effusions. XR CHEST PORTABLE   Final Result   No significant interval change. XR CHEST PORTABLE   Final Result   1. Radiographic findings most suggestive of pulmonary edema/CHF. 2. Recommend advancing enteric tube by 5 cm. 3. Additional support devices as described above.          CTA CHEST ABDOMEN PELVIS W CONTRAST    (Results Pending)   VL DUP CAROTID BILATERAL    (Results Pending)   VL PRE OP VEIN MAPPING    (Results Pending)       Recent imaging reviewed    Problem List  Principal Problem:    NSTEMI (non-ST elevated myocardial infarction) ([facility] [address]  Active Problems:    Congestive heart failure of unknown etiology ([facility] [address]    3-vessel coronary artery disease    HTN (hypertension), benign    Uncontrolled type 2 diabetes mellitus with hyperglycemia (HCC)    Acute systolic (congestive) heart failure (HCC)    Acute respiratory failure with hypoxia (HCC)    Aspiration pneumonia ([facility] [address]    Acute encephalopathy    DM (diabetes mellitus), type 2, uncontrolled with complications (HCC)    Metabolic alkalosis    Hypernatremia    Acute delirium    Ischemic cardiomyopathy    Hypokalemia  Resolved Problems:    * No resolved hospital problems. *       Assessment & Plan:     Acute hypoxic respiratory failure secondary to new onset acute systolic heart failure . Resolved    New onset systolic HF: cath- multivessel disease. Diuresed. Now on oral lasix/ bblocker      NSTEMI:  3 vessel CAD,   - Echo-Left ventricle - normal in size and thickness, function is reduced with EF   of 40%  -angiogram- MVD; CABG  Planned tomorrow. Acute encephalopathy: MRI neg. Resolved. DM2: withhyperglcemia  - improved    - cont current regimen    Moderate Oropharyngeal Oropharyngeal Dysphagia:   - Dysphagia II Minced and Moist with Mildly Thick (Nectar) Liquids, no straws, meds crushed in puree    - speech on board           Diet: DIET DYSPHAGIA MINCED AND MOIST; Mildly Thick (Nectar);  No Drinking Straw  Dietary Nutrition Supplements: Diabetic Oral Supplement  Dietary Nutrition Supplements: Other Oral Supplement (see comment)  Code:Full Code  DVT PPXlovenox  Disposition: Cont CVU care    Chula Chavira MD   3/23/2020 10:59 AM

## 2020-03-23 NOTE — PLAN OF CARE
Pt remains free of falls. Fall risk protocol in place. Bed locked in lowest position. Call light in reach. Pt instructed to call for assistance, verbalizes understanding. Will continue to monitor.    BED ALARM ON

## 2020-03-23 NOTE — PROGRESS NOTES
Physical Therapy  Facility/Department: [facility]  Daily Treatment Note  NAME: Tangela Tinajero  : 1955  MRN: [id number]    Date of Service: 3/23/2020    Discharge Recommendations: Tangela Tinajero scored a 18/ on the AM-PAC short mobility form. Current research shows that an AM-PAC score of 17 or less is typically not associated with a discharge to the patient's home setting. Based on the patients AM-PAC score and their current functional mobility deficits, it is recommended that the patient have 5-7 sessions per week of Physical Therapy at d/c to increase the patients independence. If patient discharges prior to next session this note will serve as a discharge summary. Please see below for the latest assessment towards goals. Patient with planned CABG 3/24/20-recommendations may change    PT Equipment Recommendations  Equipment Needed: No  Other: Defer until post CABG. Assessment   Body structures, Functions, Activity limitations: Decreased functional mobility ; Decreased ADL status; Decreased endurance;Decreased strength;Decreased balance; Increased pain  Assessment: Pt able to perform all functional mobility with supervision this date. Pt able to demonstrate transfers while maintaining sternal precautions in preparation for procedure. Pt would benefit from continued skilled PT services to address deficits. Treatment Diagnosis: impaired balance  Prognosis: Good  Decision Making: Low Complexity  Clinical Presentation: stable  PT Education: Gait Training;Goals;PT Role;Plan of Care;General Safety;Transfer Training  Patient Education: Patient educated on ambulating at minimum 3x daily w/ RN staff to improve overall conditioning prior to surgery. --pt verbalizing understanding  Barriers to Learning: none  REQUIRES PT FOLLOW UP: Yes  Activity Tolerance  Activity Tolerance: Patient Tolerated treatment well     Patient Diagnosis(es): There were no encounter diagnoses.      has a past medical history of CAD (coronary artery disease), CHF (congestive heart failure) ([facility] [address] Diabetes mellitus ([facility] [address] Hyperlipidemia, and Hypertension. has a past surgical history that includes Cardiac surgery and Colonoscopy. Restrictions  Restrictions/Precautions  Restrictions/Precautions: Fall Risk(Medium fall risk)  Required Braces or Orthoses?: No  Position Activity Restriction  Other position/activity restrictions: Bob Meek is 60 y.o. male who presented with complaint of chest pain. Symptom onset was acute for a time period of 1 week. The severity is described as moderate. The course of his symptoms over time is worsening. The symptoms improved with rest and worsened with exertion. The patient's symptom is associated with SOB. Subjective   General  Chart Reviewed: Yes  Response To Previous Treatment: Patient with no complaints from previous session. Family / Caregiver Present: No  Subjective  Subjective: Pt reporting no pain at this time. Agreeable to PT session. General Comment  Comments: Pt standing in bathroom upon arrival, completing toileting. Pain Screening  Patient Currently in Pain: Denies  Vital Signs  Patient Currently in Pain: Denies       Orientation  Orientation  Overall Orientation Status: Within Normal Limits     Objective   Bed mobility  Comment: Not addressed, pt ambulating in bathroom upon arrival and seated in recliner at end of session. Transfers  Sit to Stand: Supervision  Stand to sit: Supervision  Comment: Pt performed x3 transfers from recliner simulating sternal precautions in preparation for precautions to be in place following planned procedure. Ambulation  Ambulation?: Yes  Ambulation 1  Surface: level tile  Device: Rollator  Assistance: Supervision  Quality of Gait: reciprocal gait, slight sway,  Distance: 300'  Comments: Pt able to negotiate obstacles without assist. No LOB.    Stairs/Curb  Stairs?: Yes  Stairs  # Steps : 3  Stairs Height: 6\"  Rails: Left ascending  Device: No

## 2020-03-23 NOTE — PROGRESS NOTES
Occupational Therapy  Facility/Department: [facility] CVU  Daily Treatment Note  NAME: Anisa Rey  : 1955  MRN: [id number]    Date of Service: 3/23/2020     Discharge recommendations:  Anisa Rey scored a 20/24 on the AM-PAC ADL Inpatient form. Current research shows that an AM-PAC score of 17 or less is typically not associated with a discharge to the patient's home setting. Based on the patients AM-PAC score and their current ADL deficits, it is recommended that the patient have 5-7 sessions per week of Occupational Therapy at d/c to increase the patients independence. If patient discharges prior to next session this note will serve as a discharge summary. Please see below for the latest assessment towards goals. Patient with planned CABG 3/23/20-recommendations may change     OT Equipment Recommendations  Other: CTA    Assessment   Performance deficits / Impairments: Decreased functional mobility ; Decreased strength;Decreased endurance;Decreased high-level IADLs;Decreased ADL status; Decreased safe awareness;Decreased cognition;Decreased balance  Assessment: Patient is a 58yr old male admitted for SOB requiring intubation for 8 days. Patient's PLOF is independent with all ADLs. Patient currently requires CGA- min for transfers and mobility. Patient would benefit from ongoing OT in order to increase functional status   Treatment Diagnosis: Decreased ADLs, IADLs, transfers, mobility associated with CHF  Prognosis: Good  OT Education: Transfer Training;OT Role  Patient Education: pt demonstrated understanding, discussed sternal precautions as patient prepares for CABG  Barriers to Learning: cognition   REQUIRES OT FOLLOW UP: Yes  Activity Tolerance  Activity Tolerance: Patient Tolerated treatment well  Safety Devices  Safety Devices in place: Yes  Type of devices: All fall risk precautions in place; Patient at risk for falls; Left in chair;Call light within reach;Nurse notified; Chair alarm in

## 2020-03-23 NOTE — PROGRESS NOTES
Speech  Language and Dysphagia Treatment Note    Name: Sera Roca  : 1955  Medical Diagnosis: Congestive heart failure of unknown etiology ([facility] [address] [I50.9]  Treatment Diagnosis: Moderate Oropharyngeal Oropharyngeal Dysphagia, Mild Dysarthria, Cognitive-Linguistic Deficits   Pain: Denied    Patient's response to therapy:  Pt was alert and cooperative throughout tx. Dysphagia Treatment:  Current Diet Level:   1.) Dysphagia III Soft and Bite-Sized with Mildly Thick (Nectar) Liquids, no straws, meds in puree  2.) If difficulty is noted, recommend downgrade to NPO with ongoing swallowing assessment     Tolerance of Current Diet Level: Per chart review, no documented difficulties with current diet level noted. Assessment of Texture Tolerance:  -Impressions: Pt was positioned upright in chair or RA. Pt was unable to recall rationale for current diet level. Thin liquids in isolation revealed intermittent suspected subtle throat clearing. Soft solids in combination with thin liquids revealed suspected delayed clearing with wet vocal quality and delayed cough. Soft solids in combination with Mildly Thick (Nectar) Liquids revealed no overt clinical s/s of aspiration/penetration. At this time recommend continuation of current diet level with allowance of thin water between meals.        Dysphagia Goals, addressed this date:  1.) Pt will tolerate recommended diet without s/s of aspiration (ongoing 3/23/2020)   2.) If clinical symptoms of penetration/aspiration continue to be noted,Pt will tolerate MBS to r/o aspiration and determine appropriate diet/liquid level. (ongoing 3/23/2020)   3.) Pt will tolerate diet advance to least restricted diet, as clinically indicated, with no signs of aspiration (ongoing 3/23/2020)   4.) Pt will improve oral motor function via bolus control exercises / (ongoing 3/23/2020)     Diet and Treatment Recommendations:  1.) Continue current diet- Dysphagia III Soft and Bite-Sized with mildly (nectar) thick liquids, no straws, and medications in puree  2.) If pt presents with worsening mental status, respiratory status, and/or clinical concern for aspiration/aspiration pneumonia, please hold PO intake and notify speech therapy   3.) Consider a Modified Barium Swallow Study to objectively evaluate swallowing physiology and determine the safest/least restrictive diet  4.) Allow thin water 30 minutes after a meal s/p oral care     Speech Language Treatment:  Impressions: Pt awake and alert for duration of session. Speech Language STG, addressed this date: 1. Pt will improve articulatory precision and speech intelligibility in connected speech via graded tasks to 80% (ongoing 3/23/2020)    -Speech was 100% intelligible at the level of conversation  2. Pt will improve auditory processing/comprehension of complex commands and questions to 80%, via graded tasks (ongoing 3/23/2020)    -Followed 1 step commands and functional conversation grossly WFL   3. Pt will improve orientation and short term recall via graded tasks to 80% (ongoing 3/23/2020)    -Oriented to person, place and year    -Unable to independently recall day of the week and date but able to utilize board in room    -Delayed recall with 65% accuracy   4. .Pt will participate in ongoing assessment of cognitive-linguistic skills as indicated. (ongoing 3/23/2020)    -Not targeted this date     Patient/Family Education: Discussed recommendations and POC/goals with pt and RN, who verbalized understanding. Assessment: Patient progressing toward goals. Plan: Continue as per plan of care. Discharge Recommendations: Continue speech therapy for speech-language/cognitive-linguistic tx if goals are not met at time of d/c. Treatment time  Timed Code Treatment Minutes: 10 minutes  Total Treatment time: 23 minutes    If patient discharges prior to next session this note will serve as a discharge summary.       Suni Melendez MA CCC-SLP #[id number]  Speech Language Pathologist

## 2020-03-24 ENCOUNTER — APPOINTMENT (OUTPATIENT)
Dept: GENERAL RADIOLOGY | Age: 65
DRG: 233 | End: 2020-03-24
Attending: INTERNAL MEDICINE
Payer: COMMERCIAL

## 2020-03-24 ENCOUNTER — ANESTHESIA EVENT (OUTPATIENT)
Dept: OPERATING ROOM | Age: 65
DRG: 233 | End: 2020-03-24
Payer: COMMERCIAL

## 2020-03-24 ENCOUNTER — ANESTHESIA (OUTPATIENT)
Dept: OPERATING ROOM | Age: 65
DRG: 233 | End: 2020-03-24
Payer: COMMERCIAL

## 2020-03-24 VITALS
DIASTOLIC BLOOD PRESSURE: 69 MMHG | SYSTOLIC BLOOD PRESSURE: 141 MMHG | TEMPERATURE: 97.6 F | OXYGEN SATURATION: 97 % | RESPIRATION RATE: 8 BRPM

## 2020-03-24 PROBLEM — Z95.1 S/P CORONARY ARTERY BYPASS GRAFT X 3: Status: ACTIVE | Noted: 2020-03-24

## 2020-03-24 LAB
ACTIVATED CLOTTING TIME: 116 SEC (ref 99–130)
ACTIVATED CLOTTING TIME: 125 SEC (ref 99–130)
ACTIVATED CLOTTING TIME: 457 SEC (ref 99–130)
ACTIVATED CLOTTING TIME: 472 SEC (ref 99–130)
ACTIVATED CLOTTING TIME: 527 SEC (ref 99–130)
ANION GAP SERPL CALCULATED.3IONS-SCNC: 11 MMOL/L (ref 3–16)
ANION GAP SERPL CALCULATED.3IONS-SCNC: 9 MMOL/L (ref 3–16)
BASE EXCESS ARTERIAL: -1 (ref -3–3)
BASE EXCESS ARTERIAL: 0 (ref -3–3)
BASE EXCESS ARTERIAL: 1 (ref -3–3)
BASE EXCESS ARTERIAL: 2 (ref -3–3)
BASE EXCESS ARTERIAL: 2 (ref -3–3)
BASOPHILS ABSOLUTE: 0.1 K/UL (ref 0–0.2)
BASOPHILS RELATIVE PERCENT: 0.8 %
BUN BLDV-MCNC: 15 MG/DL (ref 7–20)
BUN BLDV-MCNC: 18 MG/DL (ref 7–20)
CALCIUM IONIZED: 1.01 MMOL/L (ref 1.12–1.32)
CALCIUM IONIZED: 1.02 MMOL/L (ref 1.12–1.32)
CALCIUM IONIZED: 1.07 MMOL/L (ref 1.12–1.32)
CALCIUM IONIZED: 1.09 MMOL/L (ref 1.12–1.32)
CALCIUM IONIZED: 1.11 MMOL/L (ref 1.12–1.32)
CALCIUM IONIZED: 1.15 MMOL/L (ref 1.12–1.32)
CALCIUM IONIZED: 1.16 MMOL/L (ref 1.12–1.32)
CALCIUM IONIZED: 1.21 MMOL/L (ref 1.12–1.32)
CALCIUM IONIZED: 1.21 MMOL/L (ref 1.12–1.32)
CALCIUM IONIZED: 1.44 MMOL/L (ref 1.12–1.32)
CALCIUM SERPL-MCNC: 8.2 MG/DL (ref 8.3–10.6)
CALCIUM SERPL-MCNC: 8.9 MG/DL (ref 8.3–10.6)
CHLORIDE BLD-SCNC: 107 MMOL/L (ref 99–110)
CHLORIDE BLD-SCNC: 112 MMOL/L (ref 99–110)
CO2: 24 MMOL/L (ref 21–32)
CO2: 26 MMOL/L (ref 21–32)
CREAT SERPL-MCNC: 0.6 MG/DL (ref 0.8–1.3)
CREAT SERPL-MCNC: 0.6 MG/DL (ref 0.8–1.3)
EOSINOPHILS ABSOLUTE: 0.7 K/UL (ref 0–0.6)
EOSINOPHILS RELATIVE PERCENT: 7.5 %
GFR AFRICAN AMERICAN: >60
GFR AFRICAN AMERICAN: >60
GFR NON-AFRICAN AMERICAN: >60
GFR NON-AFRICAN AMERICAN: >60
GLUCOSE BLD-MCNC: 103 MG/DL (ref 70–99)
GLUCOSE BLD-MCNC: 116 MG/DL (ref 70–99)
GLUCOSE BLD-MCNC: 121 MG/DL (ref 70–99)
GLUCOSE BLD-MCNC: 123 MG/DL (ref 70–99)
GLUCOSE BLD-MCNC: 125 MG/DL (ref 70–99)
GLUCOSE BLD-MCNC: 125 MG/DL (ref 70–99)
GLUCOSE BLD-MCNC: 127 MG/DL (ref 70–99)
GLUCOSE BLD-MCNC: 128 MG/DL (ref 70–99)
GLUCOSE BLD-MCNC: 134 MG/DL (ref 70–99)
GLUCOSE BLD-MCNC: 135 MG/DL (ref 70–99)
GLUCOSE BLD-MCNC: 142 MG/DL (ref 70–99)
GLUCOSE BLD-MCNC: 145 MG/DL (ref 70–99)
GLUCOSE BLD-MCNC: 153 MG/DL (ref 70–99)
GLUCOSE BLD-MCNC: 171 MG/DL (ref 70–99)
GLUCOSE BLD-MCNC: 172 MG/DL (ref 70–99)
GLUCOSE BLD-MCNC: 225 MG/DL (ref 70–99)
GLUCOSE BLD-MCNC: 247 MG/DL (ref 70–99)
GLUCOSE BLD-MCNC: 99 MG/DL (ref 70–99)
HCO3 ARTERIAL: 23.6 MMOL/L (ref 21–29)
HCO3 ARTERIAL: 23.9 MMOL/L (ref 21–29)
HCO3 ARTERIAL: 24 MMOL/L (ref 21–29)
HCO3 ARTERIAL: 25.3 MMOL/L (ref 21–29)
HCO3 ARTERIAL: 25.7 MMOL/L (ref 21–29)
HCO3 ARTERIAL: 26.1 MMOL/L (ref 21–29)
HCO3 ARTERIAL: 26.3 MMOL/L (ref 21–29)
HCO3 ARTERIAL: 26.7 MMOL/L (ref 21–29)
HCO3 ARTERIAL: 27 MMOL/L (ref 21–29)
HCO3 ARTERIAL: 27.5 MMOL/L (ref 21–29)
HCT VFR BLD CALC: 41.1 % (ref 40.5–52.5)
HCT VFR BLD CALC: 42 % (ref 40.5–52.5)
HEMOGLOBIN: 10.7 GM/DL (ref 13.5–17.5)
HEMOGLOBIN: 12.1 GM/DL (ref 13.5–17.5)
HEMOGLOBIN: 12.1 GM/DL (ref 13.5–17.5)
HEMOGLOBIN: 12.3 GM/DL (ref 13.5–17.5)
HEMOGLOBIN: 12.4 GM/DL (ref 13.5–17.5)
HEMOGLOBIN: 12.6 GM/DL (ref 13.5–17.5)
HEMOGLOBIN: 12.8 GM/DL (ref 13.5–17.5)
HEMOGLOBIN: 13.9 G/DL (ref 13.5–17.5)
HEMOGLOBIN: 14 G/DL (ref 13.5–17.5)
HEMOGLOBIN: 9 GM/DL (ref 13.5–17.5)
HEMOGLOBIN: 9.1 GM/DL (ref 13.5–17.5)
HEMOGLOBIN: 9.7 GM/DL (ref 13.5–17.5)
HEPATITIS C ANTIBODY INTERPRETATION: NORMAL
LACTATE: 0.46 MMOL/L (ref 0.4–2)
LACTATE: 0.97 MMOL/L (ref 0.4–2)
LACTATE: 1.19 MMOL/L (ref 0.4–2)
LACTATE: 1.21 MMOL/L (ref 0.4–2)
LACTATE: 1.79 MMOL/L (ref 0.4–2)
LACTATE: 1.84 MMOL/L (ref 0.4–2)
LACTATE: 1.88 MMOL/L (ref 0.4–2)
LYMPHOCYTES ABSOLUTE: 2.2 K/UL (ref 1–5.1)
LYMPHOCYTES RELATIVE PERCENT: 21.7 %
MAGNESIUM: 3 MG/DL (ref 1.8–2.4)
MCH RBC QN AUTO: 29.8 PG (ref 26–34)
MCH RBC QN AUTO: 30.2 PG (ref 26–34)
MCHC RBC AUTO-ENTMCNC: 33.2 G/DL (ref 31–36)
MCHC RBC AUTO-ENTMCNC: 34.1 G/DL (ref 31–36)
MCV RBC AUTO: 88.6 FL (ref 80–100)
MCV RBC AUTO: 89.8 FL (ref 80–100)
MONOCYTES ABSOLUTE: 0.9 K/UL (ref 0–1.3)
MONOCYTES RELATIVE PERCENT: 9 %
NEUTROPHILS ABSOLUTE: 6.1 K/UL (ref 1.7–7.7)
NEUTROPHILS RELATIVE PERCENT: 61 %
O2 SAT, ARTERIAL: 100 % (ref 93–100)
O2 SAT, ARTERIAL: 97 % (ref 93–100)
O2 SAT, ARTERIAL: 97 % (ref 93–100)
O2 SAT, ARTERIAL: 99 % (ref 93–100)
O2 SAT, ARTERIAL: 99 % (ref 93–100)
PCO2 ARTERIAL: 38.7 MM HG (ref 35–45)
PCO2 ARTERIAL: 38.9 MM HG (ref 35–45)
PCO2 ARTERIAL: 39.3 MM HG (ref 35–45)
PCO2 ARTERIAL: 39.4 MM HG (ref 35–45)
PCO2 ARTERIAL: 41 MM HG (ref 35–45)
PCO2 ARTERIAL: 41.9 MM HG (ref 35–45)
PCO2 ARTERIAL: 45.1 MM HG (ref 35–45)
PCO2 ARTERIAL: 45.8 MM HG (ref 35–45)
PCO2 ARTERIAL: 50.4 MM HG (ref 35–45)
PCO2 ARTERIAL: 55 MM HG (ref 35–45)
PDW BLD-RTO: 12.8 % (ref 12.4–15.4)
PDW BLD-RTO: 12.8 % (ref 12.4–15.4)
PERFORMED ON: ABNORMAL
PERFORMED ON: NORMAL
PH ARTERIAL: 7.31 (ref 7.35–7.45)
PH ARTERIAL: 7.34 (ref 7.35–7.45)
PH ARTERIAL: 7.35 (ref 7.35–7.45)
PH ARTERIAL: 7.38 (ref 7.35–7.45)
PH ARTERIAL: 7.38 (ref 7.35–7.45)
PH ARTERIAL: 7.39 (ref 7.35–7.45)
PH ARTERIAL: 7.4 (ref 7.35–7.45)
PH ARTERIAL: 7.4 (ref 7.35–7.45)
PH ARTERIAL: 7.42 (ref 7.35–7.45)
PH ARTERIAL: 7.43 (ref 7.35–7.45)
PLATELET # BLD: 222 K/UL (ref 135–450)
PLATELET # BLD: 309 K/UL (ref 135–450)
PMV BLD AUTO: 9 FL (ref 5–10.5)
PMV BLD AUTO: 9.2 FL (ref 5–10.5)
PO2 ARTERIAL: 128.1 MM HG (ref 75–108)
PO2 ARTERIAL: 133.3 MM HG (ref 75–108)
PO2 ARTERIAL: 166.3 MM HG (ref 75–108)
PO2 ARTERIAL: 298.6 MM HG (ref 75–108)
PO2 ARTERIAL: 328.2 MM HG (ref 75–108)
PO2 ARTERIAL: 422.2 MM HG (ref 75–108)
PO2 ARTERIAL: 474.2 MM HG (ref 75–108)
PO2 ARTERIAL: 523.9 MM HG (ref 75–108)
PO2 ARTERIAL: 89.6 MM HG (ref 75–108)
PO2 ARTERIAL: 90.2 MM HG (ref 75–108)
POC HEMATOCRIT: 26 % (ref 40.5–52.5)
POC HEMATOCRIT: 27 % (ref 40.5–52.5)
POC HEMATOCRIT: 28 % (ref 40.5–52.5)
POC HEMATOCRIT: 31 % (ref 40.5–52.5)
POC HEMATOCRIT: 36 % (ref 40.5–52.5)
POC HEMATOCRIT: 37 % (ref 40.5–52.5)
POC HEMATOCRIT: 37 % (ref 40.5–52.5)
POC HEMATOCRIT: 38 % (ref 40.5–52.5)
POC POTASSIUM: 3.6 MMOL/L (ref 3.5–5.1)
POC POTASSIUM: 3.9 MMOL/L (ref 3.5–5.1)
POC POTASSIUM: 3.9 MMOL/L (ref 3.5–5.1)
POC POTASSIUM: 4 MMOL/L (ref 3.5–5.1)
POC POTASSIUM: 4.3 MMOL/L (ref 3.5–5.1)
POC POTASSIUM: 4.4 MMOL/L (ref 3.5–5.1)
POC POTASSIUM: 5.1 MMOL/L (ref 3.5–5.1)
POC SAMPLE TYPE: ABNORMAL
POC SODIUM: 139 MMOL/L (ref 136–145)
POC SODIUM: 141 MMOL/L (ref 136–145)
POC SODIUM: 141 MMOL/L (ref 136–145)
POC SODIUM: 142 MMOL/L (ref 136–145)
POC SODIUM: 143 MMOL/L (ref 136–145)
POC SODIUM: 144 MMOL/L (ref 136–145)
POC SODIUM: 146 MMOL/L (ref 136–145)
POC SODIUM: 147 MMOL/L (ref 136–145)
POTASSIUM REFLEX MAGNESIUM: 3.9 MMOL/L (ref 3.5–5.1)
POTASSIUM SERPL-SCNC: 3.8 MMOL/L (ref 3.5–5.1)
RBC # BLD: 4.64 M/UL (ref 4.2–5.9)
RBC # BLD: 4.68 M/UL (ref 4.2–5.9)
SODIUM BLD-SCNC: 144 MMOL/L (ref 136–145)
SODIUM BLD-SCNC: 145 MMOL/L (ref 136–145)
TCO2 ARTERIAL: 25 MMOL/L
TCO2 ARTERIAL: 27 MMOL/L
TCO2 ARTERIAL: 28 MMOL/L
TCO2 ARTERIAL: 28 MMOL/L
TCO2 ARTERIAL: 29 MMOL/L
TCO2 ARTERIAL: 29 MMOL/L
WBC # BLD: 22.9 K/UL (ref 4–11)
WBC # BLD: 9.9 K/UL (ref 4–11)

## 2020-03-24 PROCEDURE — 2780000010 HC IMPLANT OTHER: Performed by: THORACIC SURGERY (CARDIOTHORACIC VASCULAR SURGERY)

## 2020-03-24 PROCEDURE — 2700000000 HC OXYGEN THERAPY PER DAY

## 2020-03-24 PROCEDURE — C1729 CATH, DRAINAGE: HCPCS | Performed by: THORACIC SURGERY (CARDIOTHORACIC VASCULAR SURGERY)

## 2020-03-24 PROCEDURE — 84295 ASSAY OF SERUM SODIUM: CPT

## 2020-03-24 PROCEDURE — 2000000000 HC ICU R&B

## 2020-03-24 PROCEDURE — 2580000003 HC RX 258: Performed by: THORACIC SURGERY (CARDIOTHORACIC VASCULAR SURGERY)

## 2020-03-24 PROCEDURE — 2580000003 HC RX 258: Performed by: INTERNAL MEDICINE

## 2020-03-24 PROCEDURE — 2500000003 HC RX 250 WO HCPCS: Performed by: THORACIC SURGERY (CARDIOTHORACIC VASCULAR SURGERY)

## 2020-03-24 PROCEDURE — 33518 CABG ARTERY-VEIN TWO: CPT | Performed by: THORACIC SURGERY (CARDIOTHORACIC VASCULAR SURGERY)

## 2020-03-24 PROCEDURE — 33533 CABG ARTERIAL SINGLE: CPT | Performed by: PHYSICIAN ASSISTANT

## 2020-03-24 PROCEDURE — 06BQ4ZZ EXCISION OF LEFT SAPHENOUS VEIN, PERCUTANEOUS ENDOSCOPIC APPROACH: ICD-10-PCS | Performed by: THORACIC SURGERY (CARDIOTHORACIC VASCULAR SURGERY)

## 2020-03-24 PROCEDURE — P9045 ALBUMIN (HUMAN), 5%, 250 ML: HCPCS | Performed by: THORACIC SURGERY (CARDIOTHORACIC VASCULAR SURGERY)

## 2020-03-24 PROCEDURE — 2580000003 HC RX 258: Performed by: NURSE PRACTITIONER

## 2020-03-24 PROCEDURE — 021109W BYPASS CORONARY ARTERY, TWO ARTERIES FROM AORTA WITH AUTOLOGOUS VENOUS TISSUE, OPEN APPROACH: ICD-10-PCS | Performed by: THORACIC SURGERY (CARDIOTHORACIC VASCULAR SURGERY)

## 2020-03-24 PROCEDURE — 84132 ASSAY OF SERUM POTASSIUM: CPT

## 2020-03-24 PROCEDURE — 85014 HEMATOCRIT: CPT

## 2020-03-24 PROCEDURE — 3700000001 HC ADD 15 MINUTES (ANESTHESIA): Performed by: THORACIC SURGERY (CARDIOTHORACIC VASCULAR SURGERY)

## 2020-03-24 PROCEDURE — 6360000002 HC RX W HCPCS: Performed by: THORACIC SURGERY (CARDIOTHORACIC VASCULAR SURGERY)

## 2020-03-24 PROCEDURE — 6370000000 HC RX 637 (ALT 250 FOR IP): Performed by: THORACIC SURGERY (CARDIOTHORACIC VASCULAR SURGERY)

## 2020-03-24 PROCEDURE — 02100Z9 BYPASS CORONARY ARTERY, ONE ARTERY FROM LEFT INTERNAL MAMMARY, OPEN APPROACH: ICD-10-PCS | Performed by: THORACIC SURGERY (CARDIOTHORACIC VASCULAR SURGERY)

## 2020-03-24 PROCEDURE — 71045 X-RAY EXAM CHEST 1 VIEW: CPT

## 2020-03-24 PROCEDURE — C1751 CATH, INF, PER/CENT/MIDLINE: HCPCS | Performed by: THORACIC SURGERY (CARDIOTHORACIC VASCULAR SURGERY)

## 2020-03-24 PROCEDURE — 33508 ENDOSCOPIC VEIN HARVEST: CPT | Performed by: PHYSICIAN ASSISTANT

## 2020-03-24 PROCEDURE — 3600000008 HC SURGERY OHS BASE: Performed by: THORACIC SURGERY (CARDIOTHORACIC VASCULAR SURGERY)

## 2020-03-24 PROCEDURE — 82330 ASSAY OF CALCIUM: CPT

## 2020-03-24 PROCEDURE — 80048 BASIC METABOLIC PNL TOTAL CA: CPT

## 2020-03-24 PROCEDURE — 33533 CABG ARTERIAL SINGLE: CPT | Performed by: THORACIC SURGERY (CARDIOTHORACIC VASCULAR SURGERY)

## 2020-03-24 PROCEDURE — 2100000000 HC CCU R&B

## 2020-03-24 PROCEDURE — 86803 HEPATITIS C AB TEST: CPT

## 2020-03-24 PROCEDURE — 82803 BLOOD GASES ANY COMBINATION: CPT

## 2020-03-24 PROCEDURE — 3700000000 HC ANESTHESIA ATTENDED CARE: Performed by: THORACIC SURGERY (CARDIOTHORACIC VASCULAR SURGERY)

## 2020-03-24 PROCEDURE — 82947 ASSAY GLUCOSE BLOOD QUANT: CPT

## 2020-03-24 PROCEDURE — 2580000003 HC RX 258: Performed by: ANESTHESIOLOGY

## 2020-03-24 PROCEDURE — 5A1221Z PERFORMANCE OF CARDIAC OUTPUT, CONTINUOUS: ICD-10-PCS | Performed by: THORACIC SURGERY (CARDIOTHORACIC VASCULAR SURGERY)

## 2020-03-24 PROCEDURE — 6370000000 HC RX 637 (ALT 250 FOR IP): Performed by: ANESTHESIOLOGY

## 2020-03-24 PROCEDURE — 02L70CK OCCLUSION OF LEFT ATRIAL APPENDAGE WITH EXTRALUMINAL DEVICE, OPEN APPROACH: ICD-10-PCS | Performed by: THORACIC SURGERY (CARDIOTHORACIC VASCULAR SURGERY)

## 2020-03-24 PROCEDURE — 83735 ASSAY OF MAGNESIUM: CPT

## 2020-03-24 PROCEDURE — P9045 ALBUMIN (HUMAN), 5%, 250 ML: HCPCS | Performed by: ANESTHESIOLOGY

## 2020-03-24 PROCEDURE — 33518 CABG ARTERY-VEIN TWO: CPT | Performed by: PHYSICIAN ASSISTANT

## 2020-03-24 PROCEDURE — 6370000000 HC RX 637 (ALT 250 FOR IP): Performed by: INTERNAL MEDICINE

## 2020-03-24 PROCEDURE — 3600000018 HC SURGERY OHS ADDTL 15MIN: Performed by: THORACIC SURGERY (CARDIOTHORACIC VASCULAR SURGERY)

## 2020-03-24 PROCEDURE — 36592 COLLECT BLOOD FROM PICC: CPT

## 2020-03-24 PROCEDURE — 2500000003 HC RX 250 WO HCPCS: Performed by: ANESTHESIOLOGY

## 2020-03-24 PROCEDURE — C9113 INJ PANTOPRAZOLE SODIUM, VIA: HCPCS | Performed by: THORACIC SURGERY (CARDIOTHORACIC VASCULAR SURGERY)

## 2020-03-24 PROCEDURE — 94761 N-INVAS EAR/PLS OXIMETRY MLT: CPT

## 2020-03-24 PROCEDURE — 83605 ASSAY OF LACTIC ACID: CPT

## 2020-03-24 PROCEDURE — 2720000010 HC SURG SUPPLY STERILE: Performed by: THORACIC SURGERY (CARDIOTHORACIC VASCULAR SURGERY)

## 2020-03-24 PROCEDURE — 6360000002 HC RX W HCPCS: Performed by: INTERNAL MEDICINE

## 2020-03-24 PROCEDURE — 94002 VENT MGMT INPAT INIT DAY: CPT

## 2020-03-24 PROCEDURE — 36415 COLL VENOUS BLD VENIPUNCTURE: CPT

## 2020-03-24 PROCEDURE — 85025 COMPLETE CBC W/AUTO DIFF WBC: CPT

## 2020-03-24 PROCEDURE — 2709999900 HC NON-CHARGEABLE SUPPLY: Performed by: THORACIC SURGERY (CARDIOTHORACIC VASCULAR SURGERY)

## 2020-03-24 PROCEDURE — 85027 COMPLETE CBC AUTOMATED: CPT

## 2020-03-24 PROCEDURE — 85347 COAGULATION TIME ACTIVATED: CPT

## 2020-03-24 PROCEDURE — 6360000002 HC RX W HCPCS: Performed by: NURSE PRACTITIONER

## 2020-03-24 PROCEDURE — 6360000002 HC RX W HCPCS: Performed by: ANESTHESIOLOGY

## 2020-03-24 PROCEDURE — 33508 ENDOSCOPIC VEIN HARVEST: CPT | Performed by: THORACIC SURGERY (CARDIOTHORACIC VASCULAR SURGERY)

## 2020-03-24 PROCEDURE — 6370000000 HC RX 637 (ALT 250 FOR IP)

## 2020-03-24 PROCEDURE — C9113 INJ PANTOPRAZOLE SODIUM, VIA: HCPCS | Performed by: NURSE PRACTITIONER

## 2020-03-24 DEVICE — ZINACTIVE USE 2540324 DEVICE OCCL CLP L45MM SM FOOTPRINT 1 HND APPL SUTURELESS W/: Type: IMPLANTABLE DEVICE | Status: FUNCTIONAL

## 2020-03-24 RX ORDER — MILRINONE LACTATE 0.2 MG/ML
0.38 INJECTION, SOLUTION INTRAVENOUS CONTINUOUS PRN
Status: DISCONTINUED | OUTPATIENT
Start: 2020-03-24 | End: 2020-03-25

## 2020-03-24 RX ORDER — ONDANSETRON 2 MG/ML
4 INJECTION INTRAMUSCULAR; INTRAVENOUS EVERY 6 HOURS PRN
Status: DISCONTINUED | OUTPATIENT
Start: 2020-03-24 | End: 2020-03-29 | Stop reason: HOSPADM

## 2020-03-24 RX ORDER — INSULIN LISPRO 100 [IU]/ML
0.08 INJECTION, SOLUTION INTRAVENOUS; SUBCUTANEOUS
Status: DISCONTINUED | OUTPATIENT
Start: 2020-03-26 | End: 2020-03-29 | Stop reason: HOSPADM

## 2020-03-24 RX ORDER — VECURONIUM BROMIDE 1 MG/ML
INJECTION, POWDER, LYOPHILIZED, FOR SOLUTION INTRAVENOUS PRN
Status: DISCONTINUED | OUTPATIENT
Start: 2020-03-24 | End: 2020-03-24 | Stop reason: SDUPTHER

## 2020-03-24 RX ORDER — MAGNESIUM HYDROXIDE 1200 MG/15ML
LIQUID ORAL CONTINUOUS PRN
Status: COMPLETED | OUTPATIENT
Start: 2020-03-24 | End: 2020-03-24

## 2020-03-24 RX ORDER — ZOLPIDEM TARTRATE 5 MG/1
5 TABLET ORAL NIGHTLY PRN
Status: DISCONTINUED | OUTPATIENT
Start: 2020-03-25 | End: 2020-03-29 | Stop reason: HOSPADM

## 2020-03-24 RX ORDER — FENTANYL CITRATE 50 UG/ML
INJECTION, SOLUTION INTRAMUSCULAR; INTRAVENOUS PRN
Status: DISCONTINUED | OUTPATIENT
Start: 2020-03-24 | End: 2020-03-24 | Stop reason: SDUPTHER

## 2020-03-24 RX ORDER — PROPOFOL 10 MG/ML
INJECTION, EMULSION INTRAVENOUS PRN
Status: DISCONTINUED | OUTPATIENT
Start: 2020-03-24 | End: 2020-03-24 | Stop reason: SDUPTHER

## 2020-03-24 RX ORDER — CEFAZOLIN SODIUM 1 G/3ML
INJECTION, POWDER, FOR SOLUTION INTRAMUSCULAR; INTRAVENOUS PRN
Status: DISCONTINUED | OUTPATIENT
Start: 2020-03-24 | End: 2020-03-24 | Stop reason: SDUPTHER

## 2020-03-24 RX ORDER — POTASSIUM CHLORIDE 29.8 MG/ML
20 INJECTION INTRAVENOUS PRN
Status: DISCONTINUED | OUTPATIENT
Start: 2020-03-24 | End: 2020-03-29 | Stop reason: HOSPADM

## 2020-03-24 RX ORDER — SODIUM CHLORIDE, SODIUM LACTATE, POTASSIUM CHLORIDE, AND CALCIUM CHLORIDE .6; .31; .03; .02 G/100ML; G/100ML; G/100ML; G/100ML
250 INJECTION, SOLUTION INTRAVENOUS CONTINUOUS PRN
Status: DISCONTINUED | OUTPATIENT
Start: 2020-03-24 | End: 2020-03-25

## 2020-03-24 RX ORDER — NITROGLYCERIN 5 MG/ML
INJECTION, SOLUTION INTRAVENOUS PRN
Status: DISCONTINUED | OUTPATIENT
Start: 2020-03-24 | End: 2020-03-24 | Stop reason: SDUPTHER

## 2020-03-24 RX ORDER — INSULIN LISPRO 100 [IU]/ML
0-12 INJECTION, SOLUTION INTRAVENOUS; SUBCUTANEOUS
Status: DISCONTINUED | OUTPATIENT
Start: 2020-03-24 | End: 2020-03-29 | Stop reason: HOSPADM

## 2020-03-24 RX ORDER — NITROGLYCERIN 20 MG/100ML
10 INJECTION INTRAVENOUS CONTINUOUS PRN
Status: DISCONTINUED | OUTPATIENT
Start: 2020-03-24 | End: 2020-03-25

## 2020-03-24 RX ORDER — DEXTROSE MONOHYDRATE 25 G/50ML
12.5 INJECTION, SOLUTION INTRAVENOUS PRN
Status: DISCONTINUED | OUTPATIENT
Start: 2020-03-24 | End: 2020-03-29 | Stop reason: HOSPADM

## 2020-03-24 RX ORDER — ALBUTEROL SULFATE 90 UG/1
2 AEROSOL, METERED RESPIRATORY (INHALATION) EVERY 6 HOURS PRN
Status: DISCONTINUED | OUTPATIENT
Start: 2020-03-24 | End: 2020-03-29 | Stop reason: HOSPADM

## 2020-03-24 RX ORDER — SENNA AND DOCUSATE SODIUM 50; 8.6 MG/1; MG/1
1 TABLET, FILM COATED ORAL 2 TIMES DAILY
Status: DISCONTINUED | OUTPATIENT
Start: 2020-03-24 | End: 2020-03-29 | Stop reason: HOSPADM

## 2020-03-24 RX ORDER — METOCLOPRAMIDE HYDROCHLORIDE 5 MG/ML
10 INJECTION INTRAMUSCULAR; INTRAVENOUS EVERY 6 HOURS PRN
Status: DISCONTINUED | OUTPATIENT
Start: 2020-03-24 | End: 2020-03-29 | Stop reason: HOSPADM

## 2020-03-24 RX ORDER — DOBUTAMINE HYDROCHLORIDE 200 MG/100ML
2 INJECTION INTRAVENOUS CONTINUOUS PRN
Status: DISCONTINUED | OUTPATIENT
Start: 2020-03-24 | End: 2020-03-25

## 2020-03-24 RX ORDER — NICOTINE POLACRILEX 4 MG
15 LOZENGE BUCCAL PRN
Status: DISCONTINUED | OUTPATIENT
Start: 2020-03-24 | End: 2020-03-29 | Stop reason: HOSPADM

## 2020-03-24 RX ORDER — SODIUM CHLORIDE, SODIUM LACTATE, POTASSIUM CHLORIDE, CALCIUM CHLORIDE 600; 310; 30; 20 MG/100ML; MG/100ML; MG/100ML; MG/100ML
INJECTION, SOLUTION INTRAVENOUS CONTINUOUS PRN
Status: DISCONTINUED | OUTPATIENT
Start: 2020-03-24 | End: 2020-03-24 | Stop reason: SDUPTHER

## 2020-03-24 RX ORDER — SODIUM CHLORIDE 9 MG/ML
INJECTION, SOLUTION INTRAVENOUS CONTINUOUS PRN
Status: DISCONTINUED | OUTPATIENT
Start: 2020-03-24 | End: 2020-03-24 | Stop reason: SDUPTHER

## 2020-03-24 RX ORDER — DOPAMINE HYDROCHLORIDE 160 MG/100ML
2 INJECTION, SOLUTION INTRAVENOUS CONTINUOUS PRN
Status: DISCONTINUED | OUTPATIENT
Start: 2020-03-24 | End: 2020-03-26

## 2020-03-24 RX ORDER — MEPERIDINE HYDROCHLORIDE 50 MG/ML
25 INJECTION INTRAMUSCULAR; INTRAVENOUS; SUBCUTANEOUS
Status: ACTIVE | OUTPATIENT
Start: 2020-03-24 | End: 2020-03-24

## 2020-03-24 RX ORDER — METOPROLOL TARTRATE 5 MG/5ML
INJECTION INTRAVENOUS PRN
Status: DISCONTINUED | OUTPATIENT
Start: 2020-03-24 | End: 2020-03-24 | Stop reason: SDUPTHER

## 2020-03-24 RX ORDER — POTASSIUM CHLORIDE 750 MG/1
10 TABLET, FILM COATED, EXTENDED RELEASE ORAL
Status: DISCONTINUED | OUTPATIENT
Start: 2020-03-25 | End: 2020-03-29 | Stop reason: HOSPADM

## 2020-03-24 RX ORDER — SODIUM CHLORIDE 0.9 % (FLUSH) 0.9 %
10 SYRINGE (ML) INJECTION PRN
Status: DISCONTINUED | OUTPATIENT
Start: 2020-03-24 | End: 2020-03-29 | Stop reason: HOSPADM

## 2020-03-24 RX ORDER — CALCIUM CHLORIDE 100 MG/ML
INJECTION INTRAVENOUS; INTRAVENTRICULAR
Status: COMPLETED | OUTPATIENT
Start: 2020-03-24 | End: 2020-03-24

## 2020-03-24 RX ORDER — MIDAZOLAM HYDROCHLORIDE 5 MG/ML
INJECTION INTRAMUSCULAR; INTRAVENOUS PRN
Status: DISCONTINUED | OUTPATIENT
Start: 2020-03-24 | End: 2020-03-24 | Stop reason: SDUPTHER

## 2020-03-24 RX ORDER — EPHEDRINE SULFATE/0.9% NACL/PF 50 MG/5 ML
SYRINGE (ML) INTRAVENOUS PRN
Status: DISCONTINUED | OUTPATIENT
Start: 2020-03-24 | End: 2020-03-24 | Stop reason: SDUPTHER

## 2020-03-24 RX ORDER — INSULIN LISPRO 100 [IU]/ML
0-6 INJECTION, SOLUTION INTRAVENOUS; SUBCUTANEOUS NIGHTLY
Status: DISCONTINUED | OUTPATIENT
Start: 2020-03-24 | End: 2020-03-29 | Stop reason: HOSPADM

## 2020-03-24 RX ORDER — PROPOFOL 10 MG/ML
10 INJECTION, EMULSION INTRAVENOUS
Status: DISCONTINUED | OUTPATIENT
Start: 2020-03-24 | End: 2020-03-25

## 2020-03-24 RX ORDER — DEXTROSE, SODIUM CHLORIDE, AND POTASSIUM CHLORIDE 5; .45; .15 G/100ML; G/100ML; G/100ML
1000 INJECTION INTRAVENOUS CONTINUOUS
Status: DISCONTINUED | OUTPATIENT
Start: 2020-03-24 | End: 2020-03-25

## 2020-03-24 RX ORDER — LOSARTAN POTASSIUM 25 MG/1
25 TABLET ORAL
Status: DISCONTINUED | OUTPATIENT
Start: 2020-03-27 | End: 2020-03-29 | Stop reason: HOSPADM

## 2020-03-24 RX ORDER — VASOPRESSIN 20 U/ML
INJECTION PARENTERAL PRN
Status: DISCONTINUED | OUTPATIENT
Start: 2020-03-24 | End: 2020-03-24 | Stop reason: SDUPTHER

## 2020-03-24 RX ORDER — EPINEPHRINE 1 MG/ML
INJECTION, SOLUTION, CONCENTRATE INTRAVENOUS PRN
Status: DISCONTINUED | OUTPATIENT
Start: 2020-03-24 | End: 2020-03-24 | Stop reason: SDUPTHER

## 2020-03-24 RX ORDER — ALBUMIN, HUMAN INJ 5% 5 %
25 SOLUTION INTRAVENOUS PRN
Status: DISCONTINUED | OUTPATIENT
Start: 2020-03-24 | End: 2020-03-29 | Stop reason: HOSPADM

## 2020-03-24 RX ORDER — ACETAMINOPHEN 500 MG
1000 TABLET ORAL EVERY 6 HOURS
Status: DISCONTINUED | OUTPATIENT
Start: 2020-03-24 | End: 2020-03-29 | Stop reason: HOSPADM

## 2020-03-24 RX ORDER — MAGNESIUM SULFATE IN WATER 40 MG/ML
2 INJECTION, SOLUTION INTRAVENOUS PRN
Status: DISCONTINUED | OUTPATIENT
Start: 2020-03-24 | End: 2020-03-25

## 2020-03-24 RX ORDER — BUPIVACAINE HYDROCHLORIDE 2.5 MG/ML
INJECTION, SOLUTION EPIDURAL; INFILTRATION; INTRACAUDAL
Status: COMPLETED | OUTPATIENT
Start: 2020-03-24 | End: 2020-03-24

## 2020-03-24 RX ORDER — ONDANSETRON 2 MG/ML
INJECTION INTRAMUSCULAR; INTRAVENOUS PRN
Status: DISCONTINUED | OUTPATIENT
Start: 2020-03-24 | End: 2020-03-24 | Stop reason: SDUPTHER

## 2020-03-24 RX ORDER — DIPHENHYDRAMINE HCL 25 MG
25 TABLET ORAL NIGHTLY PRN
Status: DISCONTINUED | OUTPATIENT
Start: 2020-03-25 | End: 2020-03-29 | Stop reason: HOSPADM

## 2020-03-24 RX ORDER — FUROSEMIDE 10 MG/ML
20 INJECTION INTRAMUSCULAR; INTRAVENOUS PRN
Status: DISCONTINUED | OUTPATIENT
Start: 2020-03-24 | End: 2020-03-29 | Stop reason: HOSPADM

## 2020-03-24 RX ORDER — ALBUMIN, HUMAN INJ 5% 5 %
SOLUTION INTRAVENOUS PRN
Status: DISCONTINUED | OUTPATIENT
Start: 2020-03-24 | End: 2020-03-24 | Stop reason: SDUPTHER

## 2020-03-24 RX ORDER — ASPIRIN 300 MG/1
300 SUPPOSITORY RECTAL ONCE
Status: DISCONTINUED | OUTPATIENT
Start: 2020-03-24 | End: 2020-03-25

## 2020-03-24 RX ORDER — OXYCODONE HYDROCHLORIDE 5 MG/1
5 TABLET ORAL EVERY 4 HOURS PRN
Status: DISCONTINUED | OUTPATIENT
Start: 2020-03-24 | End: 2020-03-29 | Stop reason: HOSPADM

## 2020-03-24 RX ORDER — POTASSIUM CHLORIDE 29.8 MG/ML
INJECTION INTRAVENOUS
Status: DISCONTINUED
Start: 2020-03-24 | End: 2020-03-24

## 2020-03-24 RX ORDER — MORPHINE SULFATE 10 MG/ML
INJECTION, SOLUTION INTRAMUSCULAR; INTRAVENOUS PRN
Status: DISCONTINUED | OUTPATIENT
Start: 2020-03-24 | End: 2020-03-24 | Stop reason: SDUPTHER

## 2020-03-24 RX ORDER — MORPHINE SULFATE 2 MG/ML
2 INJECTION, SOLUTION INTRAMUSCULAR; INTRAVENOUS
Status: DISCONTINUED | OUTPATIENT
Start: 2020-03-24 | End: 2020-03-27

## 2020-03-24 RX ORDER — FUROSEMIDE 10 MG/ML
20 INJECTION INTRAMUSCULAR; INTRAVENOUS 2 TIMES DAILY
Status: DISCONTINUED | OUTPATIENT
Start: 2020-03-25 | End: 2020-03-29 | Stop reason: HOSPADM

## 2020-03-24 RX ORDER — NITROGLYCERIN 40 MG/1
1 PATCH TRANSDERMAL DAILY
Status: DISPENSED | OUTPATIENT
Start: 2020-03-25 | End: 2020-03-28

## 2020-03-24 RX ORDER — POLYETHYLENE GLYCOL 3350 17 G/17G
17 POWDER, FOR SOLUTION ORAL DAILY PRN
Status: DISCONTINUED | OUTPATIENT
Start: 2020-03-24 | End: 2020-03-29 | Stop reason: HOSPADM

## 2020-03-24 RX ORDER — OXYCODONE HYDROCHLORIDE 5 MG/1
10 TABLET ORAL EVERY 4 HOURS PRN
Status: DISCONTINUED | OUTPATIENT
Start: 2020-03-24 | End: 2020-03-29 | Stop reason: HOSPADM

## 2020-03-24 RX ORDER — FONDAPARINUX SODIUM 2.5 MG/.5ML
2.5 INJECTION SUBCUTANEOUS DAILY
Status: DISCONTINUED | OUTPATIENT
Start: 2020-03-25 | End: 2020-03-29 | Stop reason: HOSPADM

## 2020-03-24 RX ORDER — SODIUM CHLORIDE 9 MG/ML
10 INJECTION INTRAVENOUS DAILY
Status: DISCONTINUED | OUTPATIENT
Start: 2020-03-24 | End: 2020-03-25

## 2020-03-24 RX ORDER — SODIUM CHLORIDE 0.9 % (FLUSH) 0.9 %
10 SYRINGE (ML) INJECTION EVERY 12 HOURS SCHEDULED
Status: DISCONTINUED | OUTPATIENT
Start: 2020-03-24 | End: 2020-03-29 | Stop reason: HOSPADM

## 2020-03-24 RX ORDER — DEXTROSE MONOHYDRATE 50 MG/ML
100 INJECTION, SOLUTION INTRAVENOUS PRN
Status: DISCONTINUED | OUTPATIENT
Start: 2020-03-24 | End: 2020-03-29 | Stop reason: HOSPADM

## 2020-03-24 RX ORDER — DIPHENHYDRAMINE HYDROCHLORIDE 50 MG/ML
INJECTION INTRAMUSCULAR; INTRAVENOUS PRN
Status: DISCONTINUED | OUTPATIENT
Start: 2020-03-24 | End: 2020-03-24 | Stop reason: SDUPTHER

## 2020-03-24 RX ORDER — PANTOPRAZOLE SODIUM 40 MG/10ML
40 INJECTION, POWDER, LYOPHILIZED, FOR SOLUTION INTRAVENOUS DAILY
Status: DISCONTINUED | OUTPATIENT
Start: 2020-03-24 | End: 2020-03-25

## 2020-03-24 RX ORDER — LANOLIN ALCOHOL/MO/W.PET/CERES
400 CREAM (GRAM) TOPICAL 2 TIMES DAILY
Status: DISCONTINUED | OUTPATIENT
Start: 2020-03-25 | End: 2020-03-29 | Stop reason: HOSPADM

## 2020-03-24 RX ORDER — DOPAMINE HYDROCHLORIDE 160 MG/100ML
INJECTION, SOLUTION INTRAVENOUS CONTINUOUS PRN
Status: DISCONTINUED | OUTPATIENT
Start: 2020-03-24 | End: 2020-03-24 | Stop reason: SDUPTHER

## 2020-03-24 RX ORDER — DEXTROSE, SODIUM CHLORIDE, AND POTASSIUM CHLORIDE 5; .45; .15 G/100ML; G/100ML; G/100ML
INJECTION INTRAVENOUS
Status: DISCONTINUED
Start: 2020-03-24 | End: 2020-03-24

## 2020-03-24 RX ORDER — NITROGLYCERIN 20 MG/100ML
INJECTION INTRAVENOUS CONTINUOUS PRN
Status: DISCONTINUED | OUTPATIENT
Start: 2020-03-24 | End: 2020-03-24 | Stop reason: SDUPTHER

## 2020-03-24 RX ORDER — ALBUMIN, HUMAN INJ 5% 5 %
SOLUTION INTRAVENOUS
Status: COMPLETED
Start: 2020-03-24 | End: 2020-03-24

## 2020-03-24 RX ORDER — PROTAMINE SULFATE 10 MG/ML
50 INJECTION, SOLUTION INTRAVENOUS
Status: DISPENSED | OUTPATIENT
Start: 2020-03-24 | End: 2020-03-24

## 2020-03-24 RX ORDER — KETAMINE HCL IN NACL, ISO-OSM 100MG/10ML
SYRINGE (ML) INJECTION PRN
Status: DISCONTINUED | OUTPATIENT
Start: 2020-03-24 | End: 2020-03-24 | Stop reason: SDUPTHER

## 2020-03-24 RX ORDER — PANTOPRAZOLE SODIUM 40 MG/1
40 TABLET, DELAYED RELEASE ORAL DAILY
Status: DISCONTINUED | OUTPATIENT
Start: 2020-03-25 | End: 2020-03-29 | Stop reason: HOSPADM

## 2020-03-24 RX ORDER — SUCCINYLCHOLINE/SOD CL,ISO/PF 100 MG/5ML
SYRINGE (ML) INTRAVENOUS PRN
Status: DISCONTINUED | OUTPATIENT
Start: 2020-03-24 | End: 2020-03-24 | Stop reason: SDUPTHER

## 2020-03-24 RX ADMIN — Medication 10 MG: at 12:16

## 2020-03-24 RX ADMIN — NITROGLYCERIN 100 MCG: 5 INJECTION, SOLUTION INTRAVENOUS at 11:49

## 2020-03-24 RX ADMIN — MORPHINE SULFATE 10 MG: 10 INJECTION INTRAMUSCULAR; INTRAVENOUS; SUBCUTANEOUS at 12:03

## 2020-03-24 RX ADMIN — MIDAZOLAM 10 MG: 5 INJECTION INTRAMUSCULAR; INTRAVENOUS at 08:51

## 2020-03-24 RX ADMIN — SODIUM CHLORIDE, POTASSIUM CHLORIDE, SODIUM LACTATE AND CALCIUM CHLORIDE: 600; 310; 30; 20 INJECTION, SOLUTION INTRAVENOUS at 08:50

## 2020-03-24 RX ADMIN — NITROGLYCERIN 100 MCG: 5 INJECTION, SOLUTION INTRAVENOUS at 12:00

## 2020-03-24 RX ADMIN — VANCOMYCIN HYDROCHLORIDE 1000 MG: 1 INJECTION, POWDER, LYOPHILIZED, FOR SOLUTION INTRAVENOUS at 08:41

## 2020-03-24 RX ADMIN — DOPAMINE HYDROCHLORIDE IN DEXTROSE 5 MCG/KG/MIN: 1.6 INJECTION, SOLUTION INTRAVENOUS at 11:36

## 2020-03-24 RX ADMIN — Medication 10 MG: at 09:10

## 2020-03-24 RX ADMIN — Medication 10 ML: at 19:39

## 2020-03-24 RX ADMIN — Medication 10 MG: at 09:36

## 2020-03-24 RX ADMIN — OXYCODONE 10 MG: 5 TABLET ORAL at 22:34

## 2020-03-24 RX ADMIN — PANTOPRAZOLE SODIUM 40 MG: 40 INJECTION, POWDER, FOR SOLUTION INTRAVENOUS at 15:28

## 2020-03-24 RX ADMIN — CEFAZOLIN 2 G: 1 INJECTION, POWDER, FOR SOLUTION INTRAMUSCULAR; INTRAVENOUS at 17:00

## 2020-03-24 RX ADMIN — NITROGLYCERIN 100 MCG: 5 INJECTION, SOLUTION INTRAVENOUS at 10:10

## 2020-03-24 RX ADMIN — PROPOFOL 50 MG: 10 INJECTION, EMULSION INTRAVENOUS at 08:51

## 2020-03-24 RX ADMIN — POTASSIUM CHLORIDE, DEXTROSE MONOHYDRATE AND SODIUM CHLORIDE 1000 ML: 150; 5; 450 INJECTION, SOLUTION INTRAVENOUS at 13:34

## 2020-03-24 RX ADMIN — EPINEPHRINE 10 MCG: 1 INJECTION, SOLUTION INTRAMUSCULAR; SUBCUTANEOUS at 12:43

## 2020-03-24 RX ADMIN — PHENYLEPHRINE HYDROCHLORIDE 100 MCG: 10 INJECTION INTRAVENOUS at 10:18

## 2020-03-24 RX ADMIN — PANTOPRAZOLE SODIUM 40 MG: 40 INJECTION, POWDER, FOR SOLUTION INTRAVENOUS at 05:18

## 2020-03-24 RX ADMIN — FENTANYL CITRATE 100 MCG: 50 INJECTION, SOLUTION INTRAMUSCULAR; INTRAVENOUS at 09:36

## 2020-03-24 RX ADMIN — MUPIROCIN: 20 OINTMENT TOPICAL at 19:54

## 2020-03-24 RX ADMIN — VASOPRESSIN 1 UNITS: 20 INJECTION INTRAVENOUS at 11:57

## 2020-03-24 RX ADMIN — CEFAZOLIN 2000 MG: 1 INJECTION, POWDER, FOR SOLUTION INTRAVENOUS at 09:00

## 2020-03-24 RX ADMIN — FENTANYL CITRATE 150 MCG: 50 INJECTION, SOLUTION INTRAMUSCULAR; INTRAVENOUS at 08:51

## 2020-03-24 RX ADMIN — DIPHENHYDRAMINE HYDROCHLORIDE 50 MG: 50 INJECTION, SOLUTION INTRAMUSCULAR; INTRAVENOUS at 10:23

## 2020-03-24 RX ADMIN — CARVEDILOL 3.12 MG: 3.12 TABLET, FILM COATED ORAL at 05:26

## 2020-03-24 RX ADMIN — MUPIROCIN: 20 OINTMENT TOPICAL at 06:57

## 2020-03-24 RX ADMIN — SENNOSIDES AND DOCUSATE SODIUM 1 TABLET: 8.6; 5 TABLET ORAL at 20:04

## 2020-03-24 RX ADMIN — VANCOMYCIN HYDROCHLORIDE 1000 MG: 1 INJECTION, POWDER, LYOPHILIZED, FOR SOLUTION INTRAVENOUS at 20:02

## 2020-03-24 RX ADMIN — Medication 100 MG: at 08:51

## 2020-03-24 RX ADMIN — Medication 10 ML: at 15:19

## 2020-03-24 RX ADMIN — FENTANYL CITRATE 50 MCG: 50 INJECTION, SOLUTION INTRAMUSCULAR; INTRAVENOUS at 08:08

## 2020-03-24 RX ADMIN — PHENYLEPHRINE HYDROCHLORIDE 200 MCG: 10 INJECTION INTRAVENOUS at 13:06

## 2020-03-24 RX ADMIN — SODIUM CHLORIDE 6 UNITS/HR: 9 INJECTION, SOLUTION INTRAVENOUS at 09:45

## 2020-03-24 RX ADMIN — METOPROLOL TARTRATE 0.5 MG: 1 INJECTION, SOLUTION INTRAVENOUS at 11:53

## 2020-03-24 RX ADMIN — MORPHINE SULFATE 10 MG: 10 INJECTION INTRAMUSCULAR; INTRAVENOUS; SUBCUTANEOUS at 12:00

## 2020-03-24 RX ADMIN — Medication 10 MG: at 08:58

## 2020-03-24 RX ADMIN — PROPOFOL 50 MG: 10 INJECTION, EMULSION INTRAVENOUS at 09:35

## 2020-03-24 RX ADMIN — MUPIROCIN: 20 OINTMENT TOPICAL at 14:00

## 2020-03-24 RX ADMIN — PHENYLEPHRINE HYDROCHLORIDE 100 MCG: 10 INJECTION INTRAVENOUS at 09:10

## 2020-03-24 RX ADMIN — Medication 10 MG: at 09:19

## 2020-03-24 RX ADMIN — FENTANYL CITRATE 250 MCG: 50 INJECTION, SOLUTION INTRAMUSCULAR; INTRAVENOUS at 10:22

## 2020-03-24 RX ADMIN — ALBUMIN (HUMAN) 250 ML: 12.5 INJECTION, SOLUTION INTRAVENOUS at 12:01

## 2020-03-24 RX ADMIN — FENTANYL CITRATE 50 MCG: 50 INJECTION, SOLUTION INTRAMUSCULAR; INTRAVENOUS at 07:55

## 2020-03-24 RX ADMIN — PHENYLEPHRINE HYDROCHLORIDE 100 MCG: 10 INJECTION INTRAVENOUS at 08:56

## 2020-03-24 RX ADMIN — Medication 10 MG: at 09:37

## 2020-03-24 RX ADMIN — PHENYLEPHRINE HYDROCHLORIDE 100 MCG: 10 INJECTION INTRAVENOUS at 09:00

## 2020-03-24 RX ADMIN — SODIUM CHLORIDE: 9 INJECTION, SOLUTION INTRAVENOUS at 08:50

## 2020-03-24 RX ADMIN — PHENYLEPHRINE HYDROCHLORIDE 100 MCG: 10 INJECTION INTRAVENOUS at 09:18

## 2020-03-24 RX ADMIN — ONDANSETRON 4 MG: 2 INJECTION INTRAMUSCULAR; INTRAVENOUS at 10:23

## 2020-03-24 RX ADMIN — ACETAMINOPHEN 1000 MG: 500 TABLET, FILM COATED ORAL at 20:15

## 2020-03-24 RX ADMIN — EPINEPHRINE 10 MCG: 1 INJECTION, SOLUTION INTRAMUSCULAR; SUBCUTANEOUS at 11:44

## 2020-03-24 RX ADMIN — METOPROLOL TARTRATE 0.5 MG: 1 INJECTION, SOLUTION INTRAVENOUS at 11:57

## 2020-03-24 RX ADMIN — NITROGLYCERIN 10 MCG/MIN: 20 INJECTION INTRAVENOUS at 11:37

## 2020-03-24 RX ADMIN — ALBUMIN (HUMAN) 25 G: 12.5 INJECTION, SOLUTION INTRAVENOUS at 14:00

## 2020-03-24 RX ADMIN — NITROGLYCERIN 100 MCG: 5 INJECTION, SOLUTION INTRAVENOUS at 10:11

## 2020-03-24 RX ADMIN — MIDAZOLAM 10 MG: 5 INJECTION INTRAMUSCULAR; INTRAVENOUS at 10:22

## 2020-03-24 RX ADMIN — ONDANSETRON 4 MG: 2 INJECTION INTRAMUSCULAR; INTRAVENOUS at 19:28

## 2020-03-24 RX ADMIN — VECURONIUM BROMIDE 10 MG: 1 INJECTION, POWDER, LYOPHILIZED, FOR SOLUTION INTRAVENOUS at 10:21

## 2020-03-24 RX ADMIN — PROPOFOL 60 MG: 10 INJECTION, EMULSION INTRAVENOUS at 12:00

## 2020-03-24 RX ADMIN — Medication 30 MG: at 10:22

## 2020-03-24 RX ADMIN — SODIUM CHLORIDE, POTASSIUM CHLORIDE, SODIUM LACTATE AND CALCIUM CHLORIDE: 600; 310; 30; 20 INJECTION, SOLUTION INTRAVENOUS at 07:55

## 2020-03-24 RX ADMIN — Medication 20 MG: at 08:51

## 2020-03-24 RX ADMIN — VECURONIUM BROMIDE 10 MG: 1 INJECTION, POWDER, LYOPHILIZED, FOR SOLUTION INTRAVENOUS at 09:00

## 2020-03-24 ASSESSMENT — PULMONARY FUNCTION TESTS
PIF_VALUE: 24
PIF_VALUE: 0
PIF_VALUE: 20
PIF_VALUE: 5
PIF_VALUE: 20
PIF_VALUE: 18
PIF_VALUE: 17
PIF_VALUE: 15
PIF_VALUE: 23
PIF_VALUE: 14
PIF_VALUE: 15
PIF_VALUE: 0
PIF_VALUE: 24
PIF_VALUE: 2
PIF_VALUE: 23
PIF_VALUE: 16
PIF_VALUE: 15
PIF_VALUE: 0
PIF_VALUE: 13
PIF_VALUE: 24
PIF_VALUE: 20
PIF_VALUE: 19
PIF_VALUE: 19
PIF_VALUE: 16
PIF_VALUE: 1
PIF_VALUE: 14
PIF_VALUE: 19
PIF_VALUE: 19
PIF_VALUE: 23
PIF_VALUE: 15
PIF_VALUE: 22
PIF_VALUE: 14
PIF_VALUE: 15
PIF_VALUE: 0
PIF_VALUE: 15
PIF_VALUE: 15
PIF_VALUE: 1
PIF_VALUE: 0
PIF_VALUE: 1
PIF_VALUE: 15
PIF_VALUE: 0
PIF_VALUE: 1
PIF_VALUE: 1
PIF_VALUE: 2
PIF_VALUE: 0
PIF_VALUE: 14
PIF_VALUE: 19
PIF_VALUE: 1
PIF_VALUE: 15
PIF_VALUE: 2
PIF_VALUE: 2
PIF_VALUE: 16
PIF_VALUE: 1
PIF_VALUE: 15
PIF_VALUE: 24
PIF_VALUE: 0
PIF_VALUE: 22
PIF_VALUE: 19
PIF_VALUE: 14
PIF_VALUE: 15
PIF_VALUE: 1
PIF_VALUE: 0
PIF_VALUE: 14
PIF_VALUE: 1
PIF_VALUE: 15
PIF_VALUE: 17
PIF_VALUE: 0
PIF_VALUE: 15
PIF_VALUE: 29
PIF_VALUE: 14
PIF_VALUE: 0
PIF_VALUE: 15
PIF_VALUE: 19
PIF_VALUE: 1
PIF_VALUE: 23
PIF_VALUE: 15
PIF_VALUE: 22
PIF_VALUE: 15
PIF_VALUE: 16
PIF_VALUE: 2
PIF_VALUE: 17
PIF_VALUE: 19
PIF_VALUE: 1
PIF_VALUE: 15
PIF_VALUE: 1
PIF_VALUE: 0
PIF_VALUE: 16
PIF_VALUE: 24
PIF_VALUE: 1
PIF_VALUE: 20
PIF_VALUE: 15
PIF_VALUE: 24
PIF_VALUE: 24
PIF_VALUE: 16
PIF_VALUE: 18
PIF_VALUE: 1
PIF_VALUE: 18
PIF_VALUE: 2
PIF_VALUE: 14
PIF_VALUE: 15
PIF_VALUE: 1
PIF_VALUE: 0
PIF_VALUE: 14
PIF_VALUE: 1
PIF_VALUE: 1
PIF_VALUE: 15
PIF_VALUE: 1
PIF_VALUE: 15
PIF_VALUE: 14
PIF_VALUE: 14
PIF_VALUE: 15
PIF_VALUE: 18
PIF_VALUE: 23
PIF_VALUE: 0
PIF_VALUE: 0
PIF_VALUE: 24
PIF_VALUE: 16
PIF_VALUE: 15
PIF_VALUE: 1
PIF_VALUE: 15
PIF_VALUE: 15
PIF_VALUE: 20
PIF_VALUE: 0
PIF_VALUE: 1
PIF_VALUE: 1
PIF_VALUE: 24
PIF_VALUE: 0
PIF_VALUE: 24
PIF_VALUE: 14
PIF_VALUE: 15
PIF_VALUE: 0
PIF_VALUE: 2
PIF_VALUE: 16
PIF_VALUE: 0
PIF_VALUE: 0
PIF_VALUE: 21
PIF_VALUE: 0
PIF_VALUE: 7
PIF_VALUE: 24
PIF_VALUE: 0
PIF_VALUE: 15
PIF_VALUE: 0
PIF_VALUE: 14
PIF_VALUE: 14
PIF_VALUE: 15
PIF_VALUE: 1
PIF_VALUE: 20
PIF_VALUE: 19
PIF_VALUE: 0
PIF_VALUE: 14
PIF_VALUE: 20
PIF_VALUE: 14
PIF_VALUE: 22
PIF_VALUE: 15
PIF_VALUE: 1
PIF_VALUE: 1
PIF_VALUE: 15
PIF_VALUE: 15
PIF_VALUE: 19
PIF_VALUE: 15
PIF_VALUE: 2
PIF_VALUE: 2
PIF_VALUE: 16
PIF_VALUE: 0
PIF_VALUE: 0
PIF_VALUE: 16
PIF_VALUE: 2
PIF_VALUE: 19
PIF_VALUE: 15
PIF_VALUE: 0
PIF_VALUE: 1
PIF_VALUE: 15
PIF_VALUE: 16
PIF_VALUE: 1
PIF_VALUE: 15
PIF_VALUE: 1
PIF_VALUE: 0
PIF_VALUE: 14
PIF_VALUE: 14
PIF_VALUE: 15
PIF_VALUE: 1
PIF_VALUE: 15
PIF_VALUE: 15
PIF_VALUE: 1
PIF_VALUE: 15
PIF_VALUE: 24
PIF_VALUE: 19
PIF_VALUE: 15
PIF_VALUE: 1
PIF_VALUE: 15
PIF_VALUE: 15
PIF_VALUE: 18
PIF_VALUE: 21
PIF_VALUE: 20
PIF_VALUE: 33
PIF_VALUE: 1
PIF_VALUE: 18
PIF_VALUE: 25
PIF_VALUE: 14
PIF_VALUE: 2
PIF_VALUE: 1
PIF_VALUE: 15
PIF_VALUE: 14
PIF_VALUE: 15
PIF_VALUE: 14
PIF_VALUE: 0
PIF_VALUE: 20
PIF_VALUE: 14
PIF_VALUE: 22
PIF_VALUE: 16
PIF_VALUE: 15
PIF_VALUE: 0
PIF_VALUE: 2
PIF_VALUE: 17
PIF_VALUE: 19
PIF_VALUE: 0
PIF_VALUE: 30
PIF_VALUE: 18
PIF_VALUE: 15
PIF_VALUE: 19
PIF_VALUE: 16
PIF_VALUE: 19
PIF_VALUE: 15
PIF_VALUE: 1
PIF_VALUE: 15
PIF_VALUE: 27
PIF_VALUE: 17
PIF_VALUE: 20
PIF_VALUE: 0

## 2020-03-24 ASSESSMENT — PAIN SCALES - GENERAL
PAINLEVEL_OUTOF10: 0
PAINLEVEL_OUTOF10: 1
PAINLEVEL_OUTOF10: 0
PAINLEVEL_OUTOF10: 8
PAINLEVEL_OUTOF10: 0

## 2020-03-24 NOTE — PROGRESS NOTES
Placed pt on SIMV/VC 12/650/ PS15/ 100%/+6 per Dr. Feng Fearing orders at bedside. ET tube is 23 cm @ the lip. Pt tolerating well.

## 2020-03-24 NOTE — ANESTHESIA POSTPROCEDURE EVALUATION
Department of Anesthesiology  Postprocedure Note    Patient: Jt Argueta  MRN: [id number]  YOB: 1955  Date of evaluation: 3/24/2020  Time:  1:19 PM     Procedure Summary     Date:  03/24/20 Room / Location:  [id number] [place] [address]    Anesthesia Start:  9357 Anesthesia Stop:  4015    Procedure:  TCPB,  Urgent CABG X3, EVH left saphenous vein, LIMA grafting X1, SVG x2,  Ligation LEFT ATRIAL APPENDAGE - 45 atriclip, Doppler vessel flow verification, bilateral intercostal nerve block. (N/A ) Diagnosis:  (CORONARY ARTERY DISEASE)    Surgeon:  Reginaldo Rendon MD Responsible Provider:  Aleksey Kennedy MD    Anesthesia Type:  general ASA Status:  4          Anesthesia Type: general    Randa Phase I:      Randa Phase II:      Last vitals: Reviewed and per EMR flowsheets.        Anesthesia Post Evaluation    Patient location during evaluation: ICU  Patient participation: complete - patient cannot participate  Level of consciousness: sedated and ventilated  Airway patency: patent  Complications: no  Cardiovascular status: hemodynamically stable  Respiratory status: ventilator and acceptable  Hydration status: euvolemic

## 2020-03-24 NOTE — PROGRESS NOTES
Patient admitted to CVU from [place] [address] and attached to ventilator and monitors. Report received from anesthesiologist.  Chest x-ray ordered. Labs drawn and sent. Assessment complete. Hemodymanics stable and will continue to monitor. Family let back to see patient. Visiting hours reviewed and all questions answered. Family aware of discharge class.  Primary RN ninoska    RECOVERY PERIOD BEGINS  [id number]

## 2020-03-24 NOTE — ANESTHESIA PRE PROCEDURE
Department of Anesthesiology  Preprocedure Note       Name:  Ofelia Martin   Age:  60 y.o.  :  1955                                          MRN:  [id number]         Date:  3/24/2020      Surgeon: Miroslava Neumann): Danitza Lepe MD    Procedure: CABG CORONARY ARTERY BYPASS WITH POSSIBLE LEFT ATRIAL APPENDAGE (N/A )    Medications prior to admission:   Prior to Admission medications    Medication Sig Start Date End Date Taking?  Authorizing Provider   glipiZIDE (GLUCOTROL) 10 MG tablet Take 10 mg by mouth 2 times daily (before meals)   Yes Historical Provider, MD   loperamide (IMODIUM) 2 MG capsule Take 2 mg by mouth daily   Yes Historical Provider, MD   mesalamine (APRISO) 0.375 g extended release capsule Take 1.5 g by mouth daily   Yes Historical Provider, MD   losartan (COZAAR) 25 MG tablet Take 25 mg by mouth daily   Yes Historical Provider, MD   metoprolol tartrate (LOPRESSOR) 25 MG tablet Take 25 mg by mouth 2 times daily   Yes Historical Provider, MD   tiZANidine (ZANAFLEX) 4 MG tablet Take 4 mg by mouth every 8 hours as needed   Yes Historical Provider, MD   aspirin 81 MG tablet Take 81 mg by mouth daily   Yes Historical Provider, MD   meclizine (ANTIVERT) 25 MG tablet Take 25 mg by mouth 3 times daily as needed    Historical Provider, MD       Current medications:    Current Facility-Administered Medications   Medication Dose Route Frequency Provider Last Rate Last Dose    aminocaproic acid (AMICAR) 5,000 mg in sodium chloride 0.9 % 250 mL infusion bolus  5 g Intravenous Once Tanner Benitez MD        insulin regular (HUMULIN R;NOVOLIN R) 100 Units in sodium chloride 0.9 % 100 mL infusion  0.1 Units/kg/hr Intravenous Once Tanner Benitez MD        norepinephrine (LEVOPHED) 16 mg in dextrose 5 % 250 mL infusion  2 mcg/min Intravenous Once Tanner Benitez MD        phenylephrine (SHANA-SYNEPHRINE) 50 mg in dextrose 5 % 250 mL infusion  100 mcg/min Intravenous Once Tanner Benitez MD Intravenous 2 times per day Tori Smith MD   10 mL at 03/23/20 2011    sodium chloride flush 0.9 % injection 10 mL  10 mL Intravenous PRN Tori Smith MD        acetaminophen (TYLENOL) tablet 650 mg  650 mg Oral Q6H PRN Tori Smith MD        Or   [facility] acetaminophen (TYLENOL) suppository 650 mg  650 mg Rectal Q6H PRN Tori Smith MD        polyethylene glycol (GLYCOLAX) packet 17 g  17 g Oral Daily PRN Tori Smith MD        promethazine (PHENERGAN) tablet 12.5 mg  12.5 mg Oral Q6H PRN Tori Smith MD        Or    ondansetron (ZOFRAN) injection 4 mg  4 mg Intravenous Q6H PRN Tori Smith MD        dextrose 50 % IV solution  12.5 g Intravenous PRN Tori Smith MD        dextrose 5 % solution  100 mL/hr Intravenous PRN Tori Smith MD           Allergies:     Allergies   Allergen Reactions    Lipitor [Atorvastatin]        Problem List:    Patient Active Problem List   Diagnosis Code    [facility] I50.9    3-vessel coronary artery disease I25.10    NSTEMI (non-ST elevated myocardial infarction) ([facility] [address] I21.4    HTN (hypertension), benign I10    Uncontrolled type 2 diabetes mellitus with hyperglycemia (HCC) R58.13    Acute systolic (congestive) heart failure (HCC) I50.21    Acute respiratory failure with hypoxia ([facility]) J96.01    Aspiration pneumonia (HCC) J69.0    Acute encephalopathy G93.40    DM (diabetes mellitus), type 2, uncontrolled with complications ([facility] [address] X56.3, Z62.47    Metabolic alkalosis S18.8    Hypernatremia E87.0    Acute delirium R41.0    Ischemic cardiomyopathy I25.5    Hypokalemia E87.6       Past Medical History:        Diagnosis Date    CAD (coronary artery disease)     CHF (congestive heart failure) (HCC)     Diabetes mellitus ([facility] [address]     Hyperlipidemia     Hypertension        Past Surgical History:        Procedure Laterality Date    CARDIAC SURGERY      angiogram 3/2020    COLONOSCOPY         Social History:    Social History     Tobacco Use    Smoking status: Former Smoker     Last attempt to quit: 3/16/1995     Years since quittin.0    Smokeless tobacco: Never Used   Substance Use Topics    Alcohol use: Yes     Alcohol/week: 4.0 - 12.0 standard drinks     Types: 4 - 12 Cans of beer per week                                Counseling given: Not Answered      Vital Signs (Current):   Vitals:    20 2030 20 2332 20 0500 20 0525   BP:  137/79  134/66   Pulse: 101 82 66 73   Resp:  16  14   Temp:  97.1 °F (36.2 °C)  97.2 °F (36.2 °C)   TempSrc:  Temporal  Temporal   SpO2:  95%  95%   Weight:    163 lb 12.8 oz (74.3 kg)   Height:                                                  BP Readings from Last 3 Encounters:   20 134/66       NPO Status:                                                                                 BMI:   Wt Readings from Last 3 Encounters:   20 163 lb 12.8 oz (74.3 kg)     Body mass index is 25.28 kg/m².     CBC:   Lab Results   Component Value Date    WBC 9.9 2020    RBC 4.64 2020    HGB 14.0 2020    HCT 41.1 2020    MCV 88.6 2020    RDW 12.8 2020     2020       CMP:   Lab Results   Component Value Date     2020    K 3.9 2020     2020    CO2 26 2020    BUN 18 2020    CREATININE 0.6 2020    GFRAA >60 2020    LABGLOM >60 2020    GLUCOSE 142 2020    PROT 7.0 2020    CALCIUM 8.9 2020    BILITOT <0.2 2020    ALKPHOS 76 2020    AST 47 2020    ALT 53 2020       POC Tests:   Recent Labs     20  2007   POCGLU 322*       Coags:   Lab Results   Component Value Date    PROTIME 13.5 2020    INR 1.16 2020    APTT 35.6 2020       HCG (If Applicable): No results found for: PREGTESTUR, PREGSERUM, HCG, HCGQUANT     ABGs:   Lab Results   Component Value Date    PHART 7.386 2020    PO2ART 108.0 2020

## 2020-03-24 NOTE — ANESTHESIA PROCEDURE NOTES
Central Venous Line:    A central venous line was placed using ultrasound guidance and surface landmarks, in the pre-op for the following indication(s): central venous access and CVP monitoring. 3/24/2020 8:10 AM3/24/2020 8:31 AM    Sterility preparation included the following: hand hygiene performed prior to procedure, maximum sterile barriers used and sterile technique used to drape from head to toe. The patient was placed in Trendelenburg position. The right internal jugular vein was prepped. The site was prepped with Chloraprep. A 9 Fr (size), introducer triple lumen was placed. During the procedure, the following specific steps were taken: target vein identified, needle advanced into vein and blood aspirated and guidewire advanced into vein. Intravenous verification was obtained by ultrasound and venous blood return. Post insertion care included: all ports aspirated, all ports flushed easily, guidewire removed intact, Biopatch applied, line sutured in place and dressing applied. During the procedure the patient experienced: patient tolerated procedure well with no complications and EBL < 5mL. Insertion site scrubbed per usage guidelines?: Yes  Skin prep agent dried for 3 minutes prior to procedure?:yes  Anesthesia type: localA(n) non-oximetric, 7.5 (size) Pulmonary Artery Catheter (PAC) was placed through the Introducer CVL in the right internal jugular vein. The PAC placement was confirmed by pressure tracing changes. The patient experienced the following events during the procedure: patient tolerated procedure well with no complications. PA Cath placed?: Yes    Additional notes:  Good waveform.   Staffing  Anesthesiologist: Keesha Rudolph MD  Performed: Anesthesiologist   Preanesthetic Checklist  Completed: patient identified, site marked, surgical consent, pre-op evaluation, timeout performed, IV checked, risks and benefits discussed, monitors and equipment checked, anesthesia consent given, oxygen available and patient being monitored

## 2020-03-24 NOTE — PROGRESS NOTES
Pt was already extubated and on 8 LPM when I entered the room spo2 96%, Pt self- extubated. I switched pt from a regular nasal cannula to 8 L HFNC with Spo2 of 96%. BiPAP on standby.

## 2020-03-24 NOTE — PLAN OF CARE
Problem: HEMODYNAMIC STATUS  Goal: Patient has stable vital signs and fluid balance  Outcome: Met This Shift     Problem: Falls - Risk of:  Goal: Will remain free from falls  Description: Will remain free from falls  Outcome: Ongoing  Fall precautions in place; fall sign, bed alarm, non-skid socks, call light and bedside table within reach, hourly rounding.   Goal: Absence of physical injury  Description: Absence of physical injury  Outcome: Ongoing     Problem: Bleeding:  Goal: Will show no signs and symptoms of excessive bleeding  Description: Will show no signs and symptoms of excessive bleeding  Outcome: Ongoing     Problem: Neurological  Goal: Maximum potential motor/sensory/cognitive function  Outcome: Ongoing     Problem: Pain Control  Goal: Maintain pain level at or below patient's acceptable level (or 5 if patient is unable to determine acceptable level)  Outcome: Ongoing     Problem: Cardiovascular  Goal: Hemodynamic stability  3/23/2020 1810 by Praful Samuels RN  Outcome: Ongoing     Problem: Respiratory  Goal: No pulmonary complications  Outcome: Ongoing  Goal: O2 Sat > 90%  Outcome: Ongoing     Problem: Discharge Planning:  Goal: Participates in care planning  Description: Participates in care planning  Outcome: Ongoing     Problem: Anxiety/Stress:  Goal: Level of anxiety will decrease  Description: Level of anxiety will decrease  Outcome: Ongoing     Problem: Aspiration:  Goal: Absence of aspiration  Description: Absence of aspiration  Outcome: Ongoing  SLP, modified diet, sitting upright while eating/drinking    Problem: Pain:  Goal: Recognizes and communicates pain  Description: Recognizes and communicates pain  Outcome: Ongoing     Problem: Serum Glucose Level - Abnormal:  Goal: Ability to maintain appropriate glucose levels will improve to within specified parameters  Description: Ability to maintain appropriate glucose levels will improve to within specified parameters  Outcome: Ongoing Problem: Skin Integrity - Impaired:  Goal: Will show no infection signs and symptoms  Description: Will show no infection signs and symptoms  Outcome: Ongoing     Problem: Nutrition  Goal: Optimal nutrition therapy  Outcome: Met This Shift     Problem: Fluid Volume:  Goal: Will show no signs or symptoms of fluid imbalance  Description: Will show no signs or symptoms of fluid imbalance  Outcome: Met This Shift     Problem: Skin Integrity - Impaired:  Goal: Absence of new skin breakdown  Description: Absence of new skin breakdown  Outcome: Met This Shift

## 2020-03-24 NOTE — BRIEF OP NOTE
or short term intermittent urethral catherization 3/20/2020  4:00 PM   Site Assessment [place] 3/20/2020  4:00 PM   Urine Color Yellow;Ed 3/20/2020  4:00 PM   Urine Appearance [place] 3/20/2020  4:00 PM   Output (mL) 35 mL 3/20/2020  7:48 AM       Findings: Excellent LIMA and good SV. 2mm LAD and OM1, 1.5mm PDA. Excellent doppler signals in all grafts post-CPB.     Anjali Ornelas MD  Date: 3/24/2020  Time: 12:24 PM

## 2020-03-25 LAB
ACTIVATED CLOTTING TIME: >1005 SEC (ref 99–130)
ANION GAP SERPL CALCULATED.3IONS-SCNC: 10 MMOL/L (ref 3–16)
BUN BLDV-MCNC: 15 MG/DL (ref 7–20)
CALCIUM SERPL-MCNC: 8.2 MG/DL (ref 8.3–10.6)
CHLORIDE BLD-SCNC: 111 MMOL/L (ref 99–110)
CO2: 23 MMOL/L (ref 21–32)
CREAT SERPL-MCNC: 0.7 MG/DL (ref 0.8–1.3)
GFR AFRICAN AMERICAN: >60
GFR NON-AFRICAN AMERICAN: >60
GLUCOSE BLD-MCNC: 100 MG/DL (ref 70–99)
GLUCOSE BLD-MCNC: 100 MG/DL (ref 70–99)
GLUCOSE BLD-MCNC: 101 MG/DL (ref 70–99)
GLUCOSE BLD-MCNC: 112 MG/DL (ref 70–99)
GLUCOSE BLD-MCNC: 112 MG/DL (ref 70–99)
GLUCOSE BLD-MCNC: 127 MG/DL (ref 70–99)
GLUCOSE BLD-MCNC: 74 MG/DL (ref 70–99)
GLUCOSE BLD-MCNC: 78 MG/DL (ref 70–99)
GLUCOSE BLD-MCNC: 82 MG/DL (ref 70–99)
GLUCOSE BLD-MCNC: 85 MG/DL (ref 70–99)
GLUCOSE BLD-MCNC: 86 MG/DL (ref 70–99)
GLUCOSE BLD-MCNC: 87 MG/DL (ref 70–99)
GLUCOSE BLD-MCNC: 87 MG/DL (ref 70–99)
GLUCOSE BLD-MCNC: 88 MG/DL (ref 70–99)
GLUCOSE BLD-MCNC: 88 MG/DL (ref 70–99)
GLUCOSE BLD-MCNC: 91 MG/DL (ref 70–99)
GLUCOSE BLD-MCNC: 93 MG/DL (ref 70–99)
GLUCOSE BLD-MCNC: 93 MG/DL (ref 70–99)
GLUCOSE BLD-MCNC: 96 MG/DL (ref 70–99)
GLUCOSE BLD-MCNC: 97 MG/DL (ref 70–99)
GLUCOSE BLD-MCNC: 98 MG/DL (ref 70–99)
HCT VFR BLD CALC: 34.7 % (ref 40.5–52.5)
HEMOGLOBIN: 11.6 G/DL (ref 13.5–17.5)
HEMOGLOBIN: 11.6 GM/DL (ref 13.5–17.5)
MAGNESIUM: 2.4 MG/DL (ref 1.8–2.4)
MCH RBC QN AUTO: 30.1 PG (ref 26–34)
MCHC RBC AUTO-ENTMCNC: 33.4 G/DL (ref 31–36)
MCV RBC AUTO: 90 FL (ref 80–100)
PDW BLD-RTO: 12.8 % (ref 12.4–15.4)
PERFORMED ON: ABNORMAL
PERFORMED ON: NORMAL
PLATELET # BLD: 228 K/UL (ref 135–450)
PMV BLD AUTO: 9.3 FL (ref 5–10.5)
POC HEMATOCRIT: 34 % (ref 40.5–52.5)
POC POTASSIUM: 4.6 MMOL/L (ref 3.5–5.1)
POC SAMPLE TYPE: ABNORMAL
POTASSIUM SERPL-SCNC: 4.7 MMOL/L (ref 3.5–5.1)
RBC # BLD: 3.85 M/UL (ref 4.2–5.9)
SODIUM BLD-SCNC: 144 MMOL/L (ref 136–145)
WBC # BLD: 21.3 K/UL (ref 4–11)

## 2020-03-25 PROCEDURE — 2000000000 HC ICU R&B

## 2020-03-25 PROCEDURE — 2700000000 HC OXYGEN THERAPY PER DAY

## 2020-03-25 PROCEDURE — 2580000003 HC RX 258: Performed by: THORACIC SURGERY (CARDIOTHORACIC VASCULAR SURGERY)

## 2020-03-25 PROCEDURE — 85027 COMPLETE CBC AUTOMATED: CPT

## 2020-03-25 PROCEDURE — 94761 N-INVAS EAR/PLS OXIMETRY MLT: CPT

## 2020-03-25 PROCEDURE — 83735 ASSAY OF MAGNESIUM: CPT

## 2020-03-25 PROCEDURE — 37799 UNLISTED PX VASCULAR SURGERY: CPT

## 2020-03-25 PROCEDURE — P9045 ALBUMIN (HUMAN), 5%, 250 ML: HCPCS | Performed by: THORACIC SURGERY (CARDIOTHORACIC VASCULAR SURGERY)

## 2020-03-25 PROCEDURE — P9047 ALBUMIN (HUMAN), 25%, 50ML: HCPCS | Performed by: NURSE PRACTITIONER

## 2020-03-25 PROCEDURE — 80048 BASIC METABOLIC PNL TOTAL CA: CPT

## 2020-03-25 PROCEDURE — 99024 POSTOP FOLLOW-UP VISIT: CPT | Performed by: THORACIC SURGERY (CARDIOTHORACIC VASCULAR SURGERY)

## 2020-03-25 PROCEDURE — 97164 PT RE-EVAL EST PLAN CARE: CPT

## 2020-03-25 PROCEDURE — 82947 ASSAY GLUCOSE BLOOD QUANT: CPT

## 2020-03-25 PROCEDURE — 97168 OT RE-EVAL EST PLAN CARE: CPT

## 2020-03-25 PROCEDURE — 94150 VITAL CAPACITY TEST: CPT

## 2020-03-25 PROCEDURE — 94669 MECHANICAL CHEST WALL OSCILL: CPT

## 2020-03-25 PROCEDURE — 92526 ORAL FUNCTION THERAPY: CPT

## 2020-03-25 PROCEDURE — 6370000000 HC RX 637 (ALT 250 FOR IP): Performed by: THORACIC SURGERY (CARDIOTHORACIC VASCULAR SURGERY)

## 2020-03-25 PROCEDURE — 6360000002 HC RX W HCPCS: Performed by: THORACIC SURGERY (CARDIOTHORACIC VASCULAR SURGERY)

## 2020-03-25 PROCEDURE — 97129 THER IVNTJ 1ST 15 MIN: CPT

## 2020-03-25 PROCEDURE — C9113 INJ PANTOPRAZOLE SODIUM, VIA: HCPCS | Performed by: THORACIC SURGERY (CARDIOTHORACIC VASCULAR SURGERY)

## 2020-03-25 PROCEDURE — 6370000000 HC RX 637 (ALT 250 FOR IP): Performed by: NURSE PRACTITIONER

## 2020-03-25 PROCEDURE — 99233 SBSQ HOSP IP/OBS HIGH 50: CPT | Performed by: INTERNAL MEDICINE

## 2020-03-25 PROCEDURE — 6360000002 HC RX W HCPCS: Performed by: NURSE PRACTITIONER

## 2020-03-25 PROCEDURE — 84132 ASSAY OF SERUM POTASSIUM: CPT

## 2020-03-25 PROCEDURE — 85014 HEMATOCRIT: CPT

## 2020-03-25 RX ORDER — ALBUMIN (HUMAN) 12.5 G/50ML
25 SOLUTION INTRAVENOUS EVERY 8 HOURS
Status: COMPLETED | OUTPATIENT
Start: 2020-03-25 | End: 2020-03-27

## 2020-03-25 RX ORDER — ALBUMIN (HUMAN) 12.5 G/50ML
25 SOLUTION INTRAVENOUS ONCE
Status: DISCONTINUED | OUTPATIENT
Start: 2020-03-25 | End: 2020-03-25

## 2020-03-25 RX ORDER — SODIUM CHLORIDE 9 MG/ML
INJECTION, SOLUTION INTRAVENOUS
Status: DISCONTINUED
Start: 2020-03-25 | End: 2020-03-25

## 2020-03-25 RX ADMIN — Medication 400 MG: at 09:21

## 2020-03-25 RX ADMIN — POTASSIUM CHLORIDE 10 MEQ: 750 TABLET, FILM COATED, EXTENDED RELEASE ORAL at 16:56

## 2020-03-25 RX ADMIN — FUROSEMIDE 20 MG: 10 INJECTION, SOLUTION INTRAMUSCULAR; INTRAVENOUS at 20:28

## 2020-03-25 RX ADMIN — POTASSIUM CHLORIDE 10 MEQ: 750 TABLET, FILM COATED, EXTENDED RELEASE ORAL at 09:21

## 2020-03-25 RX ADMIN — INSULIN GLARGINE 25 UNITS: 100 INJECTION, SOLUTION SUBCUTANEOUS at 20:30

## 2020-03-25 RX ADMIN — CEFAZOLIN 2 G: 1 INJECTION, POWDER, FOR SOLUTION INTRAMUSCULAR; INTRAVENOUS at 09:30

## 2020-03-25 RX ADMIN — ACETAMINOPHEN 1000 MG: 500 TABLET, FILM COATED ORAL at 09:14

## 2020-03-25 RX ADMIN — OXYCODONE 5 MG: 5 TABLET ORAL at 20:36

## 2020-03-25 RX ADMIN — ALBUMIN (HUMAN) 25 G: 0.25 INJECTION, SOLUTION INTRAVENOUS at 13:26

## 2020-03-25 RX ADMIN — Medication 400 MG: at 20:28

## 2020-03-25 RX ADMIN — CEFAZOLIN 2 G: 1 INJECTION, POWDER, FOR SOLUTION INTRAMUSCULAR; INTRAVENOUS at 01:10

## 2020-03-25 RX ADMIN — SODIUM CHLORIDE 3.6 UNITS/HR: 9 INJECTION, SOLUTION INTRAVENOUS at 12:05

## 2020-03-25 RX ADMIN — MUPIROCIN: 20 OINTMENT TOPICAL at 12:07

## 2020-03-25 RX ADMIN — SENNOSIDES AND DOCUSATE SODIUM 1 TABLET: 8.6; 5 TABLET ORAL at 09:50

## 2020-03-25 RX ADMIN — FUROSEMIDE 20 MG: 10 INJECTION, SOLUTION INTRAMUSCULAR; INTRAVENOUS at 13:29

## 2020-03-25 RX ADMIN — OXYCODONE 5 MG: 5 TABLET ORAL at 09:15

## 2020-03-25 RX ADMIN — POTASSIUM CHLORIDE 10 MEQ: 750 TABLET, FILM COATED, EXTENDED RELEASE ORAL at 12:10

## 2020-03-25 RX ADMIN — PANTOPRAZOLE SODIUM 40 MG: 40 INJECTION, POWDER, FOR SOLUTION INTRAVENOUS at 09:49

## 2020-03-25 RX ADMIN — ALBUMIN (HUMAN) 25 G: 0.25 INJECTION, SOLUTION INTRAVENOUS at 20:42

## 2020-03-25 RX ADMIN — ALBUMIN (HUMAN) 25 G: 12.5 INJECTION, SOLUTION INTRAVENOUS at 09:18

## 2020-03-25 RX ADMIN — SENNOSIDES AND DOCUSATE SODIUM 1 TABLET: 8.6; 5 TABLET ORAL at 20:28

## 2020-03-25 RX ADMIN — MUPIROCIN: 20 OINTMENT TOPICAL at 20:30

## 2020-03-25 RX ADMIN — ACETAMINOPHEN 1000 MG: 500 TABLET, FILM COATED ORAL at 13:00

## 2020-03-25 RX ADMIN — Medication 10 ML: at 20:34

## 2020-03-25 RX ADMIN — CEFAZOLIN 2 G: 1 INJECTION, POWDER, FOR SOLUTION INTRAMUSCULAR; INTRAVENOUS at 16:56

## 2020-03-25 RX ADMIN — ASPIRIN 325 MG: 325 TABLET, COATED ORAL at 14:18

## 2020-03-25 RX ADMIN — ACETAMINOPHEN 1000 MG: 500 TABLET, FILM COATED ORAL at 20:28

## 2020-03-25 RX ADMIN — FONDAPARINUX SODIUM 2.5 MG: 2.5 INJECTION, SOLUTION SUBCUTANEOUS at 14:12

## 2020-03-25 ASSESSMENT — PAIN DESCRIPTION - LOCATION
LOCATION: CHEST;INCISION
LOCATION: CHEST;INCISION

## 2020-03-25 ASSESSMENT — PAIN SCALES - GENERAL
PAINLEVEL_OUTOF10: 5
PAINLEVEL_OUTOF10: 0
PAINLEVEL_OUTOF10: 5
PAINLEVEL_OUTOF10: 0
PAINLEVEL_OUTOF10: 0
PAINLEVEL_OUTOF10: 5
PAINLEVEL_OUTOF10: 0
PAINLEVEL_OUTOF10: 0

## 2020-03-25 ASSESSMENT — PAIN DESCRIPTION - DESCRIPTORS
DESCRIPTORS: ACHING
DESCRIPTORS: ACHING;DISCOMFORT

## 2020-03-25 ASSESSMENT — PAIN DESCRIPTION - ONSET: ONSET: ON-GOING

## 2020-03-25 ASSESSMENT — PAIN DESCRIPTION - ORIENTATION: ORIENTATION: MID

## 2020-03-25 ASSESSMENT — PULMONARY FUNCTION TESTS: PEFR_L/MIN: 8

## 2020-03-25 NOTE — PROGRESS NOTES
Criteria met per clinical pathway to remove epicardial pacing wires & MD order received. Procedure explained to patient. Pacing wire site within normal limits. Cleansed site with Chloroprep. Ventricular pacing wires removed per policy without difficulty. Dry sterile dressing applied. Vital signs will be monitored and recorded per open heart protocol (every 15 minutes X 2, every 30 minutes X 1, and every hour X 1). Patient tolerated procedure well, heart tones easily auscultated, oxygen saturation within normal limits. Signs/symtoms for cardiac tamponade will be monitored closely.          Chelsea Gibson  3/25/2020

## 2020-03-25 NOTE — PROGRESS NOTES
D:  Report received from previous shift RN, Sinai Irwin. See nursing flow sheet for assessment and document flow sheet for vital signs/hemodynamic status. A:  Instructed patient on plan of care and goals for the day. R: Patient verbalized understanding. Dr Jenn Bailey in to visit. Updates provided. Dopamine remains at 1 mcg/kg/min due to blood pressure decreasing when weaned. 500 ml 5% Albumin started as ordered. Needs more instruction with IS. Able to inhale approx 250 ml.

## 2020-03-25 NOTE — DISCHARGE INSTR - COC
benign I10    Uncontrolled type 2 diabetes mellitus with hyperglycemia ([facility]) Q97.99    Acute systolic (congestive) heart failure (HCC) I50.21    Acute respiratory failure with hypoxia ([facility]) J96.01    Aspiration pneumonia ([facility]) J69.0    Acute encephalopathy G93.40    DM (diabetes mellitus), type 2, uncontrolled with complications ([facility]) N22.2, N55.50    Metabolic alkalosis A27.6    Hypernatremia E87.0    Acute delirium R41.0    Ischemic cardiomyopathy I25.5    Hypokalemia E87.6    S/P coronary artery bypass graft x 3 Z95.1       Isolation/Infection:   Isolation          No Isolation        Patient Infection Status     None to display          Nurse Assessment:  Last Vital Signs: BP 95/64   Pulse 91   Temp 97.8 °F (36.6 °C) (Core)   Resp 15   Ht 5' 7.5\" (1.715 m)   Wt 171 lb 15.3 oz (78 kg)   SpO2 95%   BMI 26.54 kg/m²     Last documented pain score (0-10 scale): Pain Level: 5  Last Weight:   Wt Readings from Last 1 Encounters:   03/25/20 171 lb 15.3 oz (78 kg)     Mental Status:  oriented, alert, thought processes intact and able to concentrate and follow conversation    IV Access:  - None    Nursing Mobility/ADLs:  Walking   Independent  Transfer  Independent  Bathing  Independent  Dressing  Independent  Toileting  Independent  Feeding  [address]  Independent  Med Delivery   whole    Wound Care Documentation and Therapy:        Elimination:  Continence:   · Bowel: Yes  · Bladder: Yes  Urinary Catheter: Removal Date 3/26/2020   Colostomy/Ileostomy/Ileal Conduit: No       Date of Last BM: 3/27/2020    Intake/Output Summary (Last 24 hours) at 3/25/2020 1026  Last data filed at 3/25/2020 0955  Gross per 24 hour   Intake 4443 ml   Output 3560 ml   Net 883 ml     I/O last 3 completed shifts: In: 7869 [P.O.:360; I.V.:3583; Blood:500]  Out: 7235 [Urine:3085; Chest Tube:350]    Safety Concerns:      At Risk for Falls and Aspiration Risk    Impairments/Disabilities:      Speech Dysphagia diet, deep breath every hour while awake. 6.  Pulse Ox checks with each visit for home care and with vital signs for ECF. CALL if pulse ox less than 90%. 7.  Keep acticity log (ambulate as directed and bring log to follow up appointment.)  8. Daily weights  9. No tub bath. Call the surgeon at [phone] for any for any of the following reasons:  · Changes in incision (increased redness, tenderness, warmth or drainage)  · Call for pain not relieved with pain medication  · Call for temp >101, HR <50 or >110 beats/min, SBP < 90 or >160. · No bowel movement for 3 days  · Daily weights: call if 2 pound weight gain in 24 hours or 5 pound weight gain in 1 week. · Call for persistent diarrhea, nausea, or vomiting.   · Pain, tenderness, warmth, or redness in calf  · Call for symptomatic fluttering in chest, irregular pulse, dyspnea  · DEPRESSION: Call Primary Care Physician if feelings of depression and depression persists  · DIABETICS: Call Primary Care Physician for blood sugars >130 consistently     Patient's personal belongings (please select all that are sent with patient):  Glasses    RN SIGNATURE:  Electronically signed by Susi Lynn RN on 3/29/20 at 7:26 AM EDT    CASE MANAGEMENT/SOCIAL WORK SECTION    Inpatient Status Date: 15 MAR 2020    Readmission Risk Assessment Score:  Readmission Risk              Risk of Unplanned Readmission:        25           Discharging to Facility/ [address] [place]       Phone -[phone]             / signature: PADMA London, .[phone]      PHYSICIAN SECTION    Prognosis: Good    Condition at Discharge: Stable    Rehab Potential (if transferring to Rehab): Good    Recommended Labs or Other Treatments After Discharge:     Physician Certification: I certify the above information and transfer of Mera Charles  is necessary for the continuing treatment of the diagnosis listed and that he requires Home Care for less 30 days.      Update Admission H&P: No change in H&P    PHYSICIAN SIGNATURE:  Electronically signed by VINI Redmond CNP / Zandra Mckeon MD on 3/27/20 at 10:27 AM EDT Electronically signed by Zandra Mckeon MD on 3/29/2020 at 8:05 AM

## 2020-03-25 NOTE — PROGRESS NOTES
Nutrition Assessment    Type and Reason for Visit: Reassess    Nutrition Recommendations:   1. Continue current diet  2. Encourage po  3. RD reordered Boost Pudding BID    Nutrition Assessment: Pt is at risk for nutritional compromise d/t increased nutrient needs for wound healing. Pt s/p CABG. Pt appetite is slow to improve. PO had been 26-50% at meals. SLP is following with pt. Malnutrition Assessment:  · Malnutrition Status: No malnutrition  · Context: Acute illness or injury  · Findings of the 6 clinical characteristics of malnutrition (Minimum of 2 out of 6 clinical characteristics is required to make the diagnosis of moderate or severe Protein Calorie Malnutrition based on AND/ASPEN Guidelines):  1. Energy Intake-Less than or equal to 50% of estimated energy requirement, (at least 3 days)    2. Weight Loss-Unable to assess,    3. Fat Loss-No significant subcutaneous fat loss,    4. Muscle Loss-No significant muscle mass loss,    5. Fluid Accumulation-No significant fluid accumulation,    6.  Strength-Not measured    Nutrition Risk Level: High    Nutrient Needs:  · Estimated Daily Total Kcal: 0051-0955  · Estimated Daily Protein (g):  gms(1.3-1.5 gms)  · Estimated Daily Total Fluid (ml/day): 1 ml/kcal    Nutrition Diagnosis:   · Problem: Increased nutrient needs  · Etiology: related to Increased demand for energy/nutrients     Signs and symptoms:  as evidenced by Presence of wounds(s/p CABG)    Objective Information:  · Nutrition-Focused Physical Findings: No edema  · Wound Type: Surgical Wound  · Current Nutrition Therapies:  · Oral Diet Orders: 2gm Sodium, Dysphagia  Soft and Bite-Sized (Dysphagia 3), Carb Control 4 Carbs/Meal, No Straws   · Oral Diet intake: 26-50%  · Oral Nutrition Supplement (ONS) Orders:  Other Oral Supplement (See Comment)(Boost pudding)  · ONS intake: 26-50%  · Anthropometric Measures:  · Ht: 5' 7.5\" (171.5 cm)   · Current Body Wt: 171 lb (77.6 kg)  · Ideal Body Wt: 151 lb (68.5 kg), % Ideal Body    · BMI Classification: BMI 25.0 - 29.9 Overweight    Nutrition Interventions:   Continue current diet, Start ONS  Continued Inpatient Monitoring, Education Not Indicated, Speech Therapy    Nutrition Evaluation:   · Evaluation: Progressing toward goals   · Goals: PO 50% or more at meals/supp    · Monitoring: Meal Intake, Supplement Intake, Weight, I&O, Wound Healing, Skin Integrity      Electronically signed by Angeli Wilder RD, CNSC, LD on 3/25/20 at 12:29 PM EDT    Contact Number: [phone]

## 2020-03-25 NOTE — PROGRESS NOTES
Order received to transition to stepdown level of care. Procedures explained to patient. R IJ Irving Ferny discontinued and obturator inserted. Patient tolerated well and minimal ectopy on monitor. Arterial line left in place at this time per verbal order from NPYue. Patient tolerated well.        Alfaro Risk  3/25/2020

## 2020-03-25 NOTE — PROGRESS NOTES
Occupational Therapy   Occupational Therapy Re-Assessment  Date: 3/25/2020   Patient Name: Rachelle Pro  MRN: [id number]     : 1955    Date of Service: 3/25/2020    Discharge Recommendations:Mart Donald scored a 17/24 on the AM-PAC ADL Inpatient form. Current research shows that an AM-PAC score of 17 or less is typically not associated with a discharge to the patient's home setting. Based on the patients AM-PAC score and their current ADL deficits, it is recommended that the patient have 5-7 sessions per week of Occupational Therapy at d/c to increase the patients independence. If patient discharges prior to next session this note will serve as a discharge summary. Please see below for the latest assessment towards goals. Assessment   Performance deficits / Impairments: Decreased functional mobility ; Decreased strength;Decreased endurance;Decreased high-level IADLs;Decreased ADL status; Decreased safe awareness;Decreased cognition;Decreased balance  Assessment: Patient is a 58yr old male admitted for SOB requiring intubation for 8 days. Patient's PLOF is independent with all ADLs. Patient currently requires CGA- min for transfers and mobility. Patient would benefit from ongoing OT in order to increase functional status   Treatment Diagnosis: Decreased ADLs, IADLs, transfers, mobility associated with CHF  Prognosis: Good  OT Education: Transfer Training;OT Role  Patient Education: pt demonstrated understanding, discussed sternal precautions as patient prepares for CABG  Barriers to Learning: cognition   REQUIRES OT FOLLOW UP: Yes  Activity Tolerance  Activity Tolerance: Patient Tolerated treatment well  Safety Devices  Safety Devices in place: Yes  Type of devices: All fall risk precautions in place; Patient at risk for falls; Left in chair;Call light within reach;Nurse notified; Chair alarm in place;Gait belt           Patient Diagnosis(es): There were no encounter diagnoses.      has a past Independent  Homemaking Assistance: Independent  Homemaking Responsibilities: Yes  Ambulation Assistance: Independent  Transfer Assistance: Independent  Active : Yes  Mode of Transportation: Truck  Occupation: Full time employment  Additional Comments: Pt denies recent falls       Objective   Vision: Within Functional Limits  Hearing: Within functional limits    Orientation  Overall Orientation Status: Within Functional Limits  Orientation Level: Oriented X4     Balance  Sitting Balance: Supervision  Standing Balance: Minimal assistance  Standing Balance  Time: ~2 minutes (standing at side of bed, stand step bed>recliner) Leo  Wheelchair Bed Transfers  Wheelchair/Bed - Technique: Stand step  Equipment Used: Bed;Other  Level of Asssistance: Minimal assistance  ADL  Feeding: Modified independent   Tone RUE  RUE Tone: Normotonic  Tone LUE  LUE Tone: Normotonic  Coordination  Movements Are Fluid And Coordinated: Yes     Bed mobility  Supine to Sit: Minimal assistance  Scooting: Minimal assistance  Comment: Increased time to perform, min verbal cues for sternal precautions  Transfers  Sit to stand: Minimal assistance  Stand to sit: Minimal assistance  Vision - Basic Assessment  Patient Visual Report: No visual complaint reported. Cognition  Overall Cognitive Status: WFL  Arousal/Alertness: Appropriate responses to stimuli  Following Commands:  Follows multistep commands with increased time  Attention Span: Attends with cues to redirect  Memory: Decreased recall of recent events  Safety Judgement: Decreased awareness of need for safety;Decreased awareness of need for assistance  Problem Solving: Assistance required to identify errors made;Assistance required to implement solutions;Assistance required to correct errors made;Assistance required to generate solutions  Insights: Fully aware of deficits  Initiation: Requires cues for some  Sequencing: Does not require cues  Perception  Overall Perceptual Status: [facility]

## 2020-03-25 NOTE — PROGRESS NOTES
Incentive Spirometry education and demonstration completed by Respiratory Therapy Yes      Response to education: Excellent     Teaching Time: 5 minutes    Minimum Predicted Vital Capacity - 661 mL. Patient's Actual Vital Capacity - 1000 mL. Turning over to Nursing for routine follow-up Yes.     Comments:     Electronically signed by Felipa Cunha on 3/25/2020 at 8:57 AM

## 2020-03-25 NOTE — PLAN OF CARE
Problem: HEMODYNAMIC STATUS  Goal: Patient has stable vital signs and fluid balance  3/25/2020 0535 by Ian Paul RN  Outcome: Ongoing   Pulse rate and rhythm, peripheral pulses, and capillary refill assessed every shift with assessment. General color and body temperature monitored throughout shift and with vitals. Assess for edema with head to toe assessment. Administer treatments and medications as ordered. Monitor patient's weight.

## 2020-03-25 NOTE — PROGRESS NOTES
Physical Therapy    Facility/Department: [facility]  Re-Assessment    NAME: Shauna Islas  : 1955  MRN: [id number]    Date of Service: 3/25/2020    Discharge Recommendations: Shauna Islas scored a 18/24 on the AM-PAC short mobility form. Current research shows that an AM-PAC score of 17 or less is typically not associated with a discharge to the patient's home setting. Based on the patients AM-PAC score and their current functional mobility deficits, it is recommended that the patient have 5-7 sessions per week of Physical Therapy at d/c to increase the patients independence. If patient discharges prior to next session this note will serve as a discharge summary. Please see below for the latest assessment towards goals. PT Equipment Recommendations  Equipment Needed: Yes(pending progression with therapy)  Mobility Devices: Felix Dynes: Rollator (4 Wheeled)    Assessment   Body structures, Functions, Activity limitations: Decreased functional mobility ; Decreased ADL status; Decreased endurance;Decreased strength;Decreased balance; Increased pain  Assessment: Pt requiring increased assist with bed mobility, transfers, and limited ambulation this date s/p CABG. Pt would benefit from continued skilled PT services to address deficits and allow for return to PLOF. Treatment Diagnosis: impaired gait, transfers, and balance  Prognosis: Good  Decision Making: Low Complexity  Clinical Presentation: stable  PT Education: Gait Training;Goals;PT Role;Plan of Care;General Safety;Transfer Training;Precautions; Functional Mobility Training  Patient Education: d/c recommendations, sternal precautions--pt verbalizing understanding  Barriers to Learning: none  REQUIRES PT FOLLOW UP: Yes  Activity Tolerance  Activity Tolerance: Patient Tolerated treatment well       Patient Diagnosis(es): There were no encounter diagnoses.      has a past medical history of CAD (coronary artery disease), CHF (congestive heart failure) ([facility] [address] Diabetes mellitus ([facility] [address] Hyperlipidemia, and Hypertension. has a past surgical history that includes Cardiac surgery; Colonoscopy; and Coronary artery bypass graft (N/A, 3/24/2020). Restrictions  Restrictions/Precautions  Restrictions/Precautions: Fall Risk(Medium fall risk)  Required Braces or Orthoses?: No  Position Activity Restriction  Sternal Precautions: No Pushing, No Pulling, 5# Lifting Restrictions  Other position/activity restrictions: Adwoa Blanchard is 60 y.o. male who presented with complaint of chest pain. Symptom onset was acute for a time period of 1 week. The severity is described as moderate. The course of his symptoms over time is worsening. The symptoms improved with rest and worsened with exertion. The patient's symptom is associated with SOB. CABG 3/24     Vision/Hearing  Vision: Within Functional Limits  Hearing: Within functional limits       Subjective  General  Chart Reviewed: Yes  Response To Previous Treatment: Patient with no complaints from previous session. Family / Caregiver Present: No  Diagnosis: CHF  Follows Commands: Within Functional Limits  General Comment  Comments: Pt supine in bed upon arrival. Agreeable to PT/OT re-eval.   Subjective  Subjective: Pt reporting no pain at this time.    Pain Screening  Patient Currently in Pain: Denies  Vital Signs  Patient Currently in Pain: Denies       Orientation  Orientation  Overall Orientation Status: Within Functional Limits     Social/Functional History  Social/Functional History  Lives With: Spouse  Type of Home: House  Home Layout: One level, Performs ADL's on one level, Able to Live on Main level with bedroom/bathroom  Home Access: Stairs to enter without rails  Entrance Stairs - Number of Steps: 1 ROMERO  Bathroom Shower/Tub: Walk-in shower  Bathroom Toilet: Handicap height  Bathroom Equipment: Grab bars in shower  Home Equipment: (None)  ADL Assistance: Independent  Homemaking Assistance: [facility] AM-PAC Score  AM-PAC Inpatient Mobility Raw Score : 18 (03/25/20 1621)  AM-PAC Inpatient T-Scale Score : 43.63 (03/25/20 1621)  Mobility Inpatient CMS 0-100% Score: 46.58 (03/25/20 1621)  Mobility Inpatient CMS G-Code Modifier : CK (03/25/20 1621)          Goals  Short term goals  Time Frame for Short term goals: To be met prior to discharge  Short term goal 1: Pt will complete bed mobility with mod I.  (Progressing)  Short term goal 2: Pt will complete sit to/from stand with supervision. --MET 3/23/2020  Short term goal 3: Pt will ambulate 100 ft with LRAD and supervision. --DAVID 3/23/2020  Short term goal 4: Pt will perform transfers with LRAD MOD I   Short term goal 5: Pt will ambulate 150' with LRAD MOD I        Therapy Time   Individual Concurrent Group Co-treatment   Time In 1404         Time Out 1418         Minutes 14              Timed Code Treatment Minutes:  14  Total Treatment Minutes:   Keren Ford [place], [place] [id number]

## 2020-03-25 NOTE — PROGRESS NOTES
CC: S/P CABG X 3/JORGE ALBERTO - 3/24    Doing well.   A&Ox3    Still on Dopa 1 mcg    CV:NSR 80s, sbp , CI 2.6, PAD 23, CVP 16,   Pulm:Diminsished Farrukh Bases, o/w CTA, O2 Sats 94% on RA, afebrile  Renal;K+ 4.7, Creat 0.7  Heme:WBC 21.3, H/H 11/34,   CT:350 since OR  WT:78/pre-op 74.3 Lasix 20 IV BID  UOP:3085 since OR  Incision:C/D/I sternum stable    As per CC:    Plan: Wean Dopa, Okay to transition, leave A-Line until off Dopa, I/S, cont diuresis, 100cc 25% albumin

## 2020-03-26 ENCOUNTER — APPOINTMENT (OUTPATIENT)
Dept: GENERAL RADIOLOGY | Age: 65
DRG: 233 | End: 2020-03-26
Attending: INTERNAL MEDICINE
Payer: COMMERCIAL

## 2020-03-26 LAB
GLUCOSE BLD-MCNC: 105 MG/DL (ref 70–99)
GLUCOSE BLD-MCNC: 110 MG/DL (ref 70–99)
GLUCOSE BLD-MCNC: 115 MG/DL (ref 70–99)
GLUCOSE BLD-MCNC: 117 MG/DL (ref 70–99)
GLUCOSE BLD-MCNC: 127 MG/DL (ref 70–99)
GLUCOSE BLD-MCNC: 128 MG/DL (ref 70–99)
GLUCOSE BLD-MCNC: 179 MG/DL (ref 70–99)
GLUCOSE BLD-MCNC: 209 MG/DL (ref 70–99)
GLUCOSE BLD-MCNC: 288 MG/DL (ref 70–99)
GLUCOSE BLD-MCNC: 296 MG/DL (ref 70–99)
MAGNESIUM: 2.3 MG/DL (ref 1.8–2.4)
PERFORMED ON: ABNORMAL

## 2020-03-26 PROCEDURE — 37799 UNLISTED PX VASCULAR SURGERY: CPT

## 2020-03-26 PROCEDURE — 6360000002 HC RX W HCPCS: Performed by: THORACIC SURGERY (CARDIOTHORACIC VASCULAR SURGERY)

## 2020-03-26 PROCEDURE — 99024 POSTOP FOLLOW-UP VISIT: CPT | Performed by: THORACIC SURGERY (CARDIOTHORACIC VASCULAR SURGERY)

## 2020-03-26 PROCEDURE — 71045 X-RAY EXAM CHEST 1 VIEW: CPT

## 2020-03-26 PROCEDURE — 6360000002 HC RX W HCPCS: Performed by: NURSE PRACTITIONER

## 2020-03-26 PROCEDURE — 92526 ORAL FUNCTION THERAPY: CPT

## 2020-03-26 PROCEDURE — 97530 THERAPEUTIC ACTIVITIES: CPT

## 2020-03-26 PROCEDURE — 99233 SBSQ HOSP IP/OBS HIGH 50: CPT | Performed by: INTERNAL MEDICINE

## 2020-03-26 PROCEDURE — 6370000000 HC RX 637 (ALT 250 FOR IP): Performed by: THORACIC SURGERY (CARDIOTHORACIC VASCULAR SURGERY)

## 2020-03-26 PROCEDURE — 2000000000 HC ICU R&B

## 2020-03-26 PROCEDURE — 97116 GAIT TRAINING THERAPY: CPT

## 2020-03-26 PROCEDURE — 6370000000 HC RX 637 (ALT 250 FOR IP): Performed by: NURSE PRACTITIONER

## 2020-03-26 PROCEDURE — 2580000003 HC RX 258: Performed by: THORACIC SURGERY (CARDIOTHORACIC VASCULAR SURGERY)

## 2020-03-26 PROCEDURE — 83735 ASSAY OF MAGNESIUM: CPT

## 2020-03-26 PROCEDURE — 94669 MECHANICAL CHEST WALL OSCILL: CPT

## 2020-03-26 PROCEDURE — P9047 ALBUMIN (HUMAN), 25%, 50ML: HCPCS | Performed by: NURSE PRACTITIONER

## 2020-03-26 RX ADMIN — FUROSEMIDE 20 MG: 10 INJECTION, SOLUTION INTRAMUSCULAR; INTRAVENOUS at 08:22

## 2020-03-26 RX ADMIN — FUROSEMIDE 20 MG: 10 INJECTION, SOLUTION INTRAMUSCULAR; INTRAVENOUS at 17:33

## 2020-03-26 RX ADMIN — ACETAMINOPHEN 1000 MG: 500 TABLET, FILM COATED ORAL at 03:14

## 2020-03-26 RX ADMIN — OXYCODONE 5 MG: 5 TABLET ORAL at 08:21

## 2020-03-26 RX ADMIN — OXYCODONE 5 MG: 5 TABLET ORAL at 03:17

## 2020-03-26 RX ADMIN — Medication 10 ML: at 21:27

## 2020-03-26 RX ADMIN — FONDAPARINUX SODIUM 2.5 MG: 2.5 INJECTION, SOLUTION SUBCUTANEOUS at 08:22

## 2020-03-26 RX ADMIN — POLYETHYLENE GLYCOL 3350 17 G: 17 POWDER, FOR SOLUTION ORAL at 21:19

## 2020-03-26 RX ADMIN — INSULIN LISPRO 6 UNITS: 100 INJECTION, SOLUTION INTRAVENOUS; SUBCUTANEOUS at 11:39

## 2020-03-26 RX ADMIN — INSULIN LISPRO 6 UNITS: 100 INJECTION, SOLUTION INTRAVENOUS; SUBCUTANEOUS at 17:34

## 2020-03-26 RX ADMIN — ACETAMINOPHEN 1000 MG: 500 TABLET, FILM COATED ORAL at 21:19

## 2020-03-26 RX ADMIN — METOPROLOL TARTRATE 12.5 MG: 25 TABLET, FILM COATED ORAL at 08:21

## 2020-03-26 RX ADMIN — SENNOSIDES AND DOCUSATE SODIUM 1 TABLET: 8.6; 5 TABLET ORAL at 08:21

## 2020-03-26 RX ADMIN — POTASSIUM CHLORIDE 10 MEQ: 750 TABLET, FILM COATED, EXTENDED RELEASE ORAL at 11:40

## 2020-03-26 RX ADMIN — POTASSIUM CHLORIDE 10 MEQ: 750 TABLET, FILM COATED, EXTENDED RELEASE ORAL at 08:22

## 2020-03-26 RX ADMIN — ACETAMINOPHEN 1000 MG: 500 TABLET, FILM COATED ORAL at 16:10

## 2020-03-26 RX ADMIN — INSULIN LISPRO 1 UNITS: 100 INJECTION, SOLUTION INTRAVENOUS; SUBCUTANEOUS at 21:26

## 2020-03-26 RX ADMIN — INSULIN LISPRO 4 UNITS: 100 INJECTION, SOLUTION INTRAVENOUS; SUBCUTANEOUS at 17:36

## 2020-03-26 RX ADMIN — ACETAMINOPHEN 1000 MG: 500 TABLET, FILM COATED ORAL at 08:21

## 2020-03-26 RX ADMIN — ALBUMIN (HUMAN) 25 G: 0.25 INJECTION, SOLUTION INTRAVENOUS at 04:47

## 2020-03-26 RX ADMIN — ASPIRIN 325 MG: 325 TABLET, COATED ORAL at 08:21

## 2020-03-26 RX ADMIN — METOPROLOL TARTRATE 12.5 MG: 25 TABLET, FILM COATED ORAL at 09:59

## 2020-03-26 RX ADMIN — ALBUMIN (HUMAN) 25 G: 0.25 INJECTION, SOLUTION INTRAVENOUS at 21:19

## 2020-03-26 RX ADMIN — CEFAZOLIN 2 G: 1 INJECTION, POWDER, FOR SOLUTION INTRAMUSCULAR; INTRAVENOUS at 00:48

## 2020-03-26 RX ADMIN — INSULIN LISPRO 6 UNITS: 100 INJECTION, SOLUTION INTRAVENOUS; SUBCUTANEOUS at 11:38

## 2020-03-26 RX ADMIN — POTASSIUM CHLORIDE 10 MEQ: 750 TABLET, FILM COATED, EXTENDED RELEASE ORAL at 17:38

## 2020-03-26 RX ADMIN — Medication 400 MG: at 21:19

## 2020-03-26 RX ADMIN — Medication 400 MG: at 08:21

## 2020-03-26 RX ADMIN — MUPIROCIN: 20 OINTMENT TOPICAL at 08:23

## 2020-03-26 RX ADMIN — MUPIROCIN: 20 OINTMENT TOPICAL at 21:27

## 2020-03-26 RX ADMIN — ALBUMIN (HUMAN) 25 G: 0.25 INJECTION, SOLUTION INTRAVENOUS at 13:04

## 2020-03-26 RX ADMIN — PANTOPRAZOLE SODIUM 40 MG: 40 TABLET, DELAYED RELEASE ORAL at 08:21

## 2020-03-26 RX ADMIN — INSULIN GLARGINE 25 UNITS: 100 INJECTION, SOLUTION SUBCUTANEOUS at 21:26

## 2020-03-26 RX ADMIN — Medication 10 ML: at 08:23

## 2020-03-26 RX ADMIN — OXYCODONE 10 MG: 5 TABLET ORAL at 13:04

## 2020-03-26 RX ADMIN — METOPROLOL TARTRATE 25 MG: 25 TABLET, FILM COATED ORAL at 21:19

## 2020-03-26 RX ADMIN — SENNOSIDES AND DOCUSATE SODIUM 1 TABLET: 8.6; 5 TABLET ORAL at 21:19

## 2020-03-26 ASSESSMENT — PAIN SCALES - GENERAL
PAINLEVEL_OUTOF10: 0
PAINLEVEL_OUTOF10: 5
PAINLEVEL_OUTOF10: 7
PAINLEVEL_OUTOF10: 5
PAINLEVEL_OUTOF10: 0
PAINLEVEL_OUTOF10: 5
PAINLEVEL_OUTOF10: 5

## 2020-03-26 ASSESSMENT — PAIN DESCRIPTION - ONSET: ONSET: ON-GOING

## 2020-03-26 ASSESSMENT — PAIN DESCRIPTION - DESCRIPTORS
DESCRIPTORS: ACHING
DESCRIPTORS: ACHING;DISCOMFORT

## 2020-03-26 ASSESSMENT — PAIN DESCRIPTION - ORIENTATION: ORIENTATION: MID

## 2020-03-26 ASSESSMENT — PAIN DESCRIPTION - LOCATION
LOCATION: CHEST;INCISION
LOCATION: CHEST;INCISION

## 2020-03-26 NOTE — PROGRESS NOTES
D:  Report received from previous shift RN, Sergio Waldrop. See nursing flow sheet for assessment and document flow sheet for vital signs/hemodynamic status. A:  Instructed patient on plan of care and goals for the day. R: Patient verbalized understanding.

## 2020-03-26 NOTE — CARE COORDINATION
CM spoke w/ [facility] re: Onofre Portillo unable to accept patient as referral.  Per, Maxim Silva, [employer] accepted patient. If patient d/c over weekend, look in patient's mailbox on floor for prepared packet to fax to [employer] at [phone]. Still needs AVS/SANDRA to be faxed with. Case management will continue to follow progress and update discharge plan as needed.     Javad Lebron, CALVINN, .[phone]

## 2020-03-26 NOTE — PROGRESS NOTES
[facility] Daily Progress Note      Admit Date:  3/13/2020    No chief complaint on file. Subjective:  Mr. Angely Julien denies exertional chest pain, SOB/BARBER, PND, palpitations, light-headedness, or edema. Incision CDI. Pain well controlled. OOB to chair. Chest tubes and munoz removed. Objective:   /61   Pulse 96   Temp 97.6 °F (36.4 °C) (Temporal)   Resp 20   Ht 5' 7.5\" (1.715 m)   Wt 174 lb 2.6 oz (79 kg)   SpO2 93%   BMI 26.88 kg/m²       Intake/Output Summary (Last 24 hours) at 3/26/2020 1302  Last data filed at 3/26/2020 0858  Gross per 24 hour   Intake 782 ml   Output 3135 ml   Net -2353 ml       TELEMETRY: Sinus     Physical Exam:  General:  Awake, alert, oriented x 3, NAD  Skin:  Warm and dry  Neck:  JVD flat. Triple lumen in place  Chest:  normal air entry.  Sternal would CDI, healing well  Cardiovascular:  RRR S1S2, no S3, no mrmr/rub  Abdomen:  Soft, ND, NT, No HSM  Extremities:  No edema    Medications:    metoprolol tartrate  25 mg Oral BID    albumin human  25 g Intravenous Q8H    insulin glargine  25 Units Subcutaneous Nightly    sodium chloride flush  10 mL Intravenous 2 times per day    acetaminophen  1,000 mg Oral Q6H    pantoprazole  40 mg Oral Daily    furosemide  20 mg Intravenous BID    magnesium oxide  400 mg Oral BID    mupirocin   Nasal BID    nitroGLYCERIN  1 patch Transdermal Daily    potassium chloride  10 mEq Oral TID WC    aspirin  325 mg Oral Daily    insulin lispro  0-12 Units Subcutaneous TID WC    insulin lispro  0-6 Units Subcutaneous Nightly    sennosides-docusate sodium  1 tablet Oral BID    [START ON 3/27/2020] losartan  25 mg Oral Lunch    fondaparinux  2.5 mg Subcutaneous Daily    insulin lispro  0.08 Units/kg Subcutaneous TID WC      dextrose       sodium chloride flush, potassium chloride, oxyCODONE **OR** oxyCODONE, morphine, diphenhydrAMINE, zolpidem, ondansetron, metoclopramide, albuterol sulfate HFA, albumin human,

## 2020-03-26 NOTE — PLAN OF CARE
Problem: Falls - Risk of:  Goal: Will remain free from falls  Description: Will remain free from falls  Outcome: Ongoing  Note: Pt free from falls this shift, bed side table next to bed, call light in reach, bed in lowest position, wheels locked. Encouraged to call for any assistance      Problem: Pain:  Goal: Control of acute pain  Description: Control of acute pain  Outcome: Ongoing  Note: Pt alert and oriented. Pt able to communicate present pain and use the pain scale appropriately. Nonpharmacological pain reducers and pain medication offered as needed. Will cont to monitor.

## 2020-03-26 NOTE — PROGRESS NOTES
Role;Plan of Care;General Safety;Transfer Training;Precautions; Functional Mobility Training  Patient Education: d/c recommendations, sternal precautions--pt verbalizing understanding  Barriers to Learning: none  REQUIRES PT FOLLOW UP: Yes  Activity Tolerance  Activity Tolerance: Patient Tolerated treatment well     Patient Diagnosis(es): There were no encounter diagnoses. has a past medical history of CAD (coronary artery disease), CHF (congestive heart failure) ([facility] Diabetes mellitus ([facility] Hyperlipidemia, and Hypertension. has a past surgical history that includes Cardiac surgery; Colonoscopy; and Coronary artery bypass graft (N/A, 3/24/2020). Restrictions  Restrictions/Precautions  Restrictions/Precautions: Fall Risk(Medium fall risk)  Required Braces or Orthoses?: No  Position Activity Restriction  Sternal Precautions: No Pushing, No Pulling, 5# Lifting Restrictions  Other position/activity restrictions: Rossy Stringer is 60 y.o. male who presented with complaint of chest pain. Symptom onset was acute for a time period of 1 week. The severity is described as moderate. The course of his symptoms over time is worsening. The symptoms improved with rest and worsened with exertion. The patient's symptom is associated with SOB. CABG 3/24     Subjective   General  Chart Reviewed: Yes  Response To Previous Treatment: Patient with no complaints from previous session. Family / Caregiver Present: No  Subjective  Subjective: Pt reporting no pain at this time. General Comment  Comments: Pt ambulating in room upon arrival. Pt requesting a new pair of pants, noted to be soaked with urine. Pt assisted with changing pants. Pain Screening  Patient Currently in Pain: Denies  Vital Signs  Patient Currently in Pain: Denies       Orientation  Orientation  Overall Orientation Status: Within Functional Limits     Objective   Bed mobility  Comment: Not addressed this session.    Transfers  Sit to Stand: Supervision(x2 recliner, x1 toilet)  Stand to sit: Supervision  Comment: Mod VC for sternal precautions as pt attempting to stand from recliner by pushing off of armrests. Ambulation  Ambulation?: Yes  More Ambulation?: Yes  Ambulation 1  Surface: level tile  Device: No Device  Assistance: Contact guard assistance  Quality of Gait: wide Teresa, decreased vanessa, slight sway noted  Distance: 40'+40'+20'  Comments: Pt required slightly increased time to perform. No LOB despite sway. Ambulation 2  Surface - 2: level tile  Device 2: Rollator  Assistance 2: Supervision  Quality of Gait 2: widened Teresa, slightly increased sway noted  Distance: 300'  Comments: Pt able to negotiate obstacles without assist. No LOB. Pt prefering to use rollator in hallway. Stairs/Curb  Stairs?: Yes  Stairs  # Steps : 3  Stairs Height: 6\"  Rails: Left ascending  Assistance: Stand by assistance  Comment: Negotiated with reciprocal gait, no LOB. Balance  Posture: Good  Sitting - Static: Good  Sitting - Dynamic: Good(able to maintain seated balance independently at toilet while RN assisting with dependent doffing of LASHAWN hose and donning LASHAWN hose ~3-4 minutes total)  Standing - Static: Fair;+  Standing - Dynamic: Fair;+            G-Code     OutComes Score                   AM-PAC Score  AM-PAC Inpatient Mobility Raw Score : 18 (03/26/20 1622)  AM-PAC Inpatient T-Scale Score : 43.63 (03/26/20 1622)  Mobility Inpatient CMS 0-100% Score: 46.58 (03/26/20 1622)  Mobility Inpatient CMS G-Code Modifier : CK (03/26/20 1622)          Goals  Short term goals  Time Frame for Short term goals: To be met prior to discharge  Short term goal 1: Pt will complete bed mobility with mod I.  (Progressing)  Short term goal 2: Pt will complete sit to/from stand with supervision. --MET 3/23/2020  Short term goal 3: Pt will ambulate 100 ft with LRAD and supervision. --MET 3/23/2020  Short term goal 4: Pt will perform transfers with LRAD MOD I   Short term goal 5: Pt will

## 2020-03-26 NOTE — PROGRESS NOTES
CC: s/p CABG x 3 and lig JORGE ALBERTO    No c/o  NSR  CT minimal  CTAB RRR sternum stable  As per CC  D/C chest tubes and munoz  Continue diuresis  Increase activity

## 2020-03-26 NOTE — PROGRESS NOTES
4 - 12 Cans of beer per week    Drug use: Never       ALLERGIES  Allergies   Allergen Reactions    Lipitor [Atorvastatin]        MEDICATIONS  No current facility-administered medications on file prior to encounter.       Current Outpatient Medications on File Prior to Encounter   Medication Sig Dispense Refill    glipiZIDE (GLUCOTROL) 10 MG tablet Take 10 mg by mouth 2 times daily (before meals)      loperamide (IMODIUM) 2 MG capsule Take 2 mg by mouth daily      mesalamine (APRISO) 0.375 g extended release capsule Take 1.5 g by mouth daily      losartan (COZAAR) 25 MG tablet Take 25 mg by mouth daily      metoprolol tartrate (LOPRESSOR) 25 MG tablet Take 25 mg by mouth 2 times daily      tiZANidine (ZANAFLEX) 4 MG tablet Take 4 mg by mouth every 8 hours as needed      aspirin 81 MG tablet Take 81 mg by mouth daily      meclizine (ANTIVERT) 25 MG tablet Take 25 mg by mouth 3 times daily as needed         Objective:     LABS    CBC:   Lab Results   Component Value Date    WBC 21.3 03/25/2020    RBC 3.85 03/25/2020    HGB 11.6 03/25/2020    HCT 34.7 03/25/2020    MCV 90.0 03/25/2020    MCH 30.1 03/25/2020    MCHC 33.4 03/25/2020    RDW 12.8 03/25/2020     03/25/2020    MPV 9.3 03/25/2020     CMP:    Lab Results   Component Value Date     03/25/2020    K 4.7 03/25/2020    K 3.9 03/24/2020     03/25/2020    CO2 23 03/25/2020    BUN 15 03/25/2020    CREATININE 0.7 03/25/2020    GFRAA >60 03/25/2020    LABGLOM >60 03/25/2020    GLUCOSE 100 03/25/2020    PROT 7.0 03/17/2020    LABALBU 3.9 03/17/2020    CALCIUM 8.2 03/25/2020    BILITOT <0.2 03/17/2020    ALKPHOS 76 03/17/2020    AST 47 03/17/2020    ALT 53 03/17/2020       HgBA1c:    Lab Results   Component Value Date    LABA1C 8.9 03/13/2020       This patient's last creatinine was   Recent Labs     03/25/20  0522   CREATININE 0.7*        Recent Blood sugars have been   Lab Results   Component Value Date    POCGLU 209 03/26/2020    POCGLU 288 03/26/2020    POCGLU 296 03/26/2020    POCGLU 127 03/26/2020    POCGLU 117 03/26/2020    POCGLU 105 03/26/2020    POCGLU 110 03/26/2020    POCGLU 128 03/26/2020     Lab Results   Component Value Date    GLUCOSE 100 03/25/2020    GLUCOSE 127 03/24/2020    GLUCOSE 142 03/24/2020    GLUCOSE 150 03/23/2020    GLUCOSE 156 03/22/2020    GLUCOSE 152 03/21/2020            Assessment:     Patient Active Problem List   Diagnosis    Congestive heart failure of unknown etiology ([facility]    3-vessel coronary artery disease    NSTEMI (non-ST elevated myocardial infarction) ([facility]    HTN (hypertension), benign    Uncontrolled type 2 diabetes mellitus with hyperglycemia (HCC)    Acute systolic (congestive) heart failure (HCC)    Acute respiratory failure with hypoxia (HCC)    [facility]    Acute encephalopathy    DM (diabetes mellitus), type 2, uncontrolled with complications (HCC)    Metabolic alkalosis    Hypernatremia    Acute delirium    Ischemic cardiomyopathy    Hypokalemia    S/P coronary artery bypass graft x 3       Plan:     Plan of Care:   Admit with CP, now S/P CABG  Diabetes Type 2 uncontrolled  Lipids-- last not on record  Renal- creatinine today 0.7, eGFR >60  ACE/ARB: no  Last eye exam: unknown    Nursing to assist patient with new insulin start education with each accucheck and insulin administration. Continue to monitor blood sugars to determine adequacy of current dosages  Recommend maintaining a smoke-free lifestyle. Discharge Plan:  Patient will need insulin for home which will be new: suggest full basal bolus insulin therapy  Patient to f/u with PCP. Instructed to log blood sugars and take blood sugar log to her f/u appointment. HOWIE MAURICIO [address] [place] [address] MSN, BSN, RN, CDE- Inpatient Diabetic Educator.

## 2020-03-26 NOTE — PROGRESS NOTES
[id number] home care referral. [facility] unable to staff @ this time. [facility] has accepted this referral.Discharge planner notified.

## 2020-03-26 NOTE — PROGRESS NOTES
(ongoing 3/26/2020)     Diet and Treatment Recommendations:  1.) Continue Dysphagia III Soft and Bite-Sized with thin liquids, no straws, meds in puree  2.) If difficulty is noted, recommend downgrade to NPO pending swallowing re-assessment   3.) Consider a Modified Barium Swallow Study to objectively evaluate swallowing physiology and determine the safest/least restrictive diet    Speech Language Treatment:  Impressions: Pt awake and alert during session. Pt oriented to all aspects. No dysarthria was noted this date. Speech Language STG, addressed this date: 1. Pt will improve articulatory precision and speech intelligibility in connected speech via graded tasks to 80% (GOAL MET, 3/25/2020)   2. Pt will improve auditory processing/comprehension of complex commands and questions to 80%, via graded tasks (ongoing 3/26/2020)   3. Pt will improve orientation and short term recall via graded tasks to 80% (ongoing 3/26/2020)   4. .Pt will participate in ongoing assessment of cognitive-linguistic skills as indicated. (ongoing 3/26/2020)     Patient/Family Education: Discussed recommendations and POC/goals with pt and RN, who verbalized understanding. Assessment: Patient progressing toward goals. Plan: Continue as per plan of care. Discharge Recommendations: Continue speech therapy for speech-language/cognitive-linguistic tx if goals are not met at time of d/c. Treatment time  Timed Code Treatment Minutes: 0 minutes  Total Treatment time: 10 minutes    If patient discharges prior to next session this note will serve as a discharge summary.       Carline Godinez M.A., [address] [place] [address]  Speech-Language Pathologist

## 2020-03-26 NOTE — PROGRESS NOTES
Chest tubes meet criteria to remove per open heart protocol. No air leak and no crepitus noted. Pt instructed on procedure. N premedication given  Dressings removed. Incision within normal limits. Site cleansed and prepped per protocol. Chest tubes X 2 removed without difficulty. Vaseline gauze and dry sterile dressing applied. Bilateral breath sounds audible. O2Sats 97% on 4 liters high flow. Patient tolerated well.   Portable chest x-ray ordered

## 2020-03-27 ENCOUNTER — APPOINTMENT (OUTPATIENT)
Dept: GENERAL RADIOLOGY | Age: 65
DRG: 233 | End: 2020-03-27
Attending: INTERNAL MEDICINE
Payer: COMMERCIAL

## 2020-03-27 LAB
ANION GAP SERPL CALCULATED.3IONS-SCNC: 13 MMOL/L (ref 3–16)
BLOOD BANK DISPENSE STATUS: NORMAL
BLOOD BANK PRODUCT CODE: NORMAL
BPU ID: NORMAL
BUN BLDV-MCNC: 24 MG/DL (ref 7–20)
CALCIUM SERPL-MCNC: 9.2 MG/DL (ref 8.3–10.6)
CHLORIDE BLD-SCNC: 104 MMOL/L (ref 99–110)
CO2: 27 MMOL/L (ref 21–32)
CREAT SERPL-MCNC: 0.7 MG/DL (ref 0.8–1.3)
DESCRIPTION BLOOD BANK: NORMAL
GFR AFRICAN AMERICAN: >60
GFR NON-AFRICAN AMERICAN: >60
GLUCOSE BLD-MCNC: 113 MG/DL (ref 70–99)
GLUCOSE BLD-MCNC: 160 MG/DL (ref 70–99)
GLUCOSE BLD-MCNC: 165 MG/DL (ref 70–99)
GLUCOSE BLD-MCNC: 191 MG/DL (ref 70–99)
GLUCOSE BLD-MCNC: 241 MG/DL (ref 70–99)
HCT VFR BLD CALC: 28.4 % (ref 40.5–52.5)
HEMOGLOBIN: 9.5 G/DL (ref 13.5–17.5)
MAGNESIUM: 2.3 MG/DL (ref 1.8–2.4)
MCH RBC QN AUTO: 30.2 PG (ref 26–34)
MCHC RBC AUTO-ENTMCNC: 33.5 G/DL (ref 31–36)
MCV RBC AUTO: 90 FL (ref 80–100)
PDW BLD-RTO: 13.1 % (ref 12.4–15.4)
PERFORMED ON: ABNORMAL
PLATELET # BLD: 185 K/UL (ref 135–450)
PMV BLD AUTO: 10 FL (ref 5–10.5)
POTASSIUM SERPL-SCNC: 4.1 MMOL/L (ref 3.5–5.1)
RBC # BLD: 3.15 M/UL (ref 4.2–5.9)
SODIUM BLD-SCNC: 144 MMOL/L (ref 136–145)
WBC # BLD: 12.7 K/UL (ref 4–11)

## 2020-03-27 PROCEDURE — 94669 MECHANICAL CHEST WALL OSCILL: CPT

## 2020-03-27 PROCEDURE — 6360000002 HC RX W HCPCS: Performed by: THORACIC SURGERY (CARDIOTHORACIC VASCULAR SURGERY)

## 2020-03-27 PROCEDURE — P9047 ALBUMIN (HUMAN), 25%, 50ML: HCPCS | Performed by: NURSE PRACTITIONER

## 2020-03-27 PROCEDURE — 80048 BASIC METABOLIC PNL TOTAL CA: CPT

## 2020-03-27 PROCEDURE — 99024 POSTOP FOLLOW-UP VISIT: CPT | Performed by: THORACIC SURGERY (CARDIOTHORACIC VASCULAR SURGERY)

## 2020-03-27 PROCEDURE — 2580000003 HC RX 258: Performed by: THORACIC SURGERY (CARDIOTHORACIC VASCULAR SURGERY)

## 2020-03-27 PROCEDURE — 74230 X-RAY XM SWLNG FUNCJ C+: CPT

## 2020-03-27 PROCEDURE — 6360000002 HC RX W HCPCS: Performed by: NURSE PRACTITIONER

## 2020-03-27 PROCEDURE — 97535 SELF CARE MNGMENT TRAINING: CPT

## 2020-03-27 PROCEDURE — 97116 GAIT TRAINING THERAPY: CPT

## 2020-03-27 PROCEDURE — 83735 ASSAY OF MAGNESIUM: CPT

## 2020-03-27 PROCEDURE — 85027 COMPLETE CBC AUTOMATED: CPT

## 2020-03-27 PROCEDURE — 92526 ORAL FUNCTION THERAPY: CPT

## 2020-03-27 PROCEDURE — 84443 ASSAY THYROID STIM HORMONE: CPT

## 2020-03-27 PROCEDURE — 2000000000 HC ICU R&B

## 2020-03-27 PROCEDURE — 92611 MOTION FLUOROSCOPY/SWALLOW: CPT

## 2020-03-27 PROCEDURE — 6370000000 HC RX 637 (ALT 250 FOR IP): Performed by: THORACIC SURGERY (CARDIOTHORACIC VASCULAR SURGERY)

## 2020-03-27 PROCEDURE — 99233 SBSQ HOSP IP/OBS HIGH 50: CPT | Performed by: INTERNAL MEDICINE

## 2020-03-27 PROCEDURE — 6370000000 HC RX 637 (ALT 250 FOR IP): Performed by: NURSE PRACTITIONER

## 2020-03-27 PROCEDURE — 97530 THERAPEUTIC ACTIVITIES: CPT

## 2020-03-27 RX ORDER — AMIODARONE HYDROCHLORIDE 200 MG/1
400 TABLET ORAL 2 TIMES DAILY
Status: DISCONTINUED | OUTPATIENT
Start: 2020-03-27 | End: 2020-03-29 | Stop reason: HOSPADM

## 2020-03-27 RX ORDER — POTASSIUM CHLORIDE 20 MEQ/1
20 TABLET, EXTENDED RELEASE ORAL 2 TIMES DAILY
Qty: 14 TABLET | Refills: 0 | Status: SHIPPED | OUTPATIENT
Start: 2020-03-27 | End: 2020-04-17 | Stop reason: ALTCHOICE

## 2020-03-27 RX ORDER — SENNA AND DOCUSATE SODIUM 50; 8.6 MG/1; MG/1
1 TABLET, FILM COATED ORAL DAILY
Qty: 30 TABLET | Refills: 0 | Status: SHIPPED | OUTPATIENT
Start: 2020-03-27 | End: 2020-04-26

## 2020-03-27 RX ORDER — FUROSEMIDE 40 MG/1
40 TABLET ORAL DAILY
Qty: 7 TABLET | Refills: 0 | Status: SHIPPED | OUTPATIENT
Start: 2020-03-27 | End: 2020-04-17 | Stop reason: ALTCHOICE

## 2020-03-27 RX ORDER — AMIODARONE HYDROCHLORIDE 200 MG/1
TABLET ORAL
Status: DISCONTINUED
Start: 2020-03-27 | End: 2020-03-27

## 2020-03-27 RX ORDER — AMIODARONE HYDROCHLORIDE 400 MG/1
TABLET ORAL
Qty: 56 TABLET | Refills: 0 | Status: SHIPPED | OUTPATIENT
Start: 2020-03-27 | End: 2020-04-17 | Stop reason: ALTCHOICE

## 2020-03-27 RX ORDER — LANOLIN ALCOHOL/MO/W.PET/CERES
400 CREAM (GRAM) TOPICAL 2 TIMES DAILY
Qty: 14 TABLET | Refills: 0 | Status: SHIPPED | OUTPATIENT
Start: 2020-03-27 | End: 2020-04-17 | Stop reason: ALTCHOICE

## 2020-03-27 RX ORDER — TRAMADOL HYDROCHLORIDE 50 MG/1
50 TABLET ORAL EVERY 4 HOURS PRN
Qty: 28 TABLET | Refills: 0 | Status: SHIPPED | OUTPATIENT
Start: 2020-03-27 | End: 2020-04-03

## 2020-03-27 RX ORDER — AMIODARONE HYDROCHLORIDE 200 MG/1
200 TABLET ORAL DAILY
Status: DISCONTINUED | OUTPATIENT
Start: 2020-04-01 | End: 2020-03-29 | Stop reason: HOSPADM

## 2020-03-27 RX ADMIN — AMIODARONE HYDROCHLORIDE 150 MG: 50 INJECTION, SOLUTION INTRAVENOUS at 03:14

## 2020-03-27 RX ADMIN — AMIODARONE HYDROCHLORIDE 1 MG/MIN: 50 INJECTION, SOLUTION INTRAVENOUS at 03:14

## 2020-03-27 RX ADMIN — POTASSIUM CHLORIDE 10 MEQ: 750 TABLET, FILM COATED, EXTENDED RELEASE ORAL at 10:52

## 2020-03-27 RX ADMIN — Medication 30 ML: at 21:04

## 2020-03-27 RX ADMIN — Medication 400 MG: at 10:52

## 2020-03-27 RX ADMIN — INSULIN GLARGINE 25 UNITS: 100 INJECTION, SOLUTION SUBCUTANEOUS at 21:00

## 2020-03-27 RX ADMIN — ACETAMINOPHEN 1000 MG: 500 TABLET, FILM COATED ORAL at 17:15

## 2020-03-27 RX ADMIN — ACETAMINOPHEN 1000 MG: 500 TABLET, FILM COATED ORAL at 03:14

## 2020-03-27 RX ADMIN — POTASSIUM CHLORIDE 10 MEQ: 750 TABLET, FILM COATED, EXTENDED RELEASE ORAL at 12:03

## 2020-03-27 RX ADMIN — INSULIN LISPRO 2 UNITS: 100 INJECTION, SOLUTION INTRAVENOUS; SUBCUTANEOUS at 12:32

## 2020-03-27 RX ADMIN — SENNOSIDES AND DOCUSATE SODIUM 1 TABLET: 8.6; 5 TABLET ORAL at 10:52

## 2020-03-27 RX ADMIN — ACETAMINOPHEN 1000 MG: 500 TABLET, FILM COATED ORAL at 10:50

## 2020-03-27 RX ADMIN — AMIODARONE HYDROCHLORIDE 0.5 MG/MIN: 50 INJECTION, SOLUTION INTRAVENOUS at 10:48

## 2020-03-27 RX ADMIN — SENNOSIDES AND DOCUSATE SODIUM 1 TABLET: 8.6; 5 TABLET ORAL at 20:57

## 2020-03-27 RX ADMIN — FONDAPARINUX SODIUM 2.5 MG: 2.5 INJECTION, SOLUTION SUBCUTANEOUS at 10:53

## 2020-03-27 RX ADMIN — AMIODARONE HYDROCHLORIDE 400 MG: 200 TABLET ORAL at 03:15

## 2020-03-27 RX ADMIN — METOPROLOL TARTRATE 25 MG: 25 TABLET, FILM COATED ORAL at 20:56

## 2020-03-27 RX ADMIN — ALBUMIN (HUMAN) 25 G: 0.25 INJECTION, SOLUTION INTRAVENOUS at 06:31

## 2020-03-27 RX ADMIN — INSULIN LISPRO 6 UNITS: 100 INJECTION, SOLUTION INTRAVENOUS; SUBCUTANEOUS at 09:14

## 2020-03-27 RX ADMIN — METOPROLOL TARTRATE 25 MG: 25 TABLET, FILM COATED ORAL at 10:51

## 2020-03-27 RX ADMIN — POLYETHYLENE GLYCOL 3350 17 G: 17 POWDER, FOR SOLUTION ORAL at 20:57

## 2020-03-27 RX ADMIN — OXYCODONE 5 MG: 5 TABLET ORAL at 17:16

## 2020-03-27 RX ADMIN — Medication 10 ML: at 09:00

## 2020-03-27 RX ADMIN — AMIODARONE HYDROCHLORIDE 0.5 MG/MIN: 50 INJECTION, SOLUTION INTRAVENOUS at 09:18

## 2020-03-27 RX ADMIN — MUPIROCIN: 20 OINTMENT TOPICAL at 10:52

## 2020-03-27 RX ADMIN — PANTOPRAZOLE SODIUM 40 MG: 40 TABLET, DELAYED RELEASE ORAL at 10:51

## 2020-03-27 RX ADMIN — INSULIN LISPRO 2 UNITS: 100 INJECTION, SOLUTION INTRAVENOUS; SUBCUTANEOUS at 21:00

## 2020-03-27 RX ADMIN — AMIODARONE HYDROCHLORIDE 400 MG: 200 TABLET ORAL at 20:56

## 2020-03-27 RX ADMIN — Medication 400 MG: at 20:57

## 2020-03-27 RX ADMIN — MUPIROCIN: 20 OINTMENT TOPICAL at 20:58

## 2020-03-27 RX ADMIN — POTASSIUM CHLORIDE 10 MEQ: 750 TABLET, FILM COATED, EXTENDED RELEASE ORAL at 17:16

## 2020-03-27 RX ADMIN — INSULIN LISPRO 2 UNITS: 100 INJECTION, SOLUTION INTRAVENOUS; SUBCUTANEOUS at 09:15

## 2020-03-27 RX ADMIN — INSULIN LISPRO 6 UNITS: 100 INJECTION, SOLUTION INTRAVENOUS; SUBCUTANEOUS at 12:29

## 2020-03-27 RX ADMIN — FUROSEMIDE 20 MG: 10 INJECTION, SOLUTION INTRAMUSCULAR; INTRAVENOUS at 17:17

## 2020-03-27 RX ADMIN — LOSARTAN POTASSIUM 25 MG: 25 TABLET ORAL at 12:03

## 2020-03-27 RX ADMIN — ASPIRIN 325 MG: 325 TABLET, COATED ORAL at 10:50

## 2020-03-27 RX ADMIN — OXYCODONE 5 MG: 5 TABLET ORAL at 12:03

## 2020-03-27 RX ADMIN — FUROSEMIDE 20 MG: 10 INJECTION, SOLUTION INTRAMUSCULAR; INTRAVENOUS at 10:50

## 2020-03-27 ASSESSMENT — PAIN SCALES - GENERAL
PAINLEVEL_OUTOF10: 5
PAINLEVEL_OUTOF10: 5
PAINLEVEL_OUTOF10: 3
PAINLEVEL_OUTOF10: 5
PAINLEVEL_OUTOF10: 0
PAINLEVEL_OUTOF10: 5
PAINLEVEL_OUTOF10: 5

## 2020-03-27 ASSESSMENT — PAIN DESCRIPTION - LOCATION
LOCATION: ABDOMEN;CHEST;INCISION
LOCATION: ABDOMEN;CHEST;INCISION
LOCATION: ABDOMEN;CHEST
LOCATION: ABDOMEN

## 2020-03-27 ASSESSMENT — PAIN DESCRIPTION - ORIENTATION: ORIENTATION: MID

## 2020-03-27 ASSESSMENT — PAIN DESCRIPTION - ONSET
ONSET: ON-GOING

## 2020-03-27 ASSESSMENT — PAIN DESCRIPTION - DESCRIPTORS
DESCRIPTORS: ACHING;DISCOMFORT

## 2020-03-27 NOTE — PROGRESS NOTES
[facility]   TRIAGE ASSESSMENT    Name: Gunner Leiva  MRN: [id number]  : 1969  Age: 49 y.o.  Date of assessment: 2019  PCP: No primary care provider on file.  Persons in attendance: Patient    CHIEF COMPLAINT/PRESENTING ISSUE (as stated by patient and pt's chart): 49 year old male BIB self with c/o paranoia, A/H x 1 day (man's voice telling him the voice is going to shoot him and woman's voices telling the man;s voice not to), has not slept in 3 nights; denies SI, HI, or self-harm ideation and pt is not on a legal hold; pt alert, orientated x 4; cooperative; slightly anxious; insight and judgment is intact; pt states he has been in [place] for a few days, took a bus from [place], [place], and is trying to get home to [place], [place], where he left approx 4 months ago; he has been traveling around [place] and the [facility]; states he has utilized other [facility] [employer] bus ticket programs and is willing to go the [employer] and apply for bus ticket; denies h/o SI, HI, or self-harm ideation; denies current outpt MH providers; states he has a previous inpt MH 2 times, last in an [facility] facility,  for depression and anxiety; states h/o taking RX psych meds for depression and anxiety but cannot remember which ones, last taken years ago; current substance use includes ETOH, 3 beers 2 x/week, last use 19; denies h/o aggression; with 1 arrest, , 2 weeks in long-term for drunk in public and resisting arrest; pt's full-time residence is in [place], [place]; 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 From [place] and stranded in [place]. pt reports hearing voices about 2 people having a  [facility] Daily Progress Note      Admit Date:  3/13/2020    No chief complaint on file. Shortness of breath    Subjective:  Mr. Angely Julien denies exertional chest pain, SOB/BARBER, PND, palpitations, light-headedness, or edema. Incision CDI. Pain well controlled. Ambulating the halls. Patient had episode of afib with RVR last night that resolved with amiodarone. Currently in NSR. Is slightly aspirating thin liquids, complains of cough. Hypoxic at night    Objective:   /75   Pulse 82   Temp 97.3 °F (36.3 °C) (Temporal)   Resp 15   Ht 5' 7.5\" (1.715 m)   Wt 173 lb 11.6 oz (78.8 kg)   SpO2 92%   BMI 26.81 kg/m²       Intake/Output Summary (Last 24 hours) at 3/27/2020 1010  Last data filed at 3/27/2020 0900  Gross per 24 hour   Intake 740 ml   Output 700 ml   Net 40 ml       TELEMETRY: Sinus     Physical Exam:  General:  Awake, alert, oriented x 3, NAD  Skin:  Warm and dry  Neck:  JVD flat. Triple lumen in place  Chest:  normal air entry.  Sternal would CDI, healing well  Cardiovascular:  RRR S1S2, no S3, no mrmr/rub  Abdomen:  Soft, ND, NT, No HSM  Extremities:  No edema    Medications:    amiodarone  400 mg Oral BID    Followed by   Jayden Galvez ON 4/1/2020] amiodarone  200 mg Oral Daily    amiodarone        metoprolol tartrate  25 mg Oral BID    insulin glargine  25 Units Subcutaneous Nightly    sodium chloride flush  10 mL Intravenous 2 times per day    acetaminophen  1,000 mg Oral Q6H    pantoprazole  40 mg Oral Daily    furosemide  20 mg Intravenous BID    magnesium oxide  400 mg Oral BID    mupirocin   Nasal BID    nitroGLYCERIN  1 patch Transdermal Daily    potassium chloride  10 mEq Oral TID WC    aspirin  325 mg Oral Daily    insulin lispro  0-12 Units Subcutaneous TID WC    insulin lispro  0-6 Units Subcutaneous Nightly    sennosides-docusate sodium  1 tablet Oral BID    losartan  25 mg Oral Lunch    fondaparinux  2.5 mg Subcutaneous Daily    insulin lispro  0.08 Units/kg Subcutaneous TID WC      amiodarone 450mg/250ml D5W infusion 0.5 mg/min (03/27/20 0918)    dextrose       sodium chloride flush, potassium chloride, oxyCODONE **OR** oxyCODONE, morphine, diphenhydrAMINE, zolpidem, ondansetron, metoclopramide, albuterol sulfate HFA, albumin human, furosemide, glucose, dextrose, glucagon (rDNA), dextrose, magnesium hydroxide, polyethylene glycol    Lab Data:  CBC:   Recent Labs     03/24/20  1313  03/25/20  0009 03/25/20  0522 03/27/20  0310   WBC 22.9*  --   --  21.3* 12.7*   HGB 13.9   < > 11.6* 11.6* 9.5*   HCT 42.0  --   --  34.7* 28.4*   MCV 89.8  --   --  90.0 90.0     --   --  228 185    < > = values in this interval not displayed. BMP:   Recent Labs     03/24/20  1313 03/25/20  0522 03/27/20  0310    144 144   K 3.8 4.7 4.1   * 111* 104   CO2 24 23 27   BUN 15 15 24*   CREATININE 0.6* 0.7* 0.7*     LIVER PROFILE: No results for input(s): AST, ALT, LIPASE, BILIDIR, BILITOT, ALKPHOS in the last 72 hours. Invalid input(s): AMYLASE,  ALB  PT/INR:   No results for input(s): PROTIME, INR in the last 72 hours. APTT:   No results for input(s): APTT in the last 72 hours. BNP:  No results for input(s): BNP in the last 72 hours. IMAGING:     Assessment/Plan:  Principal Problem:    NSTEMI (non-ST elevated myocardial infarction) ([facility]  Plan: Severe MVD. Acute RCA occlusion. S/P CABG X 3/JORGE ALBERTO - 3/24. POD 2. Cont lasix, lopressor, lisinopril, aspirin, statin. OOB, aggressive ambulation. Incentive spirometry    Acute systolic (congestive) heart failure (HCC)  Plan: bb, acei. lvef 40%. Afib: post op PAF. Cont amiodarone, aspirin. Monitor on tele.          Vanesa Whitney MD 3/27/2020 10:10 AM conversation and them saying that they are going to kill him for the past 2 days. pt denies drug use. explains to have been up for 2-3 days trying to find a way home. pt denies psych hx.    • Hallucinations     pt reports to be hearing voices   • Dehydration        CURRENT LIVING SITUATION/SOCIAL SUPPORT: states he has been in [place] for a few days, took a bus from [place], [place], and is trying to get home to [place], [place], where he left approx 4 months ago;  positive support system includes his friend, Deangelo, in [place], who pt may call to request money to buy a bus ticket home    BEHAVIORAL HEALTH TREATMENT HISTORY  Does patient/parent report a history of prior behavioral health treatment for patient?   Yes:    Dates Level of Care Facilty/Provider Diagnosis/Problem Medications   [facility] [place] [facility], [facility] unknown         SAFETY ASSESSMENT - SELF  Does patient acknowledge current or past symptoms of dangerousness to self? no  Does parent/significant other report patient has current or past symptoms of dangerousness to self? N\A  Does presenting problem suggest symptoms of dangerousness to self? No    SAFETY ASSESSMENT - OTHERS  Does patient acknowledge current or past symptoms of aggressive behavior or risk to others? no  Does parent/significant other report patient has current or past symptoms of aggressive behavior or risk to others?  N\A  Does presenting problem suggest symptoms of dangerousness to others? No    Crisis Safety Plan completed and copy given to patient? no    ABUSE/NEGLECT SCREENING  Does patient report feeling “unsafe” in his/her home, or afraid of anyone?  no  Does patient report any history of physical, sexual, or emotional abuse?  no  Does parent or significant other report any of the above? N\A  Is there evidence of neglect by self?  no  Is there evidence of neglect by a caregiver? no  Does the patient/parent report any history of CPS/APS/police involvement related  "to suspected abuse/neglect or domestic violence? no  Based on the information provided during the current assessment, is a mandated report of suspected abuse/neglect being made?  No    SUBSTANCE USE SCREENING  Yes:  Ashish all substances used in the past 30 days:      Last Use Amount   [x]   Alcohol 4/21/19 3 beers   []   Marijuana     []   Heroin     []   Prescription Opioids  (used without prescription, for    recreation, or in excess of prescribed amount)     []   Other Prescription  (used without prescription, for    recreation, or in excess of prescribed amount)     []   Cocaine      []   Methamphetamine     []   \"\" drugs (ectasy, MDMA)     []   Other substances        UDS results: not collected  Breathalyzer results: negative    What consequences does the patient associate with any of the above substance use and or addictive behaviors? None    Risk factors for detox (check all that apply):  []  Seizures   []  Diaphoretic (sweating)   []  Tremors   []  Hallucinations   []  Increased blood pressure   []  Decreased blood pressure   []  Other   [x]  None      [] Patient education on risk factors for detoxification and instructed to return to ER as needed.      MENTAL STATUS   Participation: Active verbal participation, Attentive, Engaged and Open to feedback  Grooming: Casual and Neat  Orientation: Alert and Fully Oriented  Behavior: Calm  Eye contact: Good  Mood: Euthymic  Affect: Flexible, Full range and Congruent with content  Thought process: Logical and Goal-directed  Thought content: Within normal limits  Speech: Rate within normal limits and Volume within normal limits  Perception: Within normal limits  Memory:  No gross evidence of memory deficits  Insight: Good  Judgment:  Good  Other:    Collateral information:   Source:  [] Significant other present in person:   [] Significant other by telephone  [] [facility]   [x] [facility] Nursing Staff  [x] [facility]  [] Other:     [] Unable " to complete full assessment due to:  [] Acute intoxication  [] Patient declined to participate/engage  [] Patient verbally unresponsive  [] Significant cognitive deficits  [] Significant perceptual distortions or behavioral disorganization  [] Other:      CLINICAL IMPRESSIONS:  Primary:  Anxiety per history  Secondary:  Depression per history       IDENTIFIED NEEDS/PLAN:  [Trigger DISPOSITION list for any items marked]    []  Imminent safety risk - self [] Imminent safety risk - others   []  Acute substance withdrawal []  Psychosis/Impaired reality testing   []  Mood/anxiety []  Substance use/Addictive behavior   []  Maladaptive behaviro []  Parent/child conflict   []  Family/Couples conflict []  Biomedical   [x]  Housing [x]  Financial   []   Legal  Occupational/Educational   []  Domestic violence []  Other:     Disposition: Refer to [facility], [facility] and [facility], [place] police department for bus ticket; [employer] insurance plan; writer RN reviewed community  and homeless resources with pt who verbalized understanding; written information given to pt; pt to DC to self; bus ticket given to pt; 3 day RX's for Vistaril 25 mg PO and Risperdal 1 mg PO given at DC    Does patient express agreement with the above plan? yes    Referral appointment(s) scheduled? no    Alert team only:   I have discussed findings and recommendations with Dr. Garnett who is in agreement with these recommendations. Pt is not on a legal hold    Referral information sent to the following community providers :NA    If applicable : Referred  to :for legal hold follow up at (time): KATTY Carey R.N.  4/24/2019

## 2020-03-27 NOTE — PROGRESS NOTES
Education: pt would benefit from continued education on sternal precautions  REQUIRES OT FOLLOW UP: Yes  Activity Tolerance  Activity Tolerance: Patient Tolerated treatment well  Safety Devices  Safety Devices in place: Yes  Type of devices: All fall risk precautions in place; Left in chair;Nurse notified;Call light within reach; Patient at risk for falls         Patient Diagnosis(es): There were no encounter diagnoses. has a past medical history of CAD (coronary artery disease), CHF (congestive heart failure) ([facility] [address] Diabetes mellitus ([facility] [address] Hyperlipidemia, and Hypertension. has a past surgical history that includes Cardiac surgery; Colonoscopy; and Coronary artery bypass graft (N/A, 3/24/2020). Restrictions  Restrictions/Precautions  Restrictions/Precautions: Fall Risk(Medium fall risk)  Required Braces or Orthoses?: No  Position Activity Restriction  Sternal Precautions: No Pushing, No Pulling, 5# Lifting Restrictions  Other position/activity restrictions: Bob Meek is 60 y.o. male who presented with complaint of chest pain. Symptom onset was acute for a time period of 1 week. The severity is described as moderate. The course of his symptoms over time is worsening. The symptoms improved with rest and worsened with exertion. The patient's symptom is associated with SOB. CABG 3/24  Subjective   General  Chart Reviewed: Yes  Response to previous treatment: Patient with no complaints from previous session  Family / Caregiver Present: No  Diagnosis: SOB, s/p CABG  Subjective  Subjective: Pt seated in recliner upon arrival, agreeable to OT tx.   General Comment  Comments:    Vital Signs  Patient Currently in Pain: Denies   Orientation  Orientation  Overall Orientation Status: Within Functional Limits  Objective    ADL  Grooming: Stand by assistance(in stance at sink to wash hands; seated on shower chair in walk-in shower to wash face/hair with supervision)  UE Bathing: Supervision;Setup(seated on shower and with heart pillow, min cues to maintain sternal precautions  Transfers  Sit to stand: Supervision  Stand to sit: Supervision                       Cognition  Overall Cognitive Status: WFL  Arousal/Alertness: Appropriate responses to stimuli  Following Commands: Follows multistep commands with increased time  Attention Span: Attends with cues to redirect  Memory: Decreased recall of precautions  Safety Judgement: Decreased awareness of need for assistance  Problem Solving: Assistance required to generate solutions;Assistance required to identify errors made;Assistance required to correct errors made  Insights: Fully aware of deficits  Cognition Comment: When pt asked to name sternal precautions in beginning of session, pt stated \"I'm not able to use my arms. \" When pt was asked specific sternal precautions, pt stated \"They haven't told me yet. \" Pt educated on specific sternal precautions                                         Plan   Plan  Times per week: 3-5x/wk   Times per day: Daily  Current Treatment Recommendations: Strengthening, Endurance Training, Patient/Caregiver Education & Training, Self-Care / ADL, Functional Mobility Training, Safety Education & Training, Balance Training           AM-PAC Score        AM-[facility] Inpatient Daily Activity Raw Score: 18 (03/27/20 1050)  AM-PAC Inpatient ADL T-Scale Score : 38.66 (03/27/20 1050)  ADL Inpatient CMS 0-100% Score: 46.65 (03/27/20 1050)  ADL Inpatient CMS G-Code Modifier : CK (03/27/20 1050)    Goals  Short term goals  Time Frame for Short term goals: by discharge   Short term goal 1: Complete UB ADLs with set-up assistance - supervision 3/27  Short term goal 2: Complete LB ADLs with min assistance - min A to tie pant string, goal met 3/27  Short term goal 3: Complete functional transfers with SBA - goal met 3/27  Short term goal 4: Complete functional mobility with LRAD at SBA - goal met, supervision/SBA 3/27  Short term goal 5:    Long term goals  Long term goal 1: New goal: Complete functional transfers with mod I  Long term goal 2: New goal: Complete functional mobility with mod I       Therapy Time   Individual Concurrent Group Co-treatment   Time In 0819         Time Out 0912         Minutes 53         Timed Code Treatment Minutes: 53 Minutes     Total Treatment Minutes:  [address] ([place], [place] [id number]

## 2020-03-27 NOTE — PROGRESS NOTES
CC: s/p CABG x 3 and ligation JORGE ALBERTO    No c/o  NSR now on amiodarone gtt  CTAB RRR sternum stable  As per CC progressing, post-op a fib  Continue diuresis and amiodarone protocol  Wean O2 as aman

## 2020-03-27 NOTE — PROGRESS NOTES
D:  Report received from previous shift RN, Sunil Malik. See nursing flow sheet for assessment and document flow sheet for vital signs/hemodynamic status. A:  Instructed patient on plan of care and goals for the day. R: Patient verbalized understanding. To x-ray for modified barium swallow.

## 2020-03-27 NOTE — CARE COORDINATION
CM met w/ C. Roxie Champion DNP, CVTS, re: patient dispo. .  CM informed DNP patient's AM-PAC too good for [facility]. Additionally, PT/OT recommendations are for Onofre Portillo. CM informed patient and spouse. The following was faxed to [employer] at [phone]:  Facesheet, H&P, Consult Notes, PT/OT/SLP Notes, MAR, HHC orders. Additionally, [facility] Aide and Onofre Portillo  per ALEXI Champion DNP. Per CVTS, patient to be possibly d/c on weekend  (3/28-3/29) and will need AVS/SANDRA will still need to be faxed. Case management will continue to follow progress and update discharge plan as needed.     Macie Bradley, BSN, .[phone]

## 2020-03-27 NOTE — PROGRESS NOTES
Physical Therapy  Facility/Department: [facility] CVU  Daily Treatment Note  NAME: Parvin Graham  : 1955  MRN: [id number]    Date of Service: 3/27/2020    Discharge Recommendations:Mart Donald scored a 19/24 on the AM-PAC short mobility form. Current research shows that an AM-PAC score of 18 or greater is typically associated with a discharge to the patient's home setting. Based on the patients AM-PAC score and their current functional mobility deficits, it is recommended that the patient have 2-3 sessions per week of Physical Therapy at d/c to increase the patients independence. If patient discharges prior to next session this note will serve as a discharge summary. Please see below for the latest assessment towards goals. HOME HEALTH CARE: LEVEL 1 STANDARD    - Initial home health evaluation to occur within 24-48 hours, in patient home   - Therapy to evaluate with goal of regaining prior level of functioning   - Therapy to evaluate if patient has [address] needs for personal care      PT Equipment Recommendations  Equipment Needed: Yes  Walker: Rollator (4 Wheeled)    Assessment   Body structures, Functions, Activity limitations: Decreased functional mobility ; Decreased ADL status; Decreased endurance;Decreased strength;Decreased balance; Increased pain  Assessment: Pt able to perform functional mobility with decreased assist this date. Pt also able to increase ambulation distance with and without AD this session. Pt would benefit from continued skilled PT services to address deficits. Treatment Diagnosis: impaired gait, transfers, and balance  Prognosis: Good  Decision Making: Low Complexity  Clinical Presentation: stable  PT Education: Gait Training;Goals;PT Role;Plan of Care;General Safety;Transfer Training;Precautions; Functional Mobility Training  Patient Education: d/c recommendations, sternal precautions--pt verbalizing understanding  Barriers to Learning: none  REQUIRES PT FOLLOW UP: Yes  Activity Tolerance  Activity Tolerance: Patient Tolerated treatment well     Patient Diagnosis(es): The encounter diagnosis was S/P coronary artery bypass graft x 3.     has a past medical history of CAD (coronary artery disease), CHF (congestive heart failure) ([facility] [address] Diabetes mellitus ([facility] [address] Hyperlipidemia, and Hypertension. has a past surgical history that includes Cardiac surgery; Colonoscopy; and Coronary artery bypass graft (N/A, 3/24/2020). Restrictions  Restrictions/Precautions  Restrictions/Precautions: Fall Risk(Medium fall risk)  Required Braces or Orthoses?: No  Position Activity Restriction  Sternal Precautions: No Pushing, No Pulling, 5# Lifting Restrictions  Other position/activity restrictions: Royal Dsouza is 60 y.o. male who presented with complaint of chest pain. Symptom onset was acute for a time period of 1 week. The severity is described as moderate. The course of his symptoms over time is worsening. The symptoms improved with rest and worsened with exertion. The patient's symptom is associated with SOB. CABG 3/24  Subjective   General  Chart Reviewed: Yes  Response To Previous Treatment: Patient with no complaints from previous session. Family / Caregiver Present: No  Subjective  Subjective: Pt reporting no pain at this time. General Comment  Comments: Pt supine in bed upon arrival, agreeable to ambulation with PT.   Pain Screening  Patient Currently in Pain: Denies  Vital Signs  Patient Currently in Pain: Denies       Orientation  Orientation  Overall Orientation Status: Within Functional Limits  Cognition      Objective   Bed mobility  Supine to Sit: Stand by assistance  Sit to Supine: Stand by assistance  Scooting: Stand by assistance  Comment: min cues for sternal precautions  Transfers  Sit to Stand: Supervision  Stand to sit: Supervision  Ambulation  Ambulation?: Yes  Ambulation 1  Surface: level tile  Device: Rollator  Assistance: Supervision  Quality of Gait: slight sway noted, good step length, slightly decreased vanessa  Distance: ~300 ft  Stairs/Curb  Stairs?: No     Balance  Posture: Good  Sitting - Static: Good  Sitting - Dynamic: Good  Standing - Static: Good;-  Standing - Dynamic: Good;-                           G-Code     OutComes Score                                                     AM-PAC Score  AM-PAC Inpatient Mobility Raw Score : 19 (03/27/20 1532)  AM-PAC Inpatient T-Scale Score : 45.44 (03/27/20 1532)  Mobility Inpatient CMS 0-100% Score: 41.77 (03/27/20 1532)  Mobility Inpatient CMS G-Code Modifier : CK (03/27/20 1532)          Goals  Short term goals  Time Frame for Short term goals: To be met prior to discharge  Short term goal 1: Pt will complete bed mobility with mod I.  (Progressing)  Short term goal 2: Pt will complete sit to/from stand with supervision. --MET 3/23/2020  Short term goal 3: Pt will ambulate 100 ft with LRAD and supervision. --DAVID 3/23/2020  Short term goal 4: Pt will perform transfers with LRAD MOD I   Short term goal 5: Pt will ambulate 150' with LRAD MOD I     Plan    Plan  Times per week: 3-5x  Times per day: Daily  Current Treatment Recommendations: Strengthening, ROM, Balance Training, Functional Mobility Training, Transfer Training, Endurance Training, Gait Training, Safety Education & Training, Patient/Caregiver Education & Training, Stair training, Neuromuscular Re-education, Equipment Evaluation, Education, & procurement  Safety Devices  Type of devices:  All fall risk precautions in place, Call light within reach, Gait belt, Patient at risk for falls, Nurse notified, Chair alarm in place, Left in chair  Restraints  Initially in place: No     Therapy Time   Individual Concurrent Group Co-treatment   Time In 1430         Time Out 1440         Minutes 10         Timed Code Treatment Minutes: [facility]   Tito Lackey, PT, DPT, [id number]

## 2020-03-27 NOTE — CONSULTS
Hospital Medicine  Consult History & Physical        Reason for constultation: Diabetes    Date of Service: Pt seen/examined in consultation on 3/27/2020    History Of Present Illness:      60 y.o. male history of diabetes, hypertension who we are asked to see/evaluate by No att. providers found for medical management of Diabetes. He was transferred from outside hospital after presenting with shortness of breath and chest pain. He was noted to have non-STEMI. He had left heart catheterization which showed three-vessel disease. He was noted to have EF 35%. He had CABG x3/left atrial appendage on 3/24. Postop he has had hyperglycemia. He was resumed on his outpatient Lantus 25 units nightly. He however has been started on insulin coverage with meals. He takes glipizide at home. He has been a diabetic for over 10 years. He does not recall when starting insulin. He is followed at the [place] by his PCP. He denies neuropathy or retinopathy. A1c was 8.9 on 3/13/20.       Past Medical History:        Diagnosis Date    CAD (coronary artery disease)     [facility])     Diabetes mellitus ([facility] [address]     Hyperlipidemia     Hypertension        Past Surgical History:        Procedure Laterality Date    CARDIAC SURGERY      angiogram 3/2020    COLONOSCOPY      CORONARY ARTERY BYPASS GRAFT N/A 3/24/2020    TCPB,  Urgent CABG X3, EVH left saphenous vein, LIMA grafting X1, SVG x2,  Ligation LEFT ATRIAL APPENDAGE - 45 atriclip, Doppler vessel flow verification, bilateral intercostal nerve block. performed by Yisel Groves MD at [place] [address]       Medications Prior to Admission:    Prior to Admission medications    Medication Sig Start Date End Date Taking?  Authorizing Provider   aspirin 325 MG EC tablet Take 1 tablet by mouth daily 3/28/20  Yes VINI Tan - CNP   sennosides-docusate sodium (SENOKOT-S) 8.6-50 MG tablet Take 1 tablet by mouth daily 3/27/20 4/26/20 Yes Subhash Hodge heart failure . Resolved     4. New onset systolic HF EF 21%  -Continue Lasix 20 mg IV twice daily  -Continue afterload reduction with losartan  -As per cardiology     5. Hypertension  -BP controlled    6. Postop atrial fibrillation-patient is on amiodarone. 7.  Anemia of blood loss-monitor H&H    8.  Moderate Oropharyngeal Oropharyngeal Dysphagia:    9. Resolved leukocytosis    DVT Prophylaxis:   Diet: Dietary Nutrition Supplements: Other Oral Supplement (see comment)  DIET CARB CONTROL; Low Sodium (2 GM); Dysphagia Soft and Bite-Sized; Mildly Thick (Nectar);  No Drinking Straw, Cardiac  Code Status: Full Code    PT/OT Eval Status: Active and ongoing    Thank you for the consultation, will follow up as needed    Electronically signed by Berta Mai MD on 3/27/20 at 2:41 PM EDT

## 2020-03-27 NOTE — PROGRESS NOTES
[address]  Preliminary information faxed to [facility]. Aware of potential weekend discharge. Discharge planner notified.

## 2020-03-27 NOTE — PROCEDURES
[facility] SPEECH THERAPY  MODIFIED BARIUM SWALLOW EVALUATION    Patient's Name: Tenisha Aggarwal. O.B: 1955  Medical Diagnosis: [facility] [I50.9]  Treatment Diagnosis: Dysphagia  Date of Evaluation: 3/27/2020  Type of Study: Modified Barium Swallowing Study (MBS)  Diet Prior to Study:  Dysphagia III Soft and Bite-Sized with thin liquids, no straws, meds in puree  Pain Level: Pt did not report pain    Impression:  Modified Barium Swallow evaluation completed on 3/27/2020. Results indicate mild-moderate oropharyngeal dysphagia characterized by one instance of SILENT aspiration with thin liquids. Thin liquids via cup and straw initially revealed shallow laryngeal penetration during the swallow, one instance of SILENT aspiration was viewed during the swallow after regular solids were introduced. It was difficult to determine if aspiration was due to mild pyriform residue from regular texture solid trial or from thin liquid that was provided following regular solid trial.  A stain remained at level of vocal cords. No aspiration or laryngeal penetration was viewed with nectar or honey thick liquids. Mildly reduced pharyngeal clearing was noted with puree, soft solid, and regular solid textures. Overall, main factors contributing to dysphagia include reduced hyolaryngeal excursion, decreased pharyngeal clearing, reduced sensation, and absent cough reflex to clear aspirated material.  Pt is at risk for episodic aspiration, therefore recommend strict use of aspiration precautions. Aspiration/Penetration Risk:  Increased risk with thin liquids    Recommendations:    Diet Level:   1.) Dysphagia III Soft and Bite-Sized with Mildly Thick (Nectar) Liquids, no straws, meds in puree  2.) Allow sips of thin water between meals     Strategies: Upright 90 degrees at meals; No Straws;  Meds with puree; Small bites/sips  Treatments: Speech Therapy for dysphagia treatment  Goals:  1)Pt will tolerate diet with no signs or symptoms of aspiration   2) Pt will participate in pharyngeal strengthening exercises to improve swallow function    Consistencies given: Thin, Mildly Thick (Nectar) Liquids, Moderately Thick (honey) Liquids, Puree, Soft solid, Solid    Oral Phase  -Minimal premature bolus loss to pharynx  -Mildly prolonged mastication    Pharyngeal Phase  -Decreased hyolaryngeal excursion  -Reduced pharyngeal clearing  -Consistent shallow laryngeal penetration with thin liquids, one episode of SILENT aspiration  -Questionable episode of SILENT aspiration of pharyngeal residue from regular solid  -Decreased sensation, absent cough reflex to clear aspirated material    Esophageal Phase  Unremarkable    Following Evaluation:  Results/recommendations and education given to Patient and nurse, who verbalized understanding    Timed Code Treatment: 0 minutes    Total Treatment Time: 30 minutes    Jemima Stone, [address] [place] [address]  Speech-Language Pathologist

## 2020-03-27 NOTE — PLAN OF CARE
Problem: Cardiovascular  Goal: Hemodynamic stability  3/26/2020 2013 by Sarah Morrison RN  Note: Pulse rate and rhythm, peripheral pulses, and capillary refill assessed every shift with assessment. General color and body temperature monitored throughout shift and with vitals. Assess for edema with head to toe assessment. Administer treatments and medications as ordered. Monitor patient's weight.   3/26/2020 2012 by Sarah Morrison RN  Outcome: Ongoing  Note: Pulse rate and rhythm, peripheral pulses, and capillary refill assessed every shift with assessment. General color and body temperature monitored throughout shift and with vitals. Assess for edema with head to toe assessment. Administer treatments and medications as ordered. Monitor patient's weight.

## 2020-03-28 LAB
GLUCOSE BLD-MCNC: 114 MG/DL (ref 70–99)
GLUCOSE BLD-MCNC: 119 MG/DL (ref 70–99)
GLUCOSE BLD-MCNC: 139 MG/DL (ref 70–99)
GLUCOSE BLD-MCNC: 155 MG/DL (ref 70–99)
PERFORMED ON: ABNORMAL
TSH SERPL DL<=0.05 MIU/L-ACNC: 4.47 UIU/ML (ref 0.27–4.2)

## 2020-03-28 PROCEDURE — 6370000000 HC RX 637 (ALT 250 FOR IP): Performed by: THORACIC SURGERY (CARDIOTHORACIC VASCULAR SURGERY)

## 2020-03-28 PROCEDURE — 2000000000 HC ICU R&B

## 2020-03-28 PROCEDURE — 6370000000 HC RX 637 (ALT 250 FOR IP): Performed by: NURSE PRACTITIONER

## 2020-03-28 PROCEDURE — 94761 N-INVAS EAR/PLS OXIMETRY MLT: CPT

## 2020-03-28 PROCEDURE — 94669 MECHANICAL CHEST WALL OSCILL: CPT

## 2020-03-28 PROCEDURE — 99024 POSTOP FOLLOW-UP VISIT: CPT | Performed by: THORACIC SURGERY (CARDIOTHORACIC VASCULAR SURGERY)

## 2020-03-28 PROCEDURE — 99232 SBSQ HOSP IP/OBS MODERATE 35: CPT | Performed by: NURSE PRACTITIONER

## 2020-03-28 PROCEDURE — 2580000003 HC RX 258: Performed by: THORACIC SURGERY (CARDIOTHORACIC VASCULAR SURGERY)

## 2020-03-28 PROCEDURE — 6360000002 HC RX W HCPCS: Performed by: THORACIC SURGERY (CARDIOTHORACIC VASCULAR SURGERY)

## 2020-03-28 RX ADMIN — ASPIRIN 325 MG: 325 TABLET, COATED ORAL at 07:35

## 2020-03-28 RX ADMIN — ACETAMINOPHEN 1000 MG: 500 TABLET, FILM COATED ORAL at 16:00

## 2020-03-28 RX ADMIN — AMIODARONE HYDROCHLORIDE 400 MG: 200 TABLET ORAL at 08:00

## 2020-03-28 RX ADMIN — POTASSIUM CHLORIDE 10 MEQ: 750 TABLET, FILM COATED, EXTENDED RELEASE ORAL at 11:26

## 2020-03-28 RX ADMIN — INSULIN LISPRO 2 UNITS: 100 INJECTION, SOLUTION INTRAVENOUS; SUBCUTANEOUS at 16:15

## 2020-03-28 RX ADMIN — FONDAPARINUX SODIUM 2.5 MG: 2.5 INJECTION, SOLUTION SUBCUTANEOUS at 08:30

## 2020-03-28 RX ADMIN — METOPROLOL TARTRATE 25 MG: 25 TABLET, FILM COATED ORAL at 20:58

## 2020-03-28 RX ADMIN — INSULIN LISPRO 6 UNITS: 100 INJECTION, SOLUTION INTRAVENOUS; SUBCUTANEOUS at 16:15

## 2020-03-28 RX ADMIN — ACETAMINOPHEN 1000 MG: 500 TABLET, FILM COATED ORAL at 08:00

## 2020-03-28 RX ADMIN — FUROSEMIDE 20 MG: 10 INJECTION, SOLUTION INTRAMUSCULAR; INTRAVENOUS at 18:37

## 2020-03-28 RX ADMIN — ACETAMINOPHEN 1000 MG: 500 TABLET, FILM COATED ORAL at 20:57

## 2020-03-28 RX ADMIN — POTASSIUM CHLORIDE 10 MEQ: 750 TABLET, FILM COATED, EXTENDED RELEASE ORAL at 07:36

## 2020-03-28 RX ADMIN — INSULIN LISPRO 6 UNITS: 100 INJECTION, SOLUTION INTRAVENOUS; SUBCUTANEOUS at 11:26

## 2020-03-28 RX ADMIN — Medication 400 MG: at 20:58

## 2020-03-28 RX ADMIN — INSULIN GLARGINE 25 UNITS: 100 INJECTION, SOLUTION SUBCUTANEOUS at 20:59

## 2020-03-28 RX ADMIN — INSULIN LISPRO 6 UNITS: 100 INJECTION, SOLUTION INTRAVENOUS; SUBCUTANEOUS at 07:34

## 2020-03-28 RX ADMIN — POTASSIUM CHLORIDE 10 MEQ: 750 TABLET, FILM COATED, EXTENDED RELEASE ORAL at 16:14

## 2020-03-28 RX ADMIN — Medication 10 ML: at 08:00

## 2020-03-28 RX ADMIN — MUPIROCIN: 20 OINTMENT TOPICAL at 09:00

## 2020-03-28 RX ADMIN — SENNOSIDES AND DOCUSATE SODIUM 1 TABLET: 8.6; 5 TABLET ORAL at 08:00

## 2020-03-28 RX ADMIN — Medication 10 ML: at 20:58

## 2020-03-28 RX ADMIN — Medication 400 MG: at 08:00

## 2020-03-28 RX ADMIN — PANTOPRAZOLE SODIUM 40 MG: 40 TABLET, DELAYED RELEASE ORAL at 07:35

## 2020-03-28 RX ADMIN — LOSARTAN POTASSIUM 25 MG: 25 TABLET ORAL at 11:27

## 2020-03-28 RX ADMIN — METOPROLOL TARTRATE 25 MG: 25 TABLET, FILM COATED ORAL at 08:00

## 2020-03-28 RX ADMIN — MUPIROCIN: 20 OINTMENT TOPICAL at 20:59

## 2020-03-28 RX ADMIN — FUROSEMIDE 20 MG: 10 INJECTION, SOLUTION INTRAMUSCULAR; INTRAVENOUS at 08:00

## 2020-03-28 RX ADMIN — AMIODARONE HYDROCHLORIDE 400 MG: 200 TABLET ORAL at 20:58

## 2020-03-28 ASSESSMENT — PAIN SCALES - GENERAL
PAINLEVEL_OUTOF10: 0

## 2020-03-28 ASSESSMENT — PULMONARY FUNCTION TESTS: PEFR_L/MIN: 8

## 2020-03-28 NOTE — PLAN OF CARE
Problem: Respiratory  Goal: No pulmonary complications  Note: Pulse rate and rhythm, peripheral pulses, and capillary refill assessed every shift with assessment. General color and body temperature monitored throughout shift and with vitals. Assess for edema with head to toe assessment. Administer treatments and medications as ordered. Monitor patient's weight. Will not keep oxygen on . Sats drop to 88-89% on room air while sleeping.  Maintains saturation at 94 % on 2L/NC Needs encouragement with incentive spirometry and accapella

## 2020-03-28 NOTE — PROGRESS NOTES
[address]   Electrophysiology Progress Note     Date: 3/28/2020  Admit Date: 3/13/2020     Reason for follow up: NSTEMI    Chief Complaint: CP and SOB    History of Present Illness: History obtained from patient and medical record. Mera Charles is a 60 y.o. male with a past medical history of HTN, HLD, DM, CHF, and CAD. Presented with Cp and SOB and underwent LHC showing severe CAD. S/p CABG x 3 w/ JORGE ALBERTO clip. Developed afib post-op and on amiodarone. Interval Hx: Today, he is doing well post-operatively. No afib last 24 hours. Plans to go home tomorrow. Denies peripheral swelling. No new complaints today. No major events overnight. Denies having chest pain, palpitations, shortness of breath, orthopnea/PND, cough, or dizziness. Patient seen and examined. Clinical notes reviewed. Telemetry reviewed. Allergies: Allergies   Allergen Reactions    Lipitor [Atorvastatin]        Home Meds:  Prior to Visit Medications    Medication Sig Taking? Authorizing Provider   aspirin 325 MG EC tablet Take 1 tablet by mouth daily Yes VINI Lopez CNP   sennosides-docusate sodium (SENOKOT-S) 8.6-50 MG tablet Take 1 tablet by mouth daily Yes VINI Lopez CNP   traMADol (ULTRAM) 50 MG tablet Take 1 tablet by mouth every 4 hours as needed for Pain (incisional pain) for up to 7 days. Intended supply: 3 days. Take lowest dose possible to manage pain Yes VINI Lopez CNP   amiodarone (PACERONE) 400 MG tablet Take 1 tablet by mouth 2 times daily for 7 days, THEN 0.5 tablets 2 times daily for 7 days, THEN 0.5 tablets daily for 14 days.  Yes VINI Lopez CNP   magnesium oxide (MAG-OX) 400 (240 Mg) MG tablet Take 1 tablet by mouth 2 times daily for 7 days Yes VINI Lopez CNP   potassium chloride (KLOR-CON M) 20 MEQ extended release tablet Take 1 tablet by mouth 2 times daily for 7 days Yes VINI Lopez CNP   furosemide (LASIX) 40 MG tablet Take 1 tablet by mouth daily Yes VINI [place] - MADDI   glipiZIDE (GLUCOTROL) 10 MG tablet Take 10 mg by mouth 2 times daily (before meals) Yes Historical Provider, MD   loperamide (IMODIUM) 2 MG capsule Take 2 mg by mouth daily Yes Historical Provider, MD   mesalamine (APRISO) 0.375 g extended release capsule Take 1.5 g by mouth daily Yes Historical Provider, MD   losartan (COZAAR) 25 MG tablet Take 25 mg by mouth daily Yes Historical Provider, MD   metoprolol tartrate (LOPRESSOR) 25 MG tablet Take 25 mg by mouth 2 times daily Yes Historical Provider, MD   tiZANidine (ZANAFLEX) 4 MG tablet Take 4 mg by mouth every 8 hours as needed Yes Historical Provider, MD   meclizine (ANTIVERT) 25 MG tablet Take 25 mg by mouth 3 times daily as needed  Historical Provider, MD      Scheduled Meds:   amiodarone  400 mg Oral BID    Followed by   Maria Guadalupe Johnson ON 4/1/2020] amiodarone  200 mg Oral Daily    metoprolol tartrate  25 mg Oral BID    insulin glargine  25 Units Subcutaneous Nightly    sodium chloride flush  10 mL Intravenous 2 times per day    acetaminophen  1,000 mg Oral Q6H    pantoprazole  40 mg Oral Daily    furosemide  20 mg Intravenous BID    magnesium oxide  400 mg Oral BID    mupirocin   Nasal BID    potassium chloride  10 mEq Oral TID WC    aspirin  325 mg Oral Daily    insulin lispro  0-12 Units Subcutaneous TID WC    insulin lispro  0-6 Units Subcutaneous Nightly    sennosides-docusate sodium  1 tablet Oral BID    losartan  25 mg Oral Lunch    fondaparinux  2.5 mg Subcutaneous Daily    insulin lispro  0.08 Units/kg Subcutaneous TID WC     Continuous Infusions:   dextrose       PRN Meds:sodium chloride flush, potassium chloride, oxyCODONE **OR** oxyCODONE, diphenhydrAMINE, zolpidem, ondansetron, metoclopramide, albuterol sulfate HFA, albumin human, furosemide, glucose, dextrose, glucagon (rDNA), dextrose, magnesium hydroxide, polyethylene glycol     Past Medical History:  Past Medical History: Diagnosis Date    CAD (coronary artery disease)     CHF (congestive heart failure) (HCC)     Diabetes mellitus (HCC)     Hyperlipidemia     Hypertension         Past Surgical History:    has a past surgical history that includes Cardiac surgery; Colonoscopy; and Coronary artery bypass graft (N/A, 3/24/2020). Social History:  Reviewed. reports that he quit smoking about 25 years ago. He has never used smokeless tobacco. He reports current alcohol use of about 4.0 - 12.0 standard drinks of alcohol per week. He reports that he does not use drugs. Family History:  Reviewed. family history includes Cancer in his mother. Denies family history of sudden cardiac death, arrhythmia, premature CAD    Review of Systems:  · Constitutional: Negative for fever, night sweats, chills, weight changes, or weakness  · Skin: Negative for rash, dry skin, pruritus, bruising, bleeding, blood clots, or changes in skin pigment HEENT: Negative for vision changes, ringing in the ears, sore throat, dysphagia, or swollen lymph nodes  · Respiratory: Reviewed in HPI  · Cardiovascular: Reviewed in HPI  · Gastrointestinal: Negative for abdominal pain, N/V/D, constipation, or black/tarry stools  · Genito-Urinary: Negative for dysuria, incontinence, urgency, or hematuria  · Musculoskeletal: Negative for joint swelling, muscle pain, or injuries  · Neurological/Psych: Negative for confusion, seizures, headaches, balance issues or TIA-like symptoms. No anxiety, depression, or insomnia    Physical Examination:  Vitals:    03/28/20 1125   BP: 125/71   Pulse: 68   Resp: 16   Temp: 97.6 °F (36.4 °C)   SpO2: 96%      In: 266 [P.O.:120;  I.V.:146]  Out: -    Wt Readings from Last 3 Encounters:   03/28/20 171 lb 1.2 oz (77.6 kg)       Intake/Output Summary (Last 24 hours) at 3/28/2020 1217  Last data filed at 3/28/2020 0745  Gross per 24 hour   Intake 352 ml   Output --   Net 352 ml     Telemetry: Personally Reviewed Normal sinus rhythm  · Constitutional: Cooperative and in no apparent distress, and appears well nourished +weight above baseline  · Skin: Warm and pink; no pallor, cyanosis, bruising, or clubbing+ sternal incision  ·   · HEENT: Symmetric and normocephalic. PERRL, EOM intact. Conjunctiva pink with clear sclera. Mucus membranes pink and moist. Teeth intact. Thyroid smooth without nodules or goiter. · Cardiovascular: regular and rhythm. S1 & S2, no murmurs, rubs, or gallops. Peripheral pulses 2+, capillary refill < 3 seconds. Negative elevation of JVP. No peripheral edema  · Respiratory: Respirations symmetric and unlabored. Lungs clear to auscultation bilaterally, no wheezing, crackles, or rhonchi +diminished  · Gastrointestinal: Abdomen soft and round. Bowel sounds normoactive in all quadrants without tenderness or masses. · Musculoskeletal: Bilateral upper and lower extremity strength 5/5 with full ROM  · Neurologic/Psych: Awake and orientated to person, place and time. Calm affect, appropriate mood    Pertinent labs, diagnostic, device, and imaging results reviewed as a part of this visit    Labs:    BMP:   Recent Labs     03/26/20  0311 03/27/20  0310   NA  --  144   K  --  4.1   CL  --  104   CO2  --  27   BUN  --  24*   CREATININE  --  0.7*   MG 2.30 2.30     Estimated Creatinine Clearance: 101 mL/min (A) (based on SCr of 0.7 mg/dL (L)).    CBC:   Recent Labs     03/27/20 0310   WBC 12.7*   HGB 9.5*   HCT 28.4*   MCV 90.0        Thyroid:   Lab Results   Component Value Date    TSH 4.47 03/27/2020     Lipids:   Lab Results   Component Value Date    TRIG 199 03/17/2020     LFTS:   Lab Results   Component Value Date    ALT 53 03/17/2020    AST 47 03/17/2020    ALKPHOS 76 03/17/2020    PROT 7.0 03/17/2020    BILITOT <0.2 03/17/2020     Cardiac Enzymes:   Lab Results   Component Value Date    TROPONINI 0.26 03/13/2020     Coags:   Lab Results   Component Value Date    PROTIME 13.5 03/23/2020    INR 1.16 03/23/2020 ECG: 3/13//20: SR 77    ECHO:  3/14/20  Summary   *Definity contrast administered. *Left ventricle - normal in size and thickness, function is reduced with EF   of 40%   *Wall motion - basal to mid septal hypokinesis.    *Aortic valve - sclerotic   *Mitral valve - mild regurgitation    Cath: 3/20/20  LM                        73% distal by IVUS  LAD                      90% prox, 90% mid 95% mid  CX                        OM1 - mid 50%, 50% ostial  RCA                      100% mid with staining, L-R collaterals                         LVG                      Not performed  LVEDP                 Not obtained     Intervention:         IVUS of LM and ostial Cx - LM - 73% stenosis, area 5.7     Post Cath Dx:       Severe MVD - refer for CABG  No statin due to allergy  Continue asa    Problem List:   Patient Active Problem List    Diagnosis Date Noted    S/P coronary artery bypass graft x 3 03/24/2020    Ischemic cardiomyopathy     Hypokalemia     Acute delirium     Metabolic alkalosis     Hypernatremia     Acute encephalopathy     DM (diabetes mellitus), type 2, uncontrolled with complications ([address]     Congestive heart failure of unknown etiology ([address] 03/13/2020    3-vessel coronary artery disease 03/13/2020    NSTEMI (non-ST elevated myocardial infarction) ([address] 03/13/2020    HTN (hypertension), benign 03/13/2020    Uncontrolled type 2 diabetes mellitus with hyperglycemia ([address] 31/99/1985    Acute systolic (congestive) heart failure ([address] 03/13/2020    Acute respiratory failure with hypoxia ([address] 03/13/2020    Aspiration pneumonia ([address] 03/13/2020      Assessment and Plan:  CAD   - S/p CABG x 3 nd JORGE ALBERTO clip   - Doing well post-op  pAF    - Post op afib on amiodarone   - No afib on telemetry today    - Continue amiodarone loading   - Follow up for recrrence   Chronic systolicheart failure   - Appears compensated               ~ EF 40% per echo   - Continue medical managment    - Discussed

## 2020-03-28 NOTE — PROGRESS NOTES
Dr. Clare Prieto in to see pt. Rec'd verbal orders to remove PICC line. PICC removed, pressure held x3-5min, cleansed with chloraprep and tegaderm/gauze dressing applied.     Kelly Rios  3/28/2020

## 2020-03-28 NOTE — PROGRESS NOTES
CC: s/p CABG x 3 and ligation JORGE ALBERTO, post-op a fib    No c/o  NSR  On room air  CTAB RRR sternum stable  As per CC progressing well  Monitor rhythm today on PO amiodarone  Continue IV diuresis  D/C home tomorrow if remains NSR

## 2020-03-29 VITALS
BODY MASS INDEX: 25.69 KG/M2 | HEIGHT: 68 IN | TEMPERATURE: 97.6 F | DIASTOLIC BLOOD PRESSURE: 71 MMHG | RESPIRATION RATE: 16 BRPM | SYSTOLIC BLOOD PRESSURE: 125 MMHG | WEIGHT: 169.53 LBS | OXYGEN SATURATION: 95 % | HEART RATE: 75 BPM

## 2020-03-29 LAB
GLUCOSE BLD-MCNC: 106 MG/DL (ref 70–99)
PERFORMED ON: ABNORMAL

## 2020-03-29 PROCEDURE — 99232 SBSQ HOSP IP/OBS MODERATE 35: CPT | Performed by: NURSE PRACTITIONER

## 2020-03-29 PROCEDURE — 2580000003 HC RX 258: Performed by: THORACIC SURGERY (CARDIOTHORACIC VASCULAR SURGERY)

## 2020-03-29 PROCEDURE — 6370000000 HC RX 637 (ALT 250 FOR IP): Performed by: NURSE PRACTITIONER

## 2020-03-29 PROCEDURE — 6360000002 HC RX W HCPCS: Performed by: THORACIC SURGERY (CARDIOTHORACIC VASCULAR SURGERY)

## 2020-03-29 PROCEDURE — 99024 POSTOP FOLLOW-UP VISIT: CPT | Performed by: THORACIC SURGERY (CARDIOTHORACIC VASCULAR SURGERY)

## 2020-03-29 PROCEDURE — 6370000000 HC RX 637 (ALT 250 FOR IP): Performed by: THORACIC SURGERY (CARDIOTHORACIC VASCULAR SURGERY)

## 2020-03-29 RX ORDER — GLUCOSAMINE HCL/CHONDROITIN SU 500-400 MG
CAPSULE ORAL
Qty: 100 STRIP | Refills: 0 | Status: SHIPPED | OUTPATIENT
Start: 2020-03-29 | End: 2020-03-29 | Stop reason: SDUPTHER

## 2020-03-29 RX ORDER — LANCETS 30 GAUGE
1 EACH MISCELLANEOUS 2 TIMES DAILY
Qty: 100 EACH | Refills: 5 | Status: SHIPPED | OUTPATIENT
Start: 2020-03-29

## 2020-03-29 RX ORDER — GLUCOSAMINE HCL/CHONDROITIN SU 500-400 MG
CAPSULE ORAL
Qty: 100 STRIP | Refills: 0 | Status: SHIPPED | OUTPATIENT
Start: 2020-03-29

## 2020-03-29 RX ORDER — LANCETS 30 GAUGE
1 EACH MISCELLANEOUS 2 TIMES DAILY
Qty: 100 EACH | Refills: 5 | Status: SHIPPED | OUTPATIENT
Start: 2020-03-29 | End: 2020-03-29 | Stop reason: SDUPTHER

## 2020-03-29 RX ADMIN — LOSARTAN POTASSIUM 25 MG: 25 TABLET ORAL at 11:42

## 2020-03-29 RX ADMIN — INSULIN LISPRO 6 UNITS: 100 INJECTION, SOLUTION INTRAVENOUS; SUBCUTANEOUS at 08:27

## 2020-03-29 RX ADMIN — Medication 400 MG: at 08:44

## 2020-03-29 RX ADMIN — POTASSIUM CHLORIDE 10 MEQ: 750 TABLET, FILM COATED, EXTENDED RELEASE ORAL at 08:45

## 2020-03-29 RX ADMIN — ASPIRIN 325 MG: 325 TABLET, COATED ORAL at 08:40

## 2020-03-29 RX ADMIN — METOPROLOL TARTRATE 25 MG: 25 TABLET, FILM COATED ORAL at 08:43

## 2020-03-29 RX ADMIN — PANTOPRAZOLE SODIUM 40 MG: 40 TABLET, DELAYED RELEASE ORAL at 08:44

## 2020-03-29 RX ADMIN — AMIODARONE HYDROCHLORIDE 400 MG: 200 TABLET ORAL at 08:43

## 2020-03-29 RX ADMIN — Medication 10 ML: at 08:45

## 2020-03-29 RX ADMIN — ACETAMINOPHEN 1000 MG: 500 TABLET, FILM COATED ORAL at 08:45

## 2020-03-29 RX ADMIN — FONDAPARINUX SODIUM 2.5 MG: 2.5 INJECTION, SOLUTION SUBCUTANEOUS at 08:40

## 2020-03-29 RX ADMIN — SENNOSIDES AND DOCUSATE SODIUM 1 TABLET: 8.6; 5 TABLET ORAL at 08:44

## 2020-03-29 RX ADMIN — FUROSEMIDE 20 MG: 10 INJECTION, SOLUTION INTRAMUSCULAR; INTRAVENOUS at 08:45

## 2020-03-29 ASSESSMENT — PAIN SCALES - GENERAL
PAINLEVEL_OUTOF10: 0

## 2020-03-29 NOTE — PROGRESS NOTES
Call received from [employer] with question about quantity to dispense for pt's Rx for Amiodarone. Based on sig of Rx, pt to take 1 tab PO BID x7 days (14 tabs), then decrease to ½ tab PO BID x 7 days (7 tabs), then decrease to ½ tab PO daily for 14 days (7 tabs). Total tabs for full tapering Rx = 28 tabs. Spoke to Karson Arrington RPh. Also instructed RPh to ensure pt knows NOT to discard his half-tablets as he's cutting them throughout his tapering dose. Lauren to put a hold on Rx for pt consultation to educate.     Abiodun Negron Wellington  3/29/2020

## 2020-03-29 NOTE — PROGRESS NOTES
Discharge instructions reviewed with pt. Thoroughly discussed med administration and post-op restrictions. Pt disconnected from heart monitor, IJ removed, site cleansed, pressure held x5min, D-Stat/tegaderm applied. Pt tolerated well. Awaiting family to arrive with pt's clothes and for pt pick-up to go home.     Abiodun Ridley  3/29/2020

## 2020-03-29 NOTE — PROGRESS NOTES
D/c order rec'd per Dr. Tyna Schwab, awaiting hospitalist for d/c Rx's for DM management. Hospitalist aware.      Abiodun Sanchez  3/29/2020

## 2020-03-29 NOTE — CARE COORDINATION
SW noted discharge order for patient. SW faxed discharge paperwork, SANDRA and AVS, to [facility]. All needs met per case mgmt.     Electronically signed by JERRICA Kovacs, DEAN on 3/29/2020 at 10:12 AM

## 2020-03-29 NOTE — PROGRESS NOTES
[address]   Electrophysiology Progress Note     Date: 3/29/2020  Admit Date: 3/13/2020     Reason for follow up: NSTEMI    Chief Complaint: CP and SOB    History of Present Illness: History obtained from patient and medical record. Anisa Rey is a 60 y.o. male with a past medical history of HTN, HLD, DM, CHF, and CAD. Presented with Cp and SOB and underwent LHC showing severe CAD. S/p CABG x 3 w/ JORGE ALBERTO clip. Developed afib post-op and on amiodarone. Interval Hx: Today, he is being seen for follow up. No afib on monitor, only had 4 hour episode. No new complaints today. No major events overnight. Denies having chest pain, palpitations, shortness of breath, orthopnea/PND, cough, or dizziness. Patient seen and examined. Clinical notes reviewed. Telemetry reviewed. Allergies: Allergies   Allergen Reactions    Lipitor [Atorvastatin]        Home Meds:  Prior to Visit Medications    Medication Sig Taking? Authorizing Provider   insulin glargine (LANTUS;BASAGLAR) 100 UNIT/ML injection pen Inject 30 Units into the skin nightly Yes Ellyn Wetzel MD   Lancets MISC 1 each by Does not apply route 2 times daily Yes Ellyn Wetzel MD   blood glucose monitor strips Test 3  times a day & as needed for symptoms of irregular blood glucose. Yes Ellyn Wetzel MD   Insulin Pen Needle 30G X 8 MM MISC 1 each by Does not apply route daily Yes Ellyn Wetzel MD   aspirin 325 MG EC tablet Take 1 tablet by mouth daily Yes VINI Pimentel CNP   sennosides-docusate sodium (SENOKOT-S) 8.6-50 MG tablet Take 1 tablet by mouth daily Yes VINI Pimentel CNP   traMADol (ULTRAM) 50 MG tablet Take 1 tablet by mouth every 4 hours as needed for Pain (incisional pain) for up to 7 days. Intended supply: 3 days.  Take lowest dose possible to manage pain Yes VINI Pimentel CNP   amiodarone (PACERONE) 400 MG tablet Take 1 tablet by mouth 2 times daily for 7 days, THEN 0.5 tablets 2 times daily for 7 diphenhydrAMINE, zolpidem, ondansetron, metoclopramide, albuterol sulfate HFA, albumin human, furosemide, glucose, dextrose, glucagon (rDNA), dextrose, magnesium hydroxide, polyethylene glycol     Past Medical History:  Past Medical History:   Diagnosis Date    CAD (coronary artery disease)     CHF (congestive heart failure) (HCC)     Diabetes mellitus ([facility]     Hyperlipidemia     Hypertension         Past Surgical History:    has a past surgical history that includes Cardiac surgery; Colonoscopy; and Coronary artery bypass graft (N/A, 3/24/2020). Social History:  Reviewed. reports that he quit smoking about 25 years ago. He has never used smokeless tobacco. He reports current alcohol use of about 4.0 - 12.0 standard drinks of alcohol per week. He reports that he does not use drugs. Family History:  Reviewed. family history includes Cancer in his mother. Denies family history of sudden cardiac death, arrhythmia, premature CAD     Review of Systems:  · Constitutional: Negative for fever, night sweats, chills, weight changes, or weakness  · Skin: Negative for rash, dry skin, pruritus, bruising, bleeding, blood clots, or changes in skin pigment  · HEENT: Negative for vision changes, ringing in the ears, sore throat, dysphagia, or swollen lymph nodes  · Respiratory: Reviewed in HPI  · Cardiovascular: Reviewed in HPI  · Gastrointestinal: Negative for abdominal pain, N/V/D, constipation, or black/tarry stools  · Genito-Urinary: Negative for dysuria, incontinence, urgency, or hematuria  · Musculoskeletal: Negative for joint swelling, muscle pain, or injuries  · Neurological/Psych: Negative for confusion, seizures, headaches, balance issues or TIA-like symptoms.  No anxiety, depression, or insomnia    Physical Examination:  Vitals:    03/29/20 0830   BP: 125/71   Pulse: 75   Resp: 16   Temp: 97.6 °F (36.4 °C)   SpO2: 95%      In: 510 [P.O.:480; I.V.:30]  Out: -    Wt Readings from Last 3 Encounters: amiodarone taper dose   - Follow up with EP if recurrence of afib   Chronic systolicheart failure   - Appears compensated               ~ EF 40% per echo   - Continue medical managment    - Discussed importance of daily monitoring weight, low sodium diet and fluid restriction    All pertinent information and plan of care discussed with the EP physician. Multiple medical conditions with risk of decompensation. All questions and concerns were addressed to the patient. Alternatives to my treatment were discussed. I have discussed the above stated plan with patient and the nurse. The patient verbalized understanding and agreed with the plan. Thank you for allowing to us to participate in the care of [employer].     Brent Anne, VINI-CNP  [facility]   Office: [phone]

## 2020-03-30 NOTE — DISCHARGE SUMMARY
74.3 kg ( 163 lbs)  Discharge WT: 76.9 kg ( 169 lbs)    Patient Instructions: Activity: DO NOT LIFT, PUSH, OR PULL ANYTHING OVER 5 POUNDS FOR 8 WEEK from the day of surgery  Diet:  cardiac diet and diabetic diet  Wound Care:  [address] YOUR INCISIONS DAILY WITH A CLEAN WASHCLOTH AND ANTIBACTERIAL SOAP.  Do not wash your incisions after you have cleansed other parts of your body      Follow up with Cardiothoracic Surgeon, Dr. Lorraine Johnson  Follow up with Cardiologist, Dr. Harshil Maier

## 2020-03-31 NOTE — PROCEDURES
[facility]                     [facility], [address]                               PULMONARY FUNCTION    PATIENT NAME: Manpreet Irwin                     :        1955  MED REC NO:   [id number]                          ROOM:       2906  ACCOUNT NO:   [de-identified]                           ADMIT DATE: 2020  PROVIDER:     Maurilio Leroy MD    DATE OF PROCEDURE:  2020    Spirometry reveals decreased FVC at 1.2 liters, which is 29% predicted. FEV1 is decreased at 1.02 liters, which is 33% predicted. FEV1/FVC  ratio is normal.  Expiratory flow rates are reduced. No reproducible  maneuvers could be obtained. IMPRESSION:  No interpretable data.         Brigida Perez MD    D: 2020 15:23:47       T: 2020 16:09:56     [id number]  Job#: [id number]     Doc#: [id number]    CC:

## 2020-04-10 ENCOUNTER — TELEPHONE (OUTPATIENT)
Dept: CARDIOTHORACIC SURGERY | Age: 65
End: 2020-04-10

## 2020-04-16 ENCOUNTER — TELEPHONE (OUTPATIENT)
Dept: CARDIOTHORACIC SURGERY | Age: 65
End: 2020-04-16

## 2020-04-16 ENCOUNTER — VIRTUAL VISIT (OUTPATIENT)
Dept: CARDIOTHORACIC SURGERY | Age: 65
End: 2020-04-16

## 2020-04-16 PROCEDURE — 99024 POSTOP FOLLOW-UP VISIT: CPT | Performed by: THORACIC SURGERY (CARDIOTHORACIC VASCULAR SURGERY)

## 2020-04-16 NOTE — TELEPHONE ENCOUNTER
[facility], left order tor remove CT stitch at next home care visit. Prevention sheet and card with next appointment date and time mailed to Mr. Mardella Phalen.

## 2020-04-17 ENCOUNTER — VIRTUAL VISIT (OUTPATIENT)
Dept: CARDIOLOGY CLINIC | Age: 65
End: 2020-04-17
Payer: COMMERCIAL

## 2020-04-17 ENCOUNTER — TELEPHONE (OUTPATIENT)
Dept: CARDIOLOGY CLINIC | Age: 65
End: 2020-04-17

## 2020-04-17 VITALS
WEIGHT: 161 LBS | HEART RATE: 93 BPM | SYSTOLIC BLOOD PRESSURE: 135 MMHG | BODY MASS INDEX: 24.4 KG/M2 | HEIGHT: 68 IN | DIASTOLIC BLOOD PRESSURE: 80 MMHG

## 2020-04-17 PROCEDURE — 99442 PR PHYS/QHP TELEPHONE EVALUATION 11-20 MIN: CPT | Performed by: NURSE PRACTITIONER

## 2020-04-17 NOTE — PROGRESS NOTES
intubated in the ED. Troponin was mildly elevated. He has a history of noncompliance and has uncontrolled diabetes  ~hypertensive emergency. Presents encephalopathic with facial droop and possible left-sided weakness. Head CT nonacute. No evidence of bleed. Patient presents well outside the TPA window greater than 5 hours from going to bed around 10 PM according to wife. In addition I suspect symptoms from hypertensive emergency rather than acute thromboembolic disease. Due to severe hypertension upon arrival confirmed in both arms (228/150 left upper extremity and 227 152 right upper extremity). Patient given 20 mg labetalol and RSI performed due to hypoxemic respiratory failure. Cath showed multivessel disease . He is being transferred to [facility] for CABG pending transfer arrangements. He remains critically ill   Bronchoscopy 3/12/20. Large amount of purulent secretions. Cultures and PCR's are pending. Cardiac catheterization was extensive coronary artery disease bypass surgery is planned. Chest x-ray essentially unchanged, perhaps improved right-sided airspace disease with bilateral pleural effusions still present. Afebrile    Acute IWMI,EF 25-30%; Troponin trend: 1.01, 0.976  Pre-op: acute hypoxic resp failure d/t new onset acute s-HF and poss aspirate PNA  Encephalopathy : neg MRI   -3/17: sedation vacation nwith focal deviation, and no purpusful movement or following comamnds per nursing    -neuro status: Apparently altered at time of ER arrival, intubated ever since.  Recommend ativan gtt given excessive alcohol use and  wean propofol if ok with neuro and CC   -When extubated, 3/18,  right side facial droop noted; by 3/19 mental status much improved   -became agitated/confused : evaluated by pul (hx of alcoholism and poss withdrawal)  QUOC Dunne (:  1955) has requested an audio/video evaluation for the following concern(s): CAD s/p CABG & CM  ~pt unable to connect for video >> constipation, abdominal pain or changes in bowel habits. : No urinary frequency, urgency, incontinence hematuria or dysuria. SKIN: No cyanosis or skin lesions. MUSCULOSKELETAL: No new muscle or joint pain. NEUROLOGICAL: No syncope or TIA-like symptoms. PSYCHIATRIC: No anxiety, pain, insomnia or depression    Subjective:   He's been pretty good. He has some inc discomfort, not bad. There is no SOB/BARBER. The patient denies orthopnea/PND. He has restless nights, tossing and turning. The patient does not have swelling. The patients weight is stable: 160-62# . The patient is not experiencing palpitations or dizziness. These symptoms are improving since discharged ~ 3 weeks ago. His BP has been < 130/    With regard to medication therapy the patient has been compliant with prescribed regimen. They have tolerated therapy to date. Allergies   Allergen Reactions    Lipitor [Atorvastatin]        Vital Signs: (As obtained by patient/caregiver or practitioner observation)  VITALS:  /80 (Site: Left Wrist, Position: Sitting, Cuff Size: Large Adult)   Pulse 93   Ht 5' 7.5\" (1.715 m)   Wt 161 lb (73 kg)   BMI 24.84 kg/m²       Objective:     DATA:    Lab Results   Component Value Date    ALT 53 (H) 03/17/2020    AST 47 (H) 03/17/2020    ALKPHOS 76 03/17/2020    BILITOT <0.2 03/17/2020     Lab Results   Component Value Date    CREATININE 0.7 (L) 03/27/2020    BUN 24 (H) 03/27/2020     03/27/2020    K 4.1 03/27/2020     03/27/2020    CO2 27 03/27/2020     Lab Results   Component Value Date    TSH 4.47 (H) 03/27/2020     Lab Results   Component Value Date    WBC 12.7 (H) 03/27/2020    HGB 9.5 (L) 03/27/2020    HCT 28.4 (L) 03/27/2020    MCV 90.0 03/27/2020     03/27/2020       Lab Results   Component Value Date    TRIG 199 (H) 03/17/2020        Ref. Range 3/13/2020 20:30   Hemoglobin A1C Latest Ref Range: See comment % 8.9      Ref.  Range 3/13/2020 20:30   Pro-BNP Latest Ref Range: 0 - 124 for CABG because of involvement of distal left main, 100% RCA occlusion as well as long occlusion from proximal to distal LAD and the entire mid LAD with severe tandem 95% lesions. If CABG is declined then recommend PCI once EDP is improved and heart failure has resolved and patient is clinically stable with first evaluation of distal left main lesion.  -Pending transfer for CT surgery evaluation. Coronary angiography:   1. LM is of large caliber and gives rise to the LAD and left circumflex coronary arteries. LM appears to have 40% lesion in the distal segment before the bifurcation which is eccentric. 2. LAD system is large caliber system and has mild to moderate disease in the proximal segment however the mid segment entirely is severely diseased with tandem 95% lesions. Mid to distal segment has luminal irregularities and reaches the apex and wraps around it. 3. LCx system is large caliber system and has mild diffuse disease without any significant obstructive lesion. Gives rise to medium large caliber OM1 that has luminal irregularities. 4. RCA system is large caliber and dominant. There was mild disease in proximal to mid segment however distal segment is occluded with contrast staining in the distal segment suggestive of recent occlusion. Last Echo: 3/14/20:  Summary   *Definity contrast administered. *Left ventricle - normal in size and thickness, function is reduced with EF of 40%   *Wall motion - basal to mid septal hypokinesis.    *Aortic valve - sclerotic   *Mitral valve - mild regurgitation      Last Angiogram: 3/20/20:  Findings:               LM                        73% distal by IVUS  LAD                      90% prox, 90% mid 95% mid  CX                        OM1 - mid 50%, 50% ostial  RCA                      100% mid with staining, L-R collaterals                         LVG                      Not performed  LVEDP                 Not obtained     Intervention:         IVUS of Patient is on a beta-blocker  Patient is on an ARB  Patient is on a statin    Antiplatelet therapy has been recommended / prescribed for this patient. Education conducted on adverse reactions including bleeding was discussed. Angiotension inhibitor/angiotension receptor blocker has been prescribed / recommended for congestive heart failure. Daily weight, low sodium diet were discussed. Patient instructed to call the office with a weight gain: > 3 # over night or 5# in one week; swelling, SOB/orthopnea/PND    The patient verbalizes understanding not to stop medications without discussing with us. Discussed exercise: 30-60 minutes 7 days/week : walked around the block a couple of times yesterday and did well : 12 minutes and increasing   Discussed Low saturated fat/SARAH diet. SMBG 130. Est fluid intake / day < 64 oz    Thank you for allowing to us to participate in the care of [employer].     VINI Browne    Documentation of today's visit sent to PCP

## 2020-04-20 ENCOUNTER — TELEPHONE (OUTPATIENT)
Dept: CARDIOTHORACIC SURGERY | Age: 65
End: 2020-04-20

## 2020-04-20 NOTE — TELEPHONE ENCOUNTER
Mr. Ely Byers called, he is having trouble getting information to [facility] for insurance to cover his time off. I called the  home where he works, they faxed me the information from [facility]. I faxed the labs, EKGs, Dr Lenna Meckel notes re: NSTEMI  And the OP note to [facility], info in media if needed. I called Mr. Yasmine Braga back and informed him what I faxed.

## 2020-04-22 ENCOUNTER — TELEPHONE (OUTPATIENT)
Dept: CARDIOTHORACIC SURGERY | Age: 65
End: 2020-04-22

## 2020-04-24 PROBLEM — I10 ESSENTIAL HYPERTENSION: Status: ACTIVE | Noted: 2020-03-11

## 2020-04-24 PROBLEM — J30.9 ALLERGIC RHINITIS: Status: ACTIVE | Noted: 2020-04-24

## 2020-04-24 PROBLEM — L83 ACQUIRED ACANTHOSIS NIGRICANS: Status: ACTIVE | Noted: 2020-04-24

## 2020-04-24 PROBLEM — Z98.890 S/P LEFT ATRIAL APPENDAGE LIGATION: Status: ACTIVE | Noted: 2020-03-01

## 2020-04-24 PROBLEM — E78.5 HYPERLIPIDEMIA: Status: ACTIVE | Noted: 2020-03-11

## 2020-04-24 PROBLEM — J69.0 ASPIRATION PNEUMONIA (HCC): Status: RESOLVED | Noted: 2020-03-13 | Resolved: 2020-04-24

## 2020-04-24 PROBLEM — J96.01 ACUTE RESPIRATORY FAILURE WITH HYPOXIA (HCC): Status: RESOLVED | Noted: 2020-03-13 | Resolved: 2020-04-24

## 2020-04-24 PROBLEM — G57.60 MORTON'S METATARSALGIA: Status: ACTIVE | Noted: 2020-04-24

## 2020-05-05 ENCOUNTER — TELEPHONE (OUTPATIENT)
Dept: CARDIOTHORACIC SURGERY | Age: 65
End: 2020-05-05

## 2020-05-07 ENCOUNTER — TELEPHONE (OUTPATIENT)
Dept: CARDIOTHORACIC SURGERY | Age: 65
End: 2020-05-07

## 2020-05-18 ENCOUNTER — OFFICE VISIT (OUTPATIENT)
Dept: CARDIOLOGY CLINIC | Age: 65
End: 2020-05-18
Payer: COMMERCIAL

## 2020-05-18 VITALS
SYSTOLIC BLOOD PRESSURE: 130 MMHG | OXYGEN SATURATION: 98 % | HEART RATE: 72 BPM | WEIGHT: 159 LBS | DIASTOLIC BLOOD PRESSURE: 70 MMHG | BODY MASS INDEX: 24.1 KG/M2 | HEIGHT: 68 IN

## 2020-05-18 PROCEDURE — 93000 ELECTROCARDIOGRAM COMPLETE: CPT | Performed by: NURSE PRACTITIONER

## 2020-05-18 PROCEDURE — 99214 OFFICE O/P EST MOD 30 MIN: CPT | Performed by: NURSE PRACTITIONER

## 2020-05-18 RX ORDER — LOVASTATIN 40 MG/1
TABLET ORAL
COMMUNITY
End: 2020-10-02

## 2020-05-18 NOTE — PROGRESS NOTES
each by Does not apply route daily 100 each 3    aspirin 325 MG EC tablet Take 1 tablet by mouth daily 30 tablet 3    meclizine (ANTIVERT) 25 MG tablet Take 25 mg by mouth 3 times daily as needed      losartan (COZAAR) 25 MG tablet Take 25 mg by mouth daily      metoprolol tartrate (LOPRESSOR) 25 MG tablet Take 25 mg by mouth 2 times daily      tiZANidine (ZANAFLEX) 4 MG tablet Take 4 mg by mouth every 8 hours as needed      lovastatin (MEVACOR) 40 MG tablet lovastatin 40 mg tablet   Take 1 tablet every day by oral route. No current facility-administered medications for this visit. REVIEW OF SYSTEMS:    CONSTITUTIONAL: No major weight gain or loss, fatigue, weakness, night sweats or fever. HEENT: No new vision difficulties or ringing in the ears. RESPIRATORY: No new SOB, PND, orthopnea or cough. CARDIOVASCULAR: See HPI  GI: No nausea, vomiting, diarrhea, constipation, abdominal pain or changes in bowel habits. : No urinary frequency, urgency, incontinence hematuria or dysuria. SKIN: No cyanosis or skin lesions. MUSCULOSKELETAL: No new muscle or joint pain. NEUROLOGICAL: No syncope or TIA-like symptoms. PSYCHIATRIC: No anxiety, pain, insomnia or depression    Objective:   PHYSICAL EXAM:       Vitals:    05/18/20 1121 05/18/20 1149   BP: 130/70 130/70   Site: Left Upper Arm    Position: Sitting    Cuff Size: Medium Adult    Pulse: 72    SpO2: 98%    Weight: 159 lb (72.1 kg)    Height: 5' 7.5\" (1.715 m)         VITALS:  /70 (Site: Left Upper Arm, Position: Sitting, Cuff Size: Medium Adult)   Pulse 72   Ht 5' 7.5\" (1.715 m)   Wt 159 lb (72.1 kg)   SpO2 98%   BMI 24.54 kg/m²   CONSTITUTIONAL: Cooperative, no apparent distress, and appears well nourished / developed  NEUROLOGIC:  Awake and orientated to person, place and time. PSYCH: Calm affect. SKIN: Warm and dry.   HEENT: Sclera non-icteric, normocephalic, neck supple, no elevation of JVP, normal carotid pulses with no bruits and thyroid normal size. LUNGS:  No increased work of breathing and clear to auscultation, no crackles or wheezing  CARDIOVASCULAR:  Regular rate 76 and rhythm with no murmurs, gallops, rubs, or abnormal heart sounds, normal PMI. The apical impulses not displaced  JVP less than 8 cm H2O  Heart tones are crisp and normal  Cervical veins are not engorged  The carotid upstroke is normal in amplitude and contour without delay or bruit  JVP is not elevated  ABDOMEN:  Normal bowel sounds, non-distended and non-tender to palpation  EXT: No edema, no calf tenderness. Pulses are present bilaterally. DATA:    Lab Results   Component Value Date    ALT 53 (H) 03/17/2020    AST 47 (H) 03/17/2020    ALKPHOS 76 03/17/2020    BILITOT <0.2 03/17/2020     Lab Results   Component Value Date    CREATININE 0.7 (L) 03/27/2020    BUN 24 (H) 03/27/2020     03/27/2020    K 4.1 03/27/2020     03/27/2020    CO2 27 03/27/2020     Lab Results   Component Value Date    TSH 4.47 (H) 03/27/2020     Lab Results   Component Value Date    WBC 12.7 (H) 03/27/2020    HGB 9.5 (L) 03/27/2020    HCT 28.4 (L) 03/27/2020    MCV 90.0 03/27/2020     03/27/2020     No components found for: CHLPL  Lab Results   Component Value Date    TRIG 199 (H) 03/17/2020     Radiology Review:  Pertinent images / reports were reviewed as a part of this visit and reveals the following:    Ct-angio Chest W And/or Wo Con W/post Img : Result Date: 3/11/2020  IMPRESSION: 1. Endotracheal tube within the thoracic inlet which appears to be in appropriate position. 2. Mild pulmonary edema. 3. Moderate bilateral pleural effusions with presumed basilar atelectasis. Superimposed infection is not excluded. 4. No pulmonary embolus. 5. No thoracic aortic dissection or aneurysm.      Echo: 3/11/20:  SUMMARY:  1. Left ventricle: The cavity size was mildly to moderately     dilated. Wall thickness was normal. Systolic function was     severly reduced.  The estimated ejection fraction was in the     range of 25% to 30%. Moderate diffuse hypokinesis with no     identifiable regional variations. [place] not well visualized.     Apical thrombus cannot be completely excluded based on this     study. Doppler parameters are consistent with abnormal left     ventricular relaxation (grade 1 diastolic dysfunction). The     longitudal strain study is abnormal.  2. Aortic valve: There was a small, 1.0cm (L) x 0.5cm (W), calcific     nodule noted on the aortic valve. An old, healed vegetation     cannot be completely excluded. Peak velocity (S): 0.96m/sec.     Mean gradient (S): 2mm Hg. VTI ratio of LVOT to aortic valve:     0.86. Valve area (VTI): 2.72cm^2. 3. Mitral valve: The annulus was mildly calcified. The leaflets     were mildly calcified. There was mild regurgitation. Mean     gradient (D): 2mm Hg. Valve area by continuity equation (using     LVOT flow): 1.7cm^2. 4. Left atrium: The atrium was moderately dilated. 5. Right ventricle: The cavity size was normal. Wall thickness was     normal. Systolic function was normal.  6. Pericardium, extracardiac: There was no pericardial effusion.     [facility] 3/12: Evaluation for CABG because of involvement of distal left main, 100% RCA occlusion as well as long occlusion from proximal to distal LAD and the entire mid LAD with severe tandem 95% lesions. If CABG is declined then recommend PCI once EDP is improved and heart failure has resolved and patient is clinically stable with first evaluation of distal left main lesion.  -Pending transfer for CT surgery evaluation. Coronary angiography:   1. LM is of large caliber and gives rise to the LAD and left circumflex coronary arteries. LM appears to have 40% lesion in the distal segment before the bifurcation which is eccentric. 2. LAD system is large caliber system and has mild to moderate disease in the proximal segment however the mid segment entirely is severely diseased with tandem 95% lesions.  Mid

## 2020-05-28 ENCOUNTER — OFFICE VISIT (OUTPATIENT)
Dept: CARDIOTHORACIC SURGERY | Age: 65
End: 2020-05-28

## 2020-05-28 ENCOUNTER — TELEPHONE (OUTPATIENT)
Dept: CARDIOTHORACIC SURGERY | Age: 65
End: 2020-05-28

## 2020-05-28 VITALS
HEART RATE: 73 BPM | OXYGEN SATURATION: 96 % | HEIGHT: 67 IN | TEMPERATURE: 97 F | SYSTOLIC BLOOD PRESSURE: 152 MMHG | RESPIRATION RATE: 14 BRPM | WEIGHT: 164.2 LBS | DIASTOLIC BLOOD PRESSURE: 90 MMHG | BODY MASS INDEX: 25.77 KG/M2

## 2020-05-28 PROCEDURE — 99024 POSTOP FOLLOW-UP VISIT: CPT | Performed by: THORACIC SURGERY (CARDIOTHORACIC VASCULAR SURGERY)

## 2020-05-28 RX ORDER — LOSARTAN POTASSIUM 25 MG/1
50 TABLET ORAL DAILY
Status: SHIPPED | COMMUNITY
Start: 2020-05-28 | End: 2020-10-02 | Stop reason: SDUPTHER

## 2020-07-16 ENCOUNTER — HOSPITAL ENCOUNTER (OUTPATIENT)
Dept: CARDIAC REHAB | Age: 65
Setting detail: THERAPIES SERIES
Discharge: HOME | End: 2020-07-16
Payer: COMMERCIAL

## 2020-07-16 VITALS
WEIGHT: 162.8 LBS | HEART RATE: 66 BPM | HEIGHT: 68 IN | BODY MASS INDEX: 24.67 KG/M2 | RESPIRATION RATE: 16 BRPM | DIASTOLIC BLOOD PRESSURE: 84 MMHG | SYSTOLIC BLOOD PRESSURE: 128 MMHG

## 2020-07-16 PROCEDURE — 93797 PHYS/QHP OP CAR RHAB WO ECG: CPT

## 2020-07-16 ASSESSMENT — PATIENT HEALTH QUESTIONNAIRE - PHQ9: SUM OF ALL RESPONSES TO PHQ QUESTIONS 1-9: 2

## 2020-07-16 NOTE — PROGRESS NOTES
[facility] Cardiac Rehabilitation Initial Evaluation    Radha Tony       1955     [id number]    Share medical information:  Yes - Cara Corona (wife): [phone]    Cardiac History    CABG  EF:  40% (3/14/2020)  Arrhythmia:  post op atrial fibrillation  CHF - last admission:  pt. does not know    Physical Assessment     General Appearance   Height:  5' 7.5\" (171.5 cm)  Weight:  162 lb 12.8 oz (73.8 kg)(162.8 lb)   BMI:  25.2  Skin color: Within Defined Limits      Cardiovascular Assessment  BP Sittin/84(right arm sitting)  Sittin/80(left arm)  Standin/80(left arm)  Heart rate:   66 Right, Radial   Heart sounds:   Regular S1, S2, No adventitious heart sounds    Respiratory Assessment  Resp rate: 16     Regular  Normal  L Breath Sounds: Clear   R Breath Sounds: Clear  SpO2:     Quality/Effort:   Unlabored  Sleep Apnea:  No  CPAP  No  Oxygen  No     Sleeping Habits:  Pt. States that he has trouble at times with sleep. Edema:  No      Orthopedic/Exercise Limitations:  Yes - pt. Has issues with left shoulder. Unable to raise above head at this time due to pain. Pain:   Do you have pain?:  yes - left shoulder when he raises arm. No pain at this time. Fall Risk Assessment     History of falling with or without injury: No  Use of ambulatory aid: No  Difficulty walking/impaired gait: No  Numbness in feet: No  Vision changes: No  Dizziness: No  Shortness of breath: No  Current medications include but not limited to: ACE, ARB, Beta  Blocker  Other fall risk : No  Outpatient fall risk intervention strategies: Fall risk education provided    Abuse / Neglect  Physical/behavioral signs of abuse/neglect   No    Do you feel safe at home   Yes    Advanced Directives  Patient has Advanced Directives:  No  Patient given Advanced Directive pack:  Not interested    Vaccinations  Influenza (annual):  Yes  Pneumonia:  Yes- per pt. This is up to date. Pt.  Arrived to

## 2020-10-02 ENCOUNTER — OFFICE VISIT (OUTPATIENT)
Dept: CARDIOLOGY CLINIC | Age: 65
End: 2020-10-02
Payer: COMMERCIAL

## 2020-10-02 VITALS
HEIGHT: 68 IN | WEIGHT: 170.8 LBS | SYSTOLIC BLOOD PRESSURE: 136 MMHG | BODY MASS INDEX: 25.88 KG/M2 | DIASTOLIC BLOOD PRESSURE: 80 MMHG | HEART RATE: 80 BPM

## 2020-10-02 PROCEDURE — 99214 OFFICE O/P EST MOD 30 MIN: CPT | Performed by: INTERNAL MEDICINE

## 2020-10-02 RX ORDER — AMIODARONE HYDROCHLORIDE 200 MG/1
200 TABLET ORAL DAILY
COMMUNITY
End: 2020-10-02 | Stop reason: ALTCHOICE

## 2020-10-02 RX ORDER — GABAPENTIN 100 MG/1
100 CAPSULE ORAL
COMMUNITY
End: 2021-10-19 | Stop reason: ALTCHOICE

## 2020-10-02 RX ORDER — LOSARTAN POTASSIUM 50 MG/1
50 TABLET ORAL DAILY
Qty: 90 TABLET | Refills: 3 | Status: SHIPPED | OUTPATIENT
Start: 2020-10-02 | End: 2020-10-21 | Stop reason: SDUPTHER

## 2020-10-02 RX ORDER — FUROSEMIDE 20 MG/1
20 TABLET ORAL DAILY
Qty: 90 TABLET | Refills: 3 | Status: SHIPPED | OUTPATIENT
Start: 2020-10-02 | End: 2020-10-21 | Stop reason: SDUPTHER

## 2020-10-02 RX ORDER — FUROSEMIDE 20 MG/1
20 TABLET ORAL DAILY
COMMUNITY
End: 2020-10-02 | Stop reason: SDUPTHER

## 2020-10-02 RX ORDER — TADALAFIL 20 MG/1
20 TABLET ORAL PRN
COMMUNITY

## 2020-10-02 RX ORDER — PRAVASTATIN SODIUM 20 MG
10 TABLET ORAL DAILY
COMMUNITY
End: 2020-10-02 | Stop reason: ALTCHOICE

## 2020-10-02 RX ORDER — EMPAGLIFLOZIN 25 MG/1
25 TABLET, FILM COATED ORAL DAILY
COMMUNITY
End: 2020-10-20 | Stop reason: SDUPTHER

## 2020-10-02 RX ORDER — ATORVASTATIN CALCIUM 40 MG/1
40 TABLET, FILM COATED ORAL DAILY
Qty: 90 TABLET | Refills: 3 | Status: SHIPPED | OUTPATIENT
Start: 2020-10-02 | End: 2021-10-19 | Stop reason: ALTCHOICE

## 2020-10-02 NOTE — PROGRESS NOTES
[facility]   Cardiac Consultation    Referring Provider:  Jose Ovalle DO     Chief Complaint   Patient presents with    Congestive Heart Failure    Coronary Artery Disease    Hypertension        History of Present Illness:  Mr. Yodit Ortiz has a history of croonary artery disease, hypertension, hyperlipidemia, and diabetes mellitus. He presented to [facility] in 2020 with ACS, hypertensive emergency, and respiratory failure requiring intubation. He was diagnosed with IWMI and acute systolic heart failure. LHC showed three vessel disease, and he underwent CABG x 3/JORGE ALBERTO on 3/24/2020 at [facility]. He had a bout of post-op Atrial fib but was discharged in sinus rhythm. He is a nonsmoker. He was back in the ER at [facility] on 20 with shortness of breath. Of note, he was not taking his medications as he ran out of refills and did not think he needed to take them anymore. His medications were refilled and was instructed to see his cardiologist.       Today, he states he was doing well up to the time of his recent ER visit. His breathing has improved since his ER visit and being back on cardiac medications. He denies exertional chest pain, light headedness, palpitations, or edema. He works at a  home in [place] and does multiple jobs there. He denies activity limitations. He was referred to cardiac rehab, but declined participation in rehab as he could not take time off of work. Past Medical History:   has a past medical history of CAD (coronary artery disease), CHF (congestive heart failure) ([address] Diabetes mellitus ([address] Hyperlipidemia, Hypertension, S/P CABG x 3, and S/P left atrial appendage ligation. Surgical History:   has a past surgical history that includes Cardiac surgery; Colonoscopy; and Coronary artery bypass graft (N/A, 3/24/2020). Social History:   reports that he quit smoking about 25 years ago. His smoking use included cigarettes.  He has a 26.00 pack-year smoking history. He has never used smokeless tobacco. He reports current alcohol use of about 4.0 - 12.0 standard drinks of alcohol per week. He reports that he does not use drugs. Family History:  family history includes Cancer in his mother. Home Medications:  Prior to Admission medications    Medication Sig Start Date End Date Taking? Authorizing Provider   furosemide (LASIX) 20 MG tablet Take 20 mg by mouth daily   Yes Historical Provider, MD   amiodarone (CORDARONE) 200 MG tablet Take 200 mg by mouth daily   Yes Historical Provider, MD   gabapentin (NEURONTIN) 100 MG capsule Take 100 mg by mouth. Take 1 tablet at night for 1 week increase to 2 tablets if needed   Yes Historical Provider, MD   empagliflozin (JARDIANCE) 25 MG tablet Take 25 mg by mouth daily   Yes Historical Provider, MD   pravastatin (PRAVACHOL) 20 MG tablet Take 10 mg by mouth daily   Yes Historical Provider, MD   tadalafil (CIALIS) 20 MG tablet Take 20 mg by mouth as needed for Erectile Dysfunction   Yes Historical Provider, MD   losartan (COZAAR) 25 MG tablet Take 2 tablets by mouth daily 5/28/20  Yes Alanna Gonzalez MD   metoprolol tartrate (LOPRESSOR) 25 MG tablet Take 25 mg by mouth 2 times daily  5/28/20  Yes Alanna Gonzalez MD   insulin glargine (LANTUS;BASAGLAR) 100 UNIT/ML injection pen Inject 30 Units into the skin nightly  Patient taking differently: Inject 45 Units into the skin nightly  3/29/20  Yes Desire Oliveros MD   Lancets MISC 1 each by Does not apply route 2 times daily 3/29/20  Yes Desire Oliveros MD   blood glucose monitor strips Test 3  times a day & as needed for symptoms of irregular blood glucose. 3/29/20  Yes Winnie Maxwell MD   Insulin Pen Needle 30G X 8 MM MISC 1 each by Does not apply route daily 3/29/20  Yes Desire Oliveros MD   aspirin 325 MG EC tablet Take 1 tablet by mouth daily 3/28/20  Yes Leah Sanchez APRN - CNP        Allergies:  Lipitor [atorvastatin];  Lisinopril; Metformin; and Statins cyanosis of the extremities. · No edema  · Femoral Arteries: 2+ and equal  · Pedal Pulses: 2+ and equal   Abdomen:  · No masses or tenderness  · Liver/Spleen: No Abnormalities Noted  Neurological/Psychiatric:  · Alert and oriented in all spheres  · Moves all extremities well  · Exhibits normal gait balance and coordination  · No abnormalities of mood, affect, memory, mentation, or behavior are noted      Lab Results   Component Value Date    TRIG 199 03/17/2020     Lab Results   Component Value Date     03/27/2020    K 4.1 03/27/2020    K 3.9 03/24/2020     03/27/2020    CO2 27 03/27/2020    GLUCOSE 165 03/27/2020    BUN 24 03/27/2020    CREATININE 0.7 03/27/2020    CALCIUM 9.2 03/27/2020    GFRAA >60 03/27/2020     Lab Results   Component Value Date    ALT 53 03/17/2020    AST 47 03/17/2020     [facility] 3/12/20 ([facility]) :   Evaluation for CABG because of involvement of distal left main, 100% RCA occlusion as well as long occlusion from proximal to distal LAD and the entire mid LAD with severe tandem 95% lesions. If CABG is declined then recommend PCI once EDP is improved and heart failure has resolved and patient is clinically stable with first evaluation of distal left main lesion.  -Pending transfer for CT surgery evaluation. Coronary angiography:   1. LM is of large caliber and gives rise to the LAD and left circumflex coronary arteries. LM appears to have 40% lesion in the distal segment before the bifurcation which is eccentric. 2. LAD system is large caliber system and has mild to moderate disease in the proximal segment however the mid segment entirely is severely diseased with tandem 95% lesions. Mid to distal segment has luminal irregularities and reaches the apex and wraps around it. 3. LCx system is large caliber system and has mild diffuse disease without any significant obstructive lesion. Gives rise to medium large caliber OM1 that has luminal irregularities.   4. RCA system is large medication compliance  - Amiodarone can be stopped as PAF was brief and occurred postop. No palpitations. 9/27/20 EKG at [facility] showed NSR      Ischemic cardiomyopathy  Chronic systolic heart failure  - treated for acute systolic heart failure during 3/2020 hospitalization  - 3/14/20 Echo - LVEF 40% with basal to mid septal hypokinesis. - tx with Losartan, furosemide 20 mg daily  - had recent shortness of breath when off cardiac meds, but no shortness of breath currently and appears compensated on exam.      Hypertension  - Blood pressure 136/80, pulse 80, height 5' 7.5\" (1.715 m), weight 170 lb 12.8 oz (77.5 kg). - tx with Losartan, Metoprolol  - Care Everywhere labs - 9/28/20 -- K+ 3.9,m Creat 1.2    Hyperlipidemia  - tx with Pravastatin 40 mg  - 6/30/20 Care Everywhere labs - TC- 210, TG- 101, HDL- 58, .  0/25/20 - AST - 42, ALT- 54  - lipids sub-optimal on Pravastatin. Will switch statin to Atorvastatin 40 mg to improve lipid status. Plan:  Ros Brambila has a stable status. Labs reviewed this visit and discussed. 1.  Stop Amiodarone  2. Refills given for furosemide 20 mg , Metoprolol 25 mg twice a day, Losartan 50 mg daily. 3.  Stop Pravastatin and start Atorvastatin 40 mg daily  4. Other refills to be obtained from PCP   5. Call our office with any concerning cardiac symptoms  6. Follow up in two months       Patient's problem list, medications, allergies, past medical, surgical, social and family histories were reviewed and updated as appropriate. I appreciate the opportunity of cooperating in the care of this individual.    Juanito Mcgill. Jani Streeter M.D., [address] attestation: This note was scribed in the presence of Reynold Libman E. [place] Cobb, M.D. by Kali Peraza RN    The scribe's documentation has been prepared under my direction and personally reviewed by me in its entirety.  I confirm that the note above accurately reflects all work, treatment, procedures, and medical decision making

## 2020-10-02 NOTE — LETTER
[address] [id number]  Phone: [phone]  Fax: [phone]    Beverly Valencia MD        2020     Saira Campo, [address] [place] [address] [phone]    Patient: Gaetano Posada  MR Number: [id number]  YOB: 1955  Date of Visit: 10/2/2020    Dear Dr. Saira Campo:    Below are the relevant portions of my assessment and plan of care. [facility]   Cardiac Consultation    Referring Provider:  Saira Campo DO     Chief Complaint   Patient presents with    Congestive Heart Failure    Coronary Artery Disease    Hypertension        History of Present Illness:  Mr. Gaetano Posada has a history of croonary artery disease, hypertension, hyperlipidemia, and diabetes mellitus. He presented to [facility] in 2020 with ACS, hypertensive emergency, and respiratory failure requiring intubation. He was diagnosed with IWMI and acute systolic heart failure. LHC showed three vessel disease, and he underwent CABG x 3/JORGE ALBERTO on 3/24/2020 at [facility]. He had a bout of post-op Atrial fib but was discharged in sinus rhythm. He is a nonsmoker. He was back in the ER at [facility] on 20 with shortness of breath. Of note, he was not taking his medications as he ran out of refills and did not think he needed to take them anymore. His medications were refilled and was instructed to see his cardiologist.       Today, he states he was doing well up to the time of his recent ER visit. His breathing has improved since his ER visit and being back on cardiac medications. He denies exertional chest pain, light headedness, palpitations, or edema. 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 He denies activity limitations. He was referred to cardiac rehab, but declined participation in rehab as he could not take time off of work. Past Medical History:   has a past medical history of CAD (coronary artery disease), CHF (congestive heart failure) ([facility] [address] Diabetes mellitus ([facility] [address] Hyperlipidemia, Hypertension, S/P CABG x 3, and S/P left atrial appendage ligation. Surgical History:   has a past surgical history that includes Cardiac surgery; Colonoscopy; and Coronary artery bypass graft (N/A, 3/24/2020). Social History:   reports that he quit smoking about 25 years ago. His smoking use included cigarettes. He has a 26.00 pack-year smoking history. He has never used smokeless tobacco. He reports current alcohol use of about 4.0 - 12.0 standard drinks of alcohol per week. He reports that he does not use drugs. Family History:  family history includes Cancer in his mother. Home Medications:  Prior to Admission medications    Medication Sig Start Date End Date Taking? Authorizing Provider   furosemide (LASIX) 20 MG tablet Take 20 mg by mouth daily   Yes Historical Provider, MD   amiodarone (CORDARONE) 200 MG tablet Take 200 mg by mouth daily   Yes Historical Provider, MD   gabapentin (NEURONTIN) 100 MG capsule Take 100 mg by mouth.  Take 1 tablet at night for 1 week increase to 2 tablets if needed   Yes Historical Provider, MD   empagliflozin (JARDIANCE) 25 MG tablet Take 25 mg by mouth daily   Yes Historical Provider, MD   pravastatin (PRAVACHOL) 20 MG tablet Take 10 mg by mouth daily   Yes Historical Provider, MD   tadalafil (CIALIS) 20 MG tablet Take 20 mg by mouth as needed for Erectile Dysfunction   Yes Historical Provider, MD   losartan (COZAAR) 25 MG tablet Take 2 tablets by mouth daily 5/28/20  Yes Miles No, MD   metoprolol tartrate (LOPRESSOR) 25 MG tablet Take 25 mg by mouth 2 times daily  5/28/20  Yes Miles No, MD   insulin glargine (LANTUS;BASAGLAR) 100 UNIT/ML injection pen Inject 30 Units into the skin nightly  Patient taking differently: Inject 45 Units into the skin nightly  3/29/20  Yes Daren Esparza MD Lancets MISC 1 each by Does not apply route 2 times daily 3/29/20  Yes Yenifer Almendarez MD   blood glucose monitor strips Test 3  times a day & as needed for symptoms of irregular blood glucose. 3/29/20  Yes Winnie Maxwell MD   Insulin Pen Needle 30G X 8 MM MISC 1 each by Does not apply route daily 3/29/20  Yes Yenifer Almendarez MD   aspirin 325 MG EC tablet Take 1 tablet by mouth daily 3/28/20  Yes Guillermo Shepherd APRN - CNP        Allergies:  Lipitor [atorvastatin]; Lisinopril; Metformin; and Statins     Review of Systems:   · Constitutional: there has been no unanticipated weight loss. There's been no change in energy level, sleep pattern, or activity level. · Eyes: No visual changes or diplopia. No scleral icterus. · ENT: No Headaches, hearing loss or vertigo. No mouth sores or sore throat. · Cardiovascular: Reviewed in HPI  · Respiratory: No cough or wheezing, no sputum production. No hematemesis. Breathing improved since ER visit 9/25/20  · Gastrointestinal: No abdominal pain, appetite loss, blood in stools. No change in bowel or bladder habits. · Genitourinary: No dysuria, trouble voiding, or hematuria. · Musculoskeletal:  No gait disturbance, weakness or joint complaints. · Integumentary: No rash or pruritis. · Neurological: No headache, diplopia, change in muscle strength, numbness or tingling. No change in gait, balance, coordination, mood, affect, memory, mentation, behavior. · Psychiatric: No anxiety, no depression. · Endocrine: No malaise, fatigue or temperature intolerance. No excessive thirst, fluid intake, or urination. No tremor. · Hematologic/Lymphatic: No abnormal bruising or bleeding, blood clots or swollen lymph nodes. · Allergic/Immunologic: No nasal congestion or hives.     Physical Examination:    Vitals:    10/02/20 0817   BP: 136/80   Pulse: 80        Wt Readings from Last 1 Encounters:   10/02/20 170 lb 12.8 oz (77.5 kg) Constitutional and General Appearance: Appears stated age, NAD  Skin:good turgor,intact without lesions  HEENT: EOMI ,normal  Neck:no JVD    Respiratory:  · Normal excursion and expansion without use of accessory muscles  · Resp Auscultation: Normal breath sounds without dullness  Cardiovascular:  · The apical impulses not displaced  · Heart tones are crisp and normal  · Cervical veins are not engorged  · The carotid upstroke is normal in amplitude and contour without delay or bruit  · Peripheral pulses are symmetrical and full  · There is no clubbing, cyanosis of the extremities. · No edema  · Femoral Arteries: 2+ and equal  · Pedal Pulses: 2+ and equal   Abdomen:  · No masses or tenderness  · Liver/Spleen: No Abnormalities Noted  Neurological/Psychiatric:  · Alert and oriented in all spheres  · Moves all extremities well  · Exhibits normal gait balance and coordination  · No abnormalities of mood, affect, memory, mentation, or behavior are noted      Lab Results   Component Value Date    TRIG 199 03/17/2020     Lab Results   Component Value Date     03/27/2020    K 4.1 03/27/2020    K 3.9 03/24/2020     03/27/2020    CO2 27 03/27/2020    GLUCOSE 165 03/27/2020    BUN 24 03/27/2020    CREATININE 0.7 03/27/2020    CALCIUM 9.2 03/27/2020    GFRAA >60 03/27/2020     Lab Results   Component Value Date    ALT 53 03/17/2020    AST 47 03/17/2020     C 3/12/20 ([address]) :   Evaluation for CABG because of involvement of distal left main, 100% RCA occlusion as well as long occlusion from proximal to distal LAD and the entire mid LAD with severe tandem 95% lesions. If CABG is declined then recommend PCI once EDP is improved and heart failure has resolved and patient is clinically stable with first evaluation of distal left main lesion.  -Pending transfer for CT surgery evaluation. Coronary angiography:   1.  LM is of large caliber and gives rise to the LAD and left circumflex coronary arteries. LM appears to have 40% lesion in the distal segment before the bifurcation which is eccentric. 2. LAD system is large caliber system and has mild to moderate disease in the proximal segment however the mid segment entirely is severely diseased with tandem 95% lesions. Mid to distal segment has luminal irregularities and reaches the apex and wraps around it. 3. LCx system is large caliber system and has mild diffuse disease without any significant obstructive lesion. Gives rise to medium large caliber OM1 that has luminal irregularities. 4. RCA system is large caliber and dominant. There was mild disease in proximal to mid segment however distal segment is occluded with contrast staining in the distal segment suggestive of recent occlusion. Echo 3/14/20:   Summary   *Definity contrast administered. *Left ventricle - normal in size and thickness, function is reduced with EF   of 40%   *Wall motion - basal to mid septal hypokinesis. *Aortic valve - sclerotic   *Mitral valve - mild regurgitation     Angiogram: 3/20/20: ([facility])  Findings:               LM                        73% distal by IVUS  LAD                      90% prox, 90% mid 95% mid  CX                        OM1 - mid 50%, 50% ostial  RCA                      100% mid with staining, L-R collaterals                         LVG                      Not performed  LVEDP                 Not obtained     Intervention:         IVUS of LM and ostial Cx - LM - 73% stenosis, area 5.7     Post Cath Dx:       Severe MVD - refer for CABG  No statin due to allergy  Continue asa     OR: 3/24/20:  TCPB,  Urgent CABG X3, EVH left saphenous vein, LIMA grafting X1, SVG x2,  (LIMA-LAD, SV-OM1, SV-PDA).   Ligation LEFT ATRIAL APPENDAGE - 45 atriclip      Assessment/Plan:    1. Coronary artery disease involving native coronary artery of native heart without angina pectoris    2.  Ischemic cardiomyopathy 3. Congestive heart failure of unknown etiology ([facility] [address]    4. Essential hypertension    5. Hyperlipidemia, unspecified hyperlipidemia type        Coronary artery disease  - 3/2020 -  s/p IWMI, LHC performed at [facility] and [facility] showing severe multivessel disease  - 3/24/20 s/p CABG x 3 with JORGE ALBERTO (atriclip). Post-op AF but discharged in SR  - back in ER on 9/25/20 with shortness of breath but had run out of cardiac medications due to a misunderstanding. Meds refilled. - tx with ASA, Metoprolol, Losartan, statin  - no recurrent shortness of breath and denies chest pain since back on cardiac meds. Reinforced importance of medication compliance  - Amiodarone can be stopped as PAF was brief and occurred postop. No palpitations. 9/27/20 EKG at [facility] showed NSR      Ischemic cardiomyopathy  Chronic systolic heart failure  - treated for acute systolic heart failure during 3/2020 hospitalization  - 3/14/20 Echo - LVEF 40% with basal to mid septal hypokinesis. - tx with Losartan, furosemide 20 mg daily  - had recent shortness of breath when off cardiac meds, but no shortness of breath currently and appears compensated on exam.      Hypertension  - Blood pressure 136/80, pulse 80, height 5' 7.5\" (1.715 m), weight 170 lb 12.8 oz (77.5 kg). - tx with Losartan, Metoprolol  - Care Everywhere labs - 9/28/20 -- K+ 3.9,m Creat 1.2    Hyperlipidemia  - tx with Pravastatin 40 mg  - 6/30/20 Care Everywhere labs - TC- 210, TG- 101, HDL- 58, .  0/25/20 - AST - 42, ALT- 54  - lipids sub-optimal on Pravastatin. Will switch statin to Atorvastatin 40 mg to improve lipid status. Plan:  Rosario Reno has a stable status. Labs reviewed this visit and discussed. 1.  Stop Amiodarone  2. Refills given for furosemide 20 mg , Metoprolol 25 mg twice a day, Losartan 50 mg daily. 3.  Stop Pravastatin and start Atorvastatin 40 mg daily  4. Other refills to be obtained from PCP   5.   Call our office with any concerning cardiac symptoms 6.  Follow up in two months       Patient's problem list, medications, allergies, past medical, surgical, social and family histories were reviewed and updated as appropriate. I appreciate the opportunity of cooperating in the care of this individual.    Baudilio Bales. Edward Bravo M.D., [address] [place] [address] Avenue attestation: This note was scribed in the presence of Raimundo Bravo M.D. by Jeannette Cedeno RN    The scribe's documentation has been prepared under my direction and personally reviewed by me in its entirety. I confirm that the note above accurately reflects all work, treatment, procedures, and medical decision making performed by me. If you have questions, please do not hesitate to call me. I look forward to following Debora Hendrix along with you.     Sincerely,        Emanuel Dudley MD

## 2020-10-20 NOTE — TELEPHONE ENCOUNTER
Medication Refill    Medication needing refilled:  losartan (COZAAR) 50 MG  Once daily    metoprolol tartrate (LOPRESSOR) 25 MG bid    empagliflozin (JARDIANCE) 25 MG once daily    furosemide (LASIX) 20 MG once daily      Dosage of the medication:    How are you taking this medication (QD, BID, TID, QID, PRN):    30 or 90 day supply called in: 90 day supply    [facility] are [facility] the [facility] [address] [place] [address]

## 2020-10-21 ENCOUNTER — TELEPHONE (OUTPATIENT)
Dept: CARDIOLOGY CLINIC | Age: 65
End: 2020-10-21

## 2020-10-21 RX ORDER — FUROSEMIDE 20 MG/1
20 TABLET ORAL DAILY
Qty: 90 TABLET | Refills: 3 | Status: SHIPPED | OUTPATIENT
Start: 2020-10-21

## 2020-10-21 RX ORDER — LOSARTAN POTASSIUM 50 MG/1
50 TABLET ORAL DAILY
Qty: 90 TABLET | Refills: 3 | Status: SHIPPED | OUTPATIENT
Start: 2020-10-21 | End: 2021-10-19 | Stop reason: DRUGHIGH

## 2020-10-21 RX ORDER — EMPAGLIFLOZIN 25 MG/1
25 TABLET, FILM COATED ORAL DAILY
Qty: 30 TABLET | Refills: 2 | Status: SHIPPED | OUTPATIENT
Start: 2020-10-21

## 2020-10-21 NOTE — TELEPHONE ENCOUNTER
Pt calling he wants his prescriptions filled for 90 days he said he picked up the empagliflozin (JARDIANCE) 25 MG and it was a 30 day supply.  Can this be changed on all meds to 90? pls call to advise thank you

## 2020-11-10 ENCOUNTER — TELEPHONE (OUTPATIENT)
Dept: CARDIOLOGY CLINIC | Age: 65
End: 2020-11-10

## 2020-11-10 NOTE — TELEPHONE ENCOUNTER
Spoke to the pt. Faxed the Cardiac Cath report, from 3/20/20, and admission notes from [facility] stay from the end of September.

## 2020-12-15 RX ORDER — METOPROLOL TARTRATE 50 MG/1
50 TABLET, FILM COATED ORAL 2 TIMES DAILY
Qty: 180 TABLET | Refills: 3 | Status: SHIPPED | OUTPATIENT
Start: 2020-12-15

## 2021-02-18 RX ORDER — EMPAGLIFLOZIN 25 MG/1
25 TABLET, FILM COATED ORAL DAILY
Qty: 30 TABLET | Refills: 2 | OUTPATIENT
Start: 2021-02-18

## 2021-04-16 ENCOUNTER — OFFICE VISIT (OUTPATIENT)
Dept: CARDIOLOGY CLINIC | Age: 66
End: 2021-04-16
Payer: MEDICARE

## 2021-04-16 VITALS
SYSTOLIC BLOOD PRESSURE: 140 MMHG | WEIGHT: 168.4 LBS | HEART RATE: 77 BPM | BODY MASS INDEX: 25.52 KG/M2 | OXYGEN SATURATION: 96 % | HEIGHT: 68 IN | DIASTOLIC BLOOD PRESSURE: 70 MMHG

## 2021-04-16 DIAGNOSIS — I25.10 CORONARY ARTERY DISEASE INVOLVING NATIVE CORONARY ARTERY OF NATIVE HEART WITHOUT ANGINA PECTORIS: Primary | ICD-10-CM

## 2021-04-16 DIAGNOSIS — I10 ESSENTIAL HYPERTENSION: ICD-10-CM

## 2021-04-16 DIAGNOSIS — I25.5 ISCHEMIC CARDIOMYOPATHY: ICD-10-CM

## 2021-04-16 DIAGNOSIS — E78.2 MIXED HYPERLIPIDEMIA: ICD-10-CM

## 2021-04-16 PROCEDURE — G8417 CALC BMI ABV UP PARAM F/U: HCPCS | Performed by: NURSE PRACTITIONER

## 2021-04-16 PROCEDURE — 4040F PNEUMOC VAC/ADMIN/RCVD: CPT | Performed by: NURSE PRACTITIONER

## 2021-04-16 PROCEDURE — G8427 DOCREV CUR MEDS BY ELIG CLIN: HCPCS | Performed by: NURSE PRACTITIONER

## 2021-04-16 PROCEDURE — 1123F ACP DISCUSS/DSCN MKR DOCD: CPT | Performed by: NURSE PRACTITIONER

## 2021-04-16 PROCEDURE — 3017F COLORECTAL CA SCREEN DOC REV: CPT | Performed by: NURSE PRACTITIONER

## 2021-04-16 PROCEDURE — 1036F TOBACCO NON-USER: CPT | Performed by: NURSE PRACTITIONER

## 2021-04-16 PROCEDURE — 99214 OFFICE O/P EST MOD 30 MIN: CPT | Performed by: NURSE PRACTITIONER

## 2021-04-16 NOTE — PROGRESS NOTES
[facility]     Outpatient Follow Up Note    Breanna Downs is 73 y.o. male who presents today with a history of IWMI / CAD s/p CABG with JORGE ALBERTO clip  and post-op AF; ischemic CM/EF 25-30%, CHF (pre-op) and HTN. ~please see note for detailed presentation on 3/11/20  ~episode of decompensation  when ran out of medicines    CHIEF COMPLAINT / HPI:  Follow Up secondary to CAD    Subjective:     He's been feeling pretty good. He's had no chest discomfort. He has no SOB. The patient denies orthopnea/PND. The patient does not have swelling. The patients weight is stable ~ 160#. The patient is not experiencing palpitations or dizziness. These symptoms are stable since the last OV. With regard to medication therapy the patient has been compliant with prescribed regimen. They have tolerated therapy to date.      Past Medical History:   Diagnosis Date    CAD (coronary artery disease)     [facility]     [facility]     Hyperlipidemia     Hypertension     S/P CABG x 3 2020    done urgently    S/P left atrial appendage ligation 2020     Social History:    Social History     Tobacco Use   Smoking Status Former Smoker    Packs/day: 1.00    Years: 26.00    Pack years: 26.00    Types: Cigarettes    Quit date: 3/16/1995    Years since quittin.1   Smokeless Tobacco Never Used     Current Medications:   Current Outpatient Medications   Medication Sig Dispense Refill    metoprolol tartrate (LOPRESSOR) 50 MG tablet Take 1 tablet by mouth 2 times daily 180 tablet 3    losartan (COZAAR) 50 MG tablet Take 1 tablet by mouth daily 90 tablet 3    empagliflozin (JARDIANCE) 25 MG tablet Take 25 mg by mouth daily 30 tablet 2    furosemide (LASIX) 20 MG tablet Take 1 tablet by mouth daily 90 tablet 3    tadalafil (CIALIS) 20 MG tablet Take 20 mg by mouth as needed for Erectile Dysfunction      atorvastatin (LIPITOR) 40 MG tablet Take 1 tablet by mouth daily 90 tablet 3    insulin glargine (LANTUS;BASAGLAR) 100 UNIT/ML injection pen Inject 30 Units into the skin nightly (Patient taking differently: Inject 45 Units into the skin nightly ) 5 pen 3    aspirin 325 MG EC tablet Take 1 tablet by mouth daily 30 tablet 3    gabapentin (NEURONTIN) 100 MG capsule Take 100 mg by mouth. Take 1 tablet at night for 1 week increase to 2 tablets if needed      Lancets MISC 1 each by Does not apply route 2 times daily 100 each 5    blood glucose monitor strips Test 3  times a day & as needed for symptoms of irregular blood glucose. 100 strip 0    Insulin Pen Needle 30G X 8 MM MISC 1 each by Does not apply route daily 100 each 3     No current facility-administered medications for this visit. REVIEW OF SYSTEMS:    CONSTITUTIONAL: No major weight gain or loss, fatigue, weakness, night sweats or fever. HEENT: No new vision difficulties or ringing in the ears. RESPIRATORY: No new SOB, PND, orthopnea or cough. CARDIOVASCULAR: See HPI  GI: No nausea, vomiting, diarrhea, constipation, abdominal pain or changes in bowel habits. : No urinary frequency, urgency, incontinence hematuria or dysuria. SKIN: No cyanosis or skin lesions. MUSCULOSKELETAL: No new muscle or joint pain. NEUROLOGICAL: No syncope or TIA-like symptoms. PSYCHIATRIC: No anxiety, pain, insomnia or depression    Objective:   PHYSICAL EXAM:       Vitals:    04/16/21 1147 04/16/21 1209   BP: 130/80 (!) 140/70   Pulse: 77    SpO2: 96%    Weight: 168 lb 6.4 oz (76.4 kg)    Height: 5' 7.5\" (1.715 m)         VITALS:  BP (!) 140/70   Pulse 77   Ht 5' 7.5\" (1.715 m)   Wt 168 lb 6.4 oz (76.4 kg)   SpO2 96%   BMI 25.99 kg/m²   CONSTITUTIONAL: Cooperative, no apparent distress, and appears well nourished / developed  NEUROLOGIC:  Awake and orientated to person, place and time. PSYCH: Calm affect. SKIN: Warm and dry.   HEENT: Sclera non-icteric, normocephalic, neck supple, no elevation of JVP, normal fraction was in the     range of 25% to 30%. Moderate diffuse hypokinesis with no     identifiable regional variations. Waves not well visualized.     Apical thrombus cannot be completely excluded based on this     study. Doppler parameters are consistent with abnormal left     ventricular relaxation (grade 1 diastolic dysfunction). The     longitudal strain study is abnormal.  2. Aortic valve: There was a small, 1.0cm (L) x 0.5cm (W), calcific     nodule noted on the aortic valve. An old, healed vegetation     cannot be completely excluded. Peak velocity (S): 0.96m/sec.     Mean gradient (S): 2mm Hg. VTI ratio of LVOT to aortic valve:     0.86. Valve area (VTI): 2.72cm^2. 3. Mitral valve: The annulus was mildly calcified. The leaflets     were mildly calcified. There was mild regurgitation. Mean     gradient (D): 2mm Hg. Valve area by continuity equation (using     LVOT flow): 1.7cm^2. 4. Left atrium: The atrium was moderately dilated. 5. Right ventricle: The cavity size was normal. Wall thickness was     normal. Systolic function was normal.  6. Pericardium, extracardiac: There was no pericardial effusion.     [facility] 3/12: Evaluation for CABG because of involvement of distal left main, 100% RCA occlusion as well as long occlusion from proximal to distal LAD and the entire mid LAD with severe tandem 95% lesions. If CABG is declined then recommend PCI once EDP is improved and heart failure has resolved and patient is clinically stable with first evaluation of distal left main lesion.  -Pending transfer for CT surgery evaluation. Coronary angiography:   1. LM is of large caliber and gives rise to the LAD and left circumflex coronary arteries. LM appears to have 40% lesion in the distal segment before the bifurcation which is eccentric. 2. LAD system is large caliber system and has mild to moderate disease in the proximal segment however the mid segment entirely is severely diseased with tandem 95% lesions.  Mid to distal segment has luminal irregularities and reaches the apex and wraps around it. 3. LCx system is large caliber system and has mild diffuse disease without any significant obstructive lesion. Gives rise to medium large caliber OM1 that has luminal irregularities. 4. RCA system is large caliber and dominant. There was mild disease in proximal to mid segment however distal segment is occluded with contrast staining in the distal segment suggestive of recent occlusion.         Last Echo: 3/14/20:  Summary   *Definity contrast administered.   *Left ventricle - normal in size and thickness, function is reduced with EF of 40%   *Wall motion - basal to mid septal hypokinesis.   *Aortic valve - sclerotic   *Mitral valve - mild regurgitation        Last Angiogram: 3/20/20:  Findings:               LM                        73% distal by IVUS  LAD                      90% prox, 90% mid 95% mid  CX                        OM1 - mid 50%, 50% ostial  RCA                      100% mid with staining, L-R collaterals                         LVG                      Not performed  LVEDP                 Not obtained     Intervention:         IVUS of LM and ostial Cx - LM - 73% stenosis, area 5.7        OR: 3/24/20:  TCPB,  Urgent CABG X3, EVH left saphenous vein, LIMA grafting X1, SVG x2,  (LIMA-LAD, SV-OM1, SV-PDA).   Ligation LEFT ATRIAL APPENDAGE - 45 atriclip     Echo: Sept '20:  SUMMARY:  1. Left ventricle: The cavity size was normal. Wall thickness was     normal. Systolic function was severly reduced. The estimated     ejection fraction was in the range of 30% to 35%. Mild diffuse     hypokinesis with regional variations. Unchanged from the study     of 03/14/2020. Doppler parameters are consistent with abnormal     left ventricular relaxation (grade 1 diastolic dysfunction). 2. Aortic valve: There was trivial regurgitation. 3. Right ventricle:  The cavity size was normal. Wall thickness was     normal. Systolic function was normal.    Assessment:      Diagnosis Orders   1. Coronary artery disease involving native coronary artery of native heart without angina pectoris   ~stable : offers no c/o discomfort  ~reports to be taking medications but cannot actually say what he takes  ~ s/p CABG March '20  ~ hx post-op AF and s/p JORGE ALBERTO clip. Remains on metoprolol ; off amiodarone  ~ASA / statin   ~exercise is work related        2. Ischemic cardiomyopathy   ~mild diffuse HK, EF 30-35% on echo from Sept '20  ~LVEF 25-30% with mod, diffuse HK on echo March '20  ~ basal to mid septal hypokinesis, EF 40% using Definity 3/14/20  ~neg for decompensation   ~BB / ARB    3. Essential hypertension   ~controlled - reasonable  ~will monitor   ~losartan 50 mg daily / metoprolol 50 mg bid    4. Mixed hyperlipidemia   ~LDL at goal ; trig near goal    ~last A1c 7.5%   ~atorvastatin 40 mg daily   ~diabetes managed by PCP    I had the opportunity to review the clinical symptoms and presentation of [employer]. Plan:     1. Continue present management   2. F/U in six months     Overall the patient is stable from CV standpoint    I have addresed the patient's cardiac risk factors and adjusted pharmacologic treatment as needed. In addition, I have reinforced the need for patient directed risk factor modification. Further evaluation will be based upon the patient's clinical course and testing results. All questions and concerns were addressed to the patient. Alternatives to my treatment were discussed. Ok to have an occasional hamburger/fries with awareness to stay on diet 95% of time in regards to low fat diet. Encouraged to follow his diabetic restrictions    The patient is not currently smoking: remote. The risks related to smoking were reviewed with the patient. Recommend maintaining a smoke-free lifestyle.      Patient is on a beta-blocker  Patient is on an ARB  Patient is on a statin    Antiplatelet therapy has been recommended / prescribed for this patient. Education conducted on adverse reactions including bleeding was discussed. Daily weight, low sodium diet were discussed. Patient instructed to call the office with a weight gain: > 3 # over night or 5# in one week; swelling, SOB/orthopnea/PND    The patient verbalizes understanding not to stop medications without discussing with us. Discussed exercise: 30-60 minutes 7 days/week : work related   Discussed Low saturated fat/SARAH diet. SMBG 125    Thank you for allowing to us to participate in the care of [employer].     VINI Gonsalves    Documentation of today's visit sent to PCP

## 2021-10-19 ENCOUNTER — OFFICE VISIT (OUTPATIENT)
Dept: CARDIOLOGY CLINIC | Age: 66
End: 2021-10-19
Payer: MEDICARE

## 2021-10-19 VITALS
OXYGEN SATURATION: 97 % | HEART RATE: 64 BPM | WEIGHT: 168 LBS | SYSTOLIC BLOOD PRESSURE: 140 MMHG | BODY MASS INDEX: 25.46 KG/M2 | DIASTOLIC BLOOD PRESSURE: 72 MMHG | HEIGHT: 68 IN

## 2021-10-19 DIAGNOSIS — I25.5 ISCHEMIC CARDIOMYOPATHY: ICD-10-CM

## 2021-10-19 DIAGNOSIS — I10 ESSENTIAL HYPERTENSION: ICD-10-CM

## 2021-10-19 DIAGNOSIS — I25.10 CORONARY ARTERY DISEASE INVOLVING NATIVE CORONARY ARTERY OF NATIVE HEART WITHOUT ANGINA PECTORIS: Primary | ICD-10-CM

## 2021-10-19 DIAGNOSIS — E78.2 MIXED HYPERLIPIDEMIA: ICD-10-CM

## 2021-10-19 PROCEDURE — 4040F PNEUMOC VAC/ADMIN/RCVD: CPT | Performed by: NURSE PRACTITIONER

## 2021-10-19 PROCEDURE — 93000 ELECTROCARDIOGRAM COMPLETE: CPT | Performed by: NURSE PRACTITIONER

## 2021-10-19 PROCEDURE — G8427 DOCREV CUR MEDS BY ELIG CLIN: HCPCS | Performed by: NURSE PRACTITIONER

## 2021-10-19 PROCEDURE — 3017F COLORECTAL CA SCREEN DOC REV: CPT | Performed by: NURSE PRACTITIONER

## 2021-10-19 PROCEDURE — 1036F TOBACCO NON-USER: CPT | Performed by: NURSE PRACTITIONER

## 2021-10-19 PROCEDURE — 1123F ACP DISCUSS/DSCN MKR DOCD: CPT | Performed by: NURSE PRACTITIONER

## 2021-10-19 PROCEDURE — 99214 OFFICE O/P EST MOD 30 MIN: CPT | Performed by: NURSE PRACTITIONER

## 2021-10-19 PROCEDURE — G8417 CALC BMI ABV UP PARAM F/U: HCPCS | Performed by: NURSE PRACTITIONER

## 2021-10-19 PROCEDURE — G8484 FLU IMMUNIZE NO ADMIN: HCPCS | Performed by: NURSE PRACTITIONER

## 2021-10-19 RX ORDER — M-VIT,TX,IRON,MINS/CALC/FOLIC 27MG-0.4MG
1 TABLET ORAL DAILY
COMMUNITY

## 2021-10-19 RX ORDER — MESALAMINE 0.38 G/1
1.5 CAPSULE, EXTENDED RELEASE ORAL DAILY
COMMUNITY

## 2021-10-19 RX ORDER — AMLODIPINE BESYLATE 5 MG/1
5 TABLET ORAL DAILY
Qty: 30 TABLET | Refills: 0 | Status: SHIPPED | OUTPATIENT
Start: 2021-10-19 | End: 2021-12-01

## 2021-10-19 RX ORDER — AMLODIPINE BESYLATE 5 MG/1
5 TABLET ORAL DAILY
Qty: 90 TABLET | Refills: 3 | Status: SHIPPED | OUTPATIENT
Start: 2021-10-19 | End: 2021-10-19 | Stop reason: SDUPTHER

## 2021-10-19 RX ORDER — LOSARTAN POTASSIUM 100 MG/1
50 TABLET ORAL DAILY
COMMUNITY
End: 2021-10-19 | Stop reason: ALTCHOICE

## 2021-10-19 RX ORDER — VALSARTAN 320 MG/1
320 TABLET ORAL DAILY
Qty: 90 TABLET | Refills: 1 | Status: SHIPPED | OUTPATIENT
Start: 2021-10-19 | End: 2021-10-19 | Stop reason: SDUPTHER

## 2021-10-19 RX ORDER — ROSUVASTATIN CALCIUM 40 MG/1
40 TABLET, COATED ORAL EVERY EVENING
Qty: 90 TABLET | Refills: 3 | Status: SHIPPED | OUTPATIENT
Start: 2021-10-19

## 2021-10-19 RX ORDER — ROSUVASTATIN CALCIUM 40 MG/1
20 TABLET, COATED ORAL EVERY EVENING
COMMUNITY
End: 2021-10-19 | Stop reason: SDUPTHER

## 2021-10-19 RX ORDER — ASPIRIN 81 MG/1
81 TABLET ORAL DAILY
COMMUNITY

## 2021-10-19 RX ORDER — VALSARTAN 320 MG/1
320 TABLET ORAL DAILY
Qty: 30 TABLET | Refills: 0 | Status: SHIPPED | OUTPATIENT
Start: 2021-10-19

## 2021-10-19 NOTE — PROGRESS NOTES
[facility]     Outpatient Follow Up Note    Maci Wyman is 73 y.o. male who presents today with a history of IWMI / CAD s/p CABG with JORGE ALBERTO clip  and post-op AF; ischemic CM/EF 25-30%, CHF (pre-op) and HTN. ~please see note for detailed presentation on 3/11/20  ~episode of decompensation  when ran out of medicines    CHIEF COMPLAINT / HPI:  Follow Up secondary to CAD    Subjective:   His RUE as been hurting the past couple of weeks. He doesn't know what caused it but he had been lifting at work ( > 100# with another person). Its been constant and gets worse when laying now at night. He has been taking advil which helps a little. He recalls his Lt arm hurting before having his heart attack. He cannot say that he's had recurrent symptoms. He's had no chest discomfort. He has no SOB. The patient denies orthopnea/PND. The patient does not have swelling. The patients weight is stable ~ 160#. The patient is not experiencing palpitations or dizziness. These symptoms are new since the last OV. With regard to medication therapy the patient has been compliant with prescribed regimen. They have tolerated therapy to date.      Past Medical History:   Diagnosis Date    CAD (coronary artery disease)     [facility]est[facility] failure[facility])     [facility] mellitus ([facility]     Hyperlipidemia     Hypertension     S/P CABG x 3 2020    done urgently    S/P left atrial appendage ligation 2020     Social History:    Social History     Tobacco Use   Smoking Status Former Smoker    Packs/day: 1.00    Years: 26.00    Pack years: 26.00    Types: Cigarettes    Quit date: 3/16/1995    Years since quittin.6   Smokeless Tobacco Never Used     Current Medications: he's been taking both losartan and crestor as 1 tablet daily when his prescription reads to take 1/2 tablet daily  Current Outpatient Medications   Medication Sig Dispense Refill    Multiple Vitamins-Minerals (THERAPEUTIC MULTIVITAMIN-MINERALS) tablet Take 1 tablet by mouth daily      mesalamine (APRISO) 0.375 g extended release capsule Take 1.5 g by mouth daily 4 capsules qam      aspirin 81 MG EC tablet Take 81 mg by mouth daily      losartan (COZAAR) 100 MG tablet Take 50 mg by mouth daily      metoprolol tartrate (LOPRESSOR) 50 MG tablet Take 1 tablet by mouth 2 times daily 180 tablet 3    empagliflozin (JARDIANCE) 25 MG tablet Take 25 mg by mouth daily 30 tablet 2    furosemide (LASIX) 20 MG tablet Take 1 tablet by mouth daily 90 tablet 3    insulin glargine (LANTUS;BASAGLAR) 100 UNIT/ML injection pen Inject 30 Units into the skin nightly (Patient taking differently: Inject 45 Units into the skin nightly ) 5 pen 3    tadalafil (CIALIS) 20 MG tablet Take 20 mg by mouth as needed for Erectile Dysfunction      Lancets MISC 1 each by Does not apply route 2 times daily 100 each 5    blood glucose monitor strips Test 3  times a day & as needed for symptoms of irregular blood glucose. 100 strip 0    Insulin Pen Needle 30G X 8 MM MISC 1 each by Does not apply route daily 100 each 3     No current facility-administered medications for this visit. REVIEW OF SYSTEMS:    CONSTITUTIONAL: No major weight gain or loss, fatigue, weakness, night sweats or fever. HEENT: No new vision difficulties or ringing in the ears. RESPIRATORY: No new SOB, PND, orthopnea or cough. CARDIOVASCULAR: See HPI  GI: No nausea, vomiting, diarrhea, constipation, abdominal pain or changes in bowel habits. : No urinary frequency, urgency, incontinence hematuria or dysuria. SKIN: No cyanosis or skin lesions. MUSCULOSKELETAL: No new muscle or joint pain: RUE pain. NEUROLOGICAL: No syncope or TIA-like symptoms.   PSYCHIATRIC: No anxiety, pain, insomnia or depression    Objective:   PHYSICAL EXAM:       Vitals:    10/19/21 0908 10/19/21 0936   BP: (!) 150/80 (!) 140/72   Site: Right Upper Arm Right Upper Arm   Position: Sitting    Cuff Size: Medium Adult Medium Adult   Pulse: 64    SpO2: 97%    Weight: 168 lb (76.2 kg)    Height: 5' 7.5\" (1.715 m)         VITALS:  BP (!) 150/80 (Site: Right Upper Arm, Position: Sitting, Cuff Size: Medium Adult)   Pulse 64   Ht 5' 7.5\" (1.715 m)   Wt 168 lb (76.2 kg)   SpO2 97%   BMI 25.92 kg/m²   CONSTITUTIONAL: Cooperative, no apparent distress, and appears well nourished / developed  NEUROLOGIC:  Awake and orientated to person, place and time. PSYCH: Calm affect. SKIN: Warm and dry. HEENT: Sclera non-icteric, normocephalic, neck supple, no elevation of JVP, normal carotid pulses with no bruits and thyroid normal size. LUNGS:  No increased work of breathing and clear to auscultation, no crackles or wheezing  CARDIOVASCULAR:  Regular rate 60 and rhythm with no murmurs, gallops, rubs, or abnormal heart sounds, normal PMI. The apical impulses not displaced  JVP less than 8 cm H2O  Heart tones are crisp and normal  Cervical veins are not engorged  The carotid upstroke is normal in amplitude and contour without delay or bruit  JVP is not elevated  ABDOMEN:  Normal bowel sounds, non-distended and non-tender to palpation  EXT: No edema, no calf tenderness. Pulses are present bilaterally.     DATA:    Lab Results   Component Value Date    ALT 53 (H) 03/17/2020    AST 47 (H) 03/17/2020    ALKPHOS 76 03/17/2020    BILITOT <0.2 03/17/2020     Lab Results   Component Value Date    CREATININE 0.7 (L) 03/27/2020    BUN 24 (H) 03/27/2020     03/27/2020    K 4.1 03/27/2020     03/27/2020    CO2 27 03/27/2020     Lab Results   Component Value Date    TSH 4.47 (H) 03/27/2020     No components found for: CHLPL  Lab Results   Component Value Date    TRIG 199 (H) 03/17/2020     LABS: 12/21/20:     trig 193 HDL 46 LDL 70   Na+ 143 K+ 4.1 chl 104 CO2 22 BUN 20 creatinine 1.10 glu 176   A1c 7.5%    Radiology Review:  Pertinent images / reports were reviewed as a part of this visit and reveals the following:    Echo: 3/11/20:  SUMMARY:  1. Left ventricle: The cavity size was mildly to moderately     dilated. Wall thickness was normal. Systolic function was     severly reduced. The estimated ejection fraction was in the     range of 25% to 30%. Moderate diffuse hypokinesis with no     identifiable regional variations. [place] not well visualized.     Apical thrombus cannot be completely excluded based on this     study. Doppler parameters are consistent with abnormal left     ventricular relaxation (grade 1 diastolic dysfunction). The     longitudal strain study is abnormal.  2. Aortic valve: There was a small, 1.0cm (L) x 0.5cm (W), calcific     nodule noted on the aortic valve. An old, healed vegetation     cannot be completely excluded. Peak velocity (S): 0.96m/sec.     Mean gradient (S): 2mm Hg. VTI ratio of LVOT to aortic valve:     0.86. Valve area (VTI): 2.72cm^2. 3. Mitral valve: The annulus was mildly calcified. The leaflets     were mildly calcified. There was mild regurgitation. Mean     gradient (D): 2mm Hg. Valve area by continuity equation (using     LVOT flow): 1.7cm^2. 4. Left atrium: The atrium was moderately dilated. 5. Right ventricle: The cavity size was normal. Wall thickness was     normal. Systolic function was normal.  6. Pericardium, extracardiac: There was no pericardial effusion.     [facility] 3/12: Evaluation for CABG because of involvement of distal left main, 100% RCA occlusion as well as long occlusion from proximal to distal LAD and the entire mid LAD with severe tandem 95% lesions. If CABG is declined then recommend PCI once EDP is improved and heart failure has resolved and patient is clinically stable with first evaluation of distal left main lesion.  -Pending transfer for CT surgery evaluation. Coronary angiography:   1. LM is of large caliber and gives rise to the LAD and left circumflex coronary arteries.  LM appears to have 40% lesion in the distal segment before the bifurcation which is eccentric. 2. LAD system is large caliber system and has mild to moderate disease in the proximal segment however the mid segment entirely is severely diseased with tandem 95% lesions. Mid to distal segment has luminal irregularities and reaches the apex and wraps around it. 3. LCx system is large caliber system and has mild diffuse disease without any significant obstructive lesion. Gives rise to medium large caliber OM1 that has luminal irregularities. 4. RCA system is large caliber and dominant. There was mild disease in proximal to mid segment however distal segment is occluded with contrast staining in the distal segment suggestive of recent occlusion.         Last Echo: 3/14/20:  Summary   *Definity contrast administered.   *Left ventricle - normal in size and thickness, function is reduced with EF of 40%   *Wall motion - basal to mid septal hypokinesis.   *Aortic valve - sclerotic   *Mitral valve - mild regurgitation        Last Angiogram: 3/20/20:  Findings:               LM                        73% distal by IVUS  LAD                      90% prox, 90% mid 95% mid  CX                        OM1 - mid 50%, 50% ostial  RCA                      100% mid with staining, L-R collaterals                         LVG                      Not performed  LVEDP                 Not obtained     Intervention:         IVUS of LM and ostial Cx - LM - 73% stenosis, area 5.7        OR: 3/24/20:  TCPB,  Urgent CABG X3, EVH left saphenous vein, LIMA grafting X1, SVG x2,  (LIMA-LAD, SV-OM1, SV-PDA).   Ligation LEFT ATRIAL APPENDAGE - 45 atriclip     Echo: Sept '20:  SUMMARY:  1. Left ventricle: The cavity size was normal. Wall thickness was     normal. Systolic function was severly reduced. The estimated     ejection fraction was in the range of 30% to 35%. Mild diffuse     hypokinesis with regional variations. Unchanged from the study     of 03/14/2020.  Doppler parameters are consistent with abnormal     left ventricular relaxation (grade 1 diastolic dysfunction). 2. Aortic valve: There was trivial regurgitation. 3. Right ventricle: The cavity size was normal. Wall thickness was     normal. Systolic function was normal.    Assessment:      Diagnosis Orders   1. Coronary artery disease involving native coronary artery of native heart without angina pectoris   ~c/o RUE discomfort not reproducible to palpation; poss MSCK d/t heavy lifting vs angina equivalent  ~ s/p CABG March '20  ~ hx post-op AF and s/p JORGE ALBERTO clip. on metoprolol   ~ASA / statin   ~exercise is work related         2. Ischemic cardiomyopathy   ~stable : neg for decompensation   ~mild diffuse HK, EF 30-35% on echo from Sept '20  ~LVEF 25-30% with mod, diffuse HK on echo March '20  ~ basal to mid septal hypokinesis, EF 40% using Definity 3/14/20  ~BB / ARB    3. Essential hypertension   ~suboptimal given LVF  ~has inadvertently been taking losartan 100 mg daily (prescription reads 1/2 tablet daily)  ~metoprolol 50 mg bid    4. Mixed hyperlipidemia   ~atorvastatin changed to rosuvastatin 20 mg daily (taking 40 mg daily; prescription reads 1/2 tablet daily)   ~LDL at goal ; trig near goal    ~last A1c 7.5% Dec '20 : diabetes managed by PCP    I had the opportunity to review the clinical symptoms and presentation of Kinza Quiroz. Plan:     1. EKG : sinus rhythm 60-64; comparable to previous EKG  2. Stop losartan and begin valsartan 320 mg daily   Begin amlodipine 5 mg daily for BP control  3. Continue crestor at 40 mg daily    CMP/lipid profile as routine  4. Exercise myoview with poss angina equivalent in a DM / male  5. F/U 2 months / NM dependent    Overall the patient is stable from CV standpoint    I have addresed the patient's cardiac risk factors and adjusted pharmacologic treatment as needed. In addition, I have reinforced the need for patient directed risk factor modification.     Further evaluation will be based upon the patient's clinical course and testing results. All questions and concerns were addressed to the patient. Alternatives to my treatment were discussed. The patient is not currently smoking: remote. The risks related to smoking were reviewed with the patient. Recommend maintaining a smoke-free lifestyle. Patient is on a beta-blocker  Patient is on an ARB  Patient is on a statin    Antiplatelet therapy has been recommended / prescribed for this patient. Education conducted on adverse reactions including bleeding was discussed. Daily weight, low sodium diet were discussed. Patient instructed to call the office with a weight gain: > 3 # over night or 5# in one week; swelling, SOB/orthopnea/PND    The patient verbalizes understanding not to stop medications without discussing with us. Discussed exercise: 30-60 minutes 7 days/week : work related (walks all the time; works at a  home) . He hasn't been able to do his sit ups or push ups since having his surgery in   Discussed Low saturated fat/SARAH diet. Hasn't been routinely checking his BS    Thank you for allowing to us to participate in the care of [employer].     VINI Aparicio    Documentation of today's visit sent to PCP

## 2021-10-19 NOTE — PATIENT INSTRUCTIONS
Nuclear stress test : looks at your heart's circulation     Start taking valsartan 320 mg once a day in place of losartan    Continue to take crestor / rosuvastatin 40 mg daily     Begin amlodipine / norvasc 5 mg daily for better BP control    appt in 2-3 months as long as your stress test looks ok

## 2021-11-10 ENCOUNTER — HOSPITAL ENCOUNTER (OUTPATIENT)
Dept: NON INVASIVE DIAGNOSTICS | Age: 66
Discharge: HOME | End: 2021-11-10
Payer: MEDICARE

## 2021-11-10 LAB
LV EF: 50 %
LVEF MODALITY: NORMAL

## 2021-11-10 PROCEDURE — 78452 HT MUSCLE IMAGE SPECT MULT: CPT

## 2021-11-10 PROCEDURE — A9502 TC99M TETROFOSMIN: HCPCS | Performed by: NURSE PRACTITIONER

## 2021-11-10 PROCEDURE — 93017 CV STRESS TEST TRACING ONLY: CPT | Performed by: INTERNAL MEDICINE

## 2021-11-10 PROCEDURE — 6360000002 HC RX W HCPCS: Performed by: INTERNAL MEDICINE

## 2021-11-10 PROCEDURE — 3430000000 HC RX DIAGNOSTIC RADIOPHARMACEUTICAL: Performed by: NURSE PRACTITIONER

## 2021-11-10 RX ADMIN — REGADENOSON 0.4 MG: 0.08 INJECTION, SOLUTION INTRAVENOUS at 09:32

## 2021-11-10 RX ADMIN — TETROFOSMIN 10 MILLICURIE: 1.38 INJECTION, POWDER, LYOPHILIZED, FOR SOLUTION INTRAVENOUS at 07:35

## 2021-11-10 RX ADMIN — TETROFOSMIN 30 MILLICURIE: 1.38 INJECTION, POWDER, LYOPHILIZED, FOR SOLUTION INTRAVENOUS at 09:32

## 2021-11-10 NOTE — PROGRESS NOTES
Patient instructed on Saul Protocol Stress Test Procedure including possible side effects and adverse reactions. Verbalizes knowledge and understanding and denies having any questions.
Patient unable to reach target heart on treadmill with Saul Protocol and unable to go any further or any faster per Patient. See new order. Instructed on Lexiscan Stress Test Procedure including possible side effects/ adverse reactions. Patient verbalizes  understanding and denies having any questions . See [address] [place] [address] Cardiology
- - -
normal...

## 2022-03-24 ENCOUNTER — OFFICE VISIT (OUTPATIENT)
Dept: ORTHOPEDIC SURGERY | Age: 67
End: 2022-03-24
Payer: MEDICARE

## 2022-03-24 VITALS — WEIGHT: 168 LBS | BODY MASS INDEX: 25.46 KG/M2 | HEIGHT: 68 IN

## 2022-03-24 DIAGNOSIS — M25.511 RIGHT SHOULDER PAIN, UNSPECIFIED CHRONICITY: Primary | ICD-10-CM

## 2022-03-24 PROCEDURE — G8427 DOCREV CUR MEDS BY ELIG CLIN: HCPCS | Performed by: ORTHOPAEDIC SURGERY

## 2022-03-24 PROCEDURE — 1123F ACP DISCUSS/DSCN MKR DOCD: CPT | Performed by: ORTHOPAEDIC SURGERY

## 2022-03-24 PROCEDURE — 99204 OFFICE O/P NEW MOD 45 MIN: CPT | Performed by: ORTHOPAEDIC SURGERY

## 2022-03-24 PROCEDURE — 20610 DRAIN/INJ JOINT/BURSA W/O US: CPT | Performed by: ORTHOPAEDIC SURGERY

## 2022-03-24 PROCEDURE — 1036F TOBACCO NON-USER: CPT | Performed by: ORTHOPAEDIC SURGERY

## 2022-03-24 PROCEDURE — 3017F COLORECTAL CA SCREEN DOC REV: CPT | Performed by: ORTHOPAEDIC SURGERY

## 2022-03-24 PROCEDURE — 4040F PNEUMOC VAC/ADMIN/RCVD: CPT | Performed by: ORTHOPAEDIC SURGERY

## 2022-03-24 PROCEDURE — G8484 FLU IMMUNIZE NO ADMIN: HCPCS | Performed by: ORTHOPAEDIC SURGERY

## 2022-03-24 PROCEDURE — G8417 CALC BMI ABV UP PARAM F/U: HCPCS | Performed by: ORTHOPAEDIC SURGERY

## 2022-03-24 RX ORDER — BUPIVACAINE HYDROCHLORIDE 5 MG/ML
4 INJECTION, SOLUTION PERINEURAL ONCE
Status: COMPLETED | OUTPATIENT
Start: 2022-03-24 | End: 2022-03-24

## 2022-03-24 RX ORDER — METHYLPREDNISOLONE ACETATE 40 MG/ML
40 INJECTION, SUSPENSION INTRA-ARTICULAR; INTRALESIONAL; INTRAMUSCULAR; SOFT TISSUE ONCE
Status: COMPLETED | OUTPATIENT
Start: 2022-03-24 | End: 2022-03-24

## 2022-03-24 RX ADMIN — BUPIVACAINE HYDROCHLORIDE 20 MG: 5 INJECTION, SOLUTION PERINEURAL at 09:45

## 2022-03-24 RX ADMIN — METHYLPREDNISOLONE ACETATE 40 MG: 40 INJECTION, SUSPENSION INTRA-ARTICULAR; INTRALESIONAL; INTRAMUSCULAR; SOFT TISSUE at 09:45

## 2022-03-30 NOTE — PROGRESS NOTES
3/24/2022     Reason for visit:  Right shoulder pain    History of Present Illness: The patient is a 44-year-old male who presents for evaluation of his right shoulder. He reports that he injured himself 4 to 6 weeks ago. 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 Ever since then he has had persistent pain within the shoulder. The pain is diffuse. He has difficulty with overhead activities, reaching, and lifting. No numbness or tingling. Medical History:  Past Medical History:   Diagnosis Date    CAD (coronary artery disease)     [facility]     Diabetes mellitus [facility]     Hyperlipidemia     Hypertension     S/P CABG x 3 2020    done urgently    S/P left atrial appendage ligation 2020      Past Surgical History:   Procedure Laterality Date    CARDIAC SURGERY      angiogram 3/2020    COLONOSCOPY      CORONARY ARTERY BYPASS GRAFT N/A 3/24/2020    Dr. Thien Rogers - urgent x3 (LIMA-LAD, L SVG-OM, SVG-PDA); JORGE ALBERTO ligation w/45mm AtriClip      Family History   Problem Relation Age of Onset    Cancer Mother       Social History     Socioeconomic History    Marital status:      Spouse name: Kaitlynn Martins Number of children: 2    Years of education: high school    Highest education level: Not on file   Occupational History    Occupation:  home   Tobacco Use    Smoking status: Former Smoker     Packs/day: 1.00     Years: 26.00     Pack years: 26.00     Types: Cigarettes     Quit date: 3/16/1995     Years since quittin.0    Smokeless tobacco: Never Used   Vaping Use    Vaping Use: Never used   Substance and Sexual Activity    Alcohol use:  Yes     Alcohol/week: 4.0 - 12.0 standard drinks     Types: 4 - 12 Cans of beer per week     Comment: occasionally    Drug use: Never    Sexual activity: Yes     Partners: Female   Other Topics Concern    Not on file   Social History Narrative    Not on file Social Determinants of Health     Financial Resource Strain:     Difficulty of Paying Living Expenses: Not on file   Food Insecurity:     Worried About Running Out of Food in the Last Year: Not on file    Stacey of Food in the Last Year: Not on file   Transportation Needs:     Lack of Transportation (Medical): Not on file    Lack of Transportation (Non-Medical):  Not on file   Physical Activity:     Days of Exercise per Week: Not on file    Minutes of Exercise per Session: Not on file   Stress:     Feeling of Stress : Not on file   Social Connections:     Frequency of Communication with Friends and Family: Not on file    Frequency of Social Gatherings with Friends and Family: Not on file    Attends [employer] Services: Not on file    Active Member of [address] [place] [address] [employer] or Organizations: Not on file    Attends Club or Organization Meetings: Not on file    Marital Status: Not on file   Intimate Partner Violence:     Fear of Current or Ex-Partner: Not on file    Emotionally Abused: Not on file    Physically Abused: Not on file    Sexually Abused: Not on file   Housing Stability:     Unable to Pay for Housing in the Last Year: Not on file    Number of [place] in the Last Year: Not on file    Unstable Housing in the Last Year: Not on file      Current Outpatient Medications on File Prior to Visit   Medication Sig Dispense Refill    amLODIPine (NORVASC) 5 MG tablet Take 1 tablet by mouth once daily 90 tablet 1    Multiple Vitamins-Minerals (THERAPEUTIC MULTIVITAMIN-MINERALS) tablet Take 1 tablet by mouth daily      mesalamine (APRISO) 0.375 g extended release capsule Take 1.5 g by mouth daily 4 capsules qam      aspirin 81 MG EC tablet Take 81 mg by mouth daily      rosuvastatin (CRESTOR) 40 MG tablet Take 1 tablet by mouth every evening 90 tablet 3    valsartan (DIOVAN) 320 MG tablet Take 1 tablet by mouth daily 30 tablet 0    metoprolol tartrate (LOPRESSOR) 50 MG tablet Take 1 tablet by mouth 2 times daily 180 tablet 3    empagliflozin (JARDIANCE) 25 MG tablet Take 25 mg by mouth daily 30 tablet 2    furosemide (LASIX) 20 MG tablet Take 1 tablet by mouth daily 90 tablet 3    tadalafil (CIALIS) 20 MG tablet Take 20 mg by mouth as needed for Erectile Dysfunction      insulin glargine (LANTUS;BASAGLAR) 100 UNIT/ML injection pen Inject 30 Units into the skin nightly (Patient taking differently: Inject 45 Units into the skin nightly ) 5 pen 3    Lancets MISC 1 each by Does not apply route 2 times daily 100 each 5    blood glucose monitor strips Test 3  times a day & as needed for symptoms of irregular blood glucose. 100 strip 0    Insulin Pen Needle 30G X 8 MM MISC 1 each by Does not apply route daily 100 each 3     No current facility-administered medications on file prior to visit. Allergies   Allergen Reactions    Lipitor [Atorvastatin] Other (See Comments)     myalgias    Lisinopril Other (See Comments)     Cough    Metformin     Statins         Review of Systems:  Constitutional: Patient is adequately groomed with no evidence of malnutrition  Mental Status: The patient is oriented to time, place and person. The patient's mood and affect are appropriate. Lymphatic: The lymphatic examination bilaterally reveals all areas to be without enlargement or induration. Vascular: Examination reveals no swelling or calf tenderness. Peripheral pulses are palpable and 2+. Neurological: The patient has good coordination. There is no weakness or sensory deficit. Skin:  Head/Neck: inspection reveals no rashes, ulcerations or lesions. Trunk: inspection reveals no rashes, ulcerations or lesions.     Objective:  Ht 5' 7.5\" (1.715 m)   Wt 168 lb (76.2 kg)   BMI 25.92 kg/m²      Physical Exam:  The patient is well-appearing and in no apparent distress  Neg Spurling's test  Examination of the right shoulder  There is no swelling, ecchymosis, or gross deformity  There is no evidence of muscle atrophy  + Das test, neg Neers test  neg bicipital groove tenderness, neg AC joint tenderness  Range of motion reveals 140 degrees of forward flexion, 130 degrees of abduction, 45 degrees of external rotation, internal rotation to lumbar spine  4+/5 strength with resisted abduction, 4+/5 strength with resisted external rotation, 5/5 strength with resisted internal rotation  Intact motor and sensory function throughout the median/radial/ulnar/PIN/AIN distributions  Palpable radial pulse, brisk cap refill, 2+ symmetric reflexes    Imagin view x-rays of the right shoulder obtained the office today on 3/24/2022 were reviewed. There is no fracture or dislocation. Glenohumeral degenerative changes are present with loss of joint space and humeral head osteophyte. Assessment:  Right glenohumeral degenerative joint disease    Plan:  I discussed with the patient the diagnosis and treatment options. We discussed operative and nonoperative management. At this point I do recommend nonoperative management. Nonoperative treatment options include activity modification, anti-inflammatory medications, physical therapy, and injections. The patient elected proceed with cortisone injection. Therefore injection was given to the right glenohumeral joint via sterile technique. The injection consisted of 40 mg of Depo-Medrol combined with 4 mL of 0.5% Marcaine. The patient tolerated this well. He will return as needed. If he does not experience relief from this injection he will call us and I will get him set up with an ultrasound-guided glenohumeral injection with one of my partners in the office. Greater than 45 minutes were spent with this encounter. Time spent included evaluating the patient's chart prior to arrival.  Evaluating the patient in the office including history, physical examination, imaging reviewing, and counseling on next steps.   Lastly, time was spent discussing orders with my staff as well as providing

## 2022-04-19 ENCOUNTER — TELEPHONE (OUTPATIENT)
Dept: ORTHOPEDIC SURGERY | Age: 67
End: 2022-04-19

## 2022-04-19 DIAGNOSIS — M25.511 RIGHT SHOULDER PAIN, UNSPECIFIED CHRONICITY: Primary | ICD-10-CM

## 2022-04-29 ENCOUNTER — TELEPHONE (OUTPATIENT)
Dept: ORTHOPEDIC SURGERY | Age: 67
End: 2022-04-29

## 2022-04-29 DIAGNOSIS — M25.511 RIGHT SHOULDER PAIN, UNSPECIFIED CHRONICITY: Primary | ICD-10-CM

## 2022-04-29 NOTE — TELEPHONE ENCOUNTER
General Question     Subject: PATIENT WOULD LIKE TO KNOW IF MEDICARE WILL PAY FOR THE ULTRASOUND GUIDED INJECTION. PLEASE ADVISE.     Patient:  Benjy Biswas  Contact Number: [phone]

## 2022-04-29 NOTE — TELEPHONE ENCOUNTER
Spoke with PT letting him know that he will need to contact his insurance company to find out if his insurance will cover the injection.  Pt understood

## 2022-04-29 NOTE — TELEPHONE ENCOUNTER
Spoke with Pt about ;s last note stating that the next step would be an ultrasound guided injection with Doris.  Pt understood and referral was put in

## 2022-05-09 ENCOUNTER — OFFICE VISIT (OUTPATIENT)
Dept: ORTHOPEDIC SURGERY | Age: 67
End: 2022-05-09
Payer: MEDICARE

## 2022-05-09 VITALS — HEIGHT: 68 IN | BODY MASS INDEX: 25.46 KG/M2 | WEIGHT: 167.99 LBS

## 2022-05-09 DIAGNOSIS — M19.011 OSTEOARTHRITIS OF GLENOHUMERAL JOINT, RIGHT: Primary | ICD-10-CM

## 2022-05-09 DIAGNOSIS — M25.511 RIGHT SHOULDER PAIN, UNSPECIFIED CHRONICITY: ICD-10-CM

## 2022-05-09 PROCEDURE — 4040F PNEUMOC VAC/ADMIN/RCVD: CPT | Performed by: INTERNAL MEDICINE

## 2022-05-09 PROCEDURE — 1036F TOBACCO NON-USER: CPT | Performed by: INTERNAL MEDICINE

## 2022-05-09 PROCEDURE — G8417 CALC BMI ABV UP PARAM F/U: HCPCS | Performed by: INTERNAL MEDICINE

## 2022-05-09 PROCEDURE — 20611 DRAIN/INJ JOINT/BURSA W/US: CPT | Performed by: INTERNAL MEDICINE

## 2022-05-09 PROCEDURE — 1123F ACP DISCUSS/DSCN MKR DOCD: CPT | Performed by: INTERNAL MEDICINE

## 2022-05-09 PROCEDURE — 99202 OFFICE O/P NEW SF 15 MIN: CPT | Performed by: INTERNAL MEDICINE

## 2022-05-09 PROCEDURE — G8427 DOCREV CUR MEDS BY ELIG CLIN: HCPCS | Performed by: INTERNAL MEDICINE

## 2022-05-09 PROCEDURE — 3017F COLORECTAL CA SCREEN DOC REV: CPT | Performed by: INTERNAL MEDICINE

## 2022-05-09 RX ORDER — METHYLPREDNISOLONE ACETATE 40 MG/ML
40 INJECTION, SUSPENSION INTRA-ARTICULAR; INTRALESIONAL; INTRAMUSCULAR; SOFT TISSUE ONCE
Status: COMPLETED | OUTPATIENT
Start: 2022-05-09 | End: 2022-05-09

## 2022-05-09 RX ORDER — LIDOCAINE HYDROCHLORIDE 10 MG/ML
5 INJECTION, SOLUTION INFILTRATION; PERINEURAL ONCE
Status: COMPLETED | OUTPATIENT
Start: 2022-05-09 | End: 2022-05-09

## 2022-05-09 RX ORDER — BUPIVACAINE HYDROCHLORIDE 2.5 MG/ML
3 INJECTION, SOLUTION INFILTRATION; PERINEURAL ONCE
Status: COMPLETED | OUTPATIENT
Start: 2022-05-09 | End: 2022-05-09

## 2022-05-09 RX ADMIN — BUPIVACAINE HYDROCHLORIDE 7.5 MG: 2.5 INJECTION, SOLUTION INFILTRATION; PERINEURAL at 17:09

## 2022-05-09 RX ADMIN — LIDOCAINE HYDROCHLORIDE 5 ML: 10 INJECTION, SOLUTION INFILTRATION; PERINEURAL at 17:09

## 2022-05-09 RX ADMIN — METHYLPREDNISOLONE ACETATE 40 MG: 40 INJECTION, SUSPENSION INTRA-ARTICULAR; INTRALESIONAL; INTRAMUSCULAR; SOFT TISSUE at 17:09

## 2022-05-09 NOTE — LETTER
[address]  [address] [facility] [address]  Phone: [phone]  Fax: [phone]           Mansi Rivero MD      May 10, 2022     Patient: Rip Ledezma   MR Number: [id number]   YOB: 1955   Date of Visit: 5/9/2022       Dear Dr. Donna Dawkins:    Thank you for referring Emma Stroud to me for evaluation/treatment. Below are the relevant portions of my assessment and plan of care. Impression: . Encounter Diagnoses   Name Primary?  Osteoarthritis of glenohumeral joint, right     Right shoulder pain, unspecified chronicity               Plan:     Postinjection protocol  Clinical follow-up with Dr. Baudilio Faustin as scheduled      The nature of the finding, probable diagnosis and likely treatment was thoroughly discussed with the patient. The options, risks, complications, alternative treatment as well as some of the differential diagnosis was discussed. The patient was thoroughly informed and all questions were answered. the patient indicated understanding and satisfaction with the discussion. Orders:        Orders Placed This Encounter   Procedures    US GUIDED NEEDLE PLACEMENT     Standing Status:   Future     Number of Occurrences:   1     Standing Expiration Date:   5/9/2023     Order Specific Question:   Reason for exam:     Answer:   GHJ CSI    MA ARTHROCENTESIS ASPIR&/INJ MAJOR JT/BURSA W/O US           Disclaimer: \"This note was dictated with voice recognition software. Though review and correction are routine, we apologize for any errors. \"           If you have questions, please do not hesitate to call me. I look forward to following Yola Watt along with you.     Sincerely,        Mansi Rivero MD    CC providers:  Baudilio Faustin MD  [address] [place] [address]  [address]

## 2022-05-09 NOTE — PROGRESS NOTES
Chief Complaint:   Chief Complaint   Patient presents with    Shoulder Pain     right, initially injured lifting casket at work a couple months ago, had CSI with Dr. Sergio Leslie, lasted only 1-2 days, ref for US guided inj today          History of Present Illness:       Patient is a 78 y.o. male presents with the above complaint. He is seen in consultation at the request of Dr. Saint Mulligan for ultrasound-guided intra-articular steroid injection right glenohumeral joint working diagnosis of glenohumeral joint osteoarthritis. The symptoms began 6 weeksago and started with an injury. The patient describes a sharp, aching pain that does not radiate. The symptoms are constant  and are show no change since the onset. The symptoms do not show a typical rotator cuff provacative pattern. The pain is diffuse about the shoulder. There are not associated mechanical symptoms localizing to the shoulder. The patient denies symptoms of instability about the shoulder. Treatment to date has included corticosteroid injection which was Partially effective for 48 hours. Pain levels 6. The symptoms do not correlate with head and neck movement. The patient denies new onset weakness of the upper extremity. The patient does not have history of prior shoulder trauma. There is no history or autoimmune disease, inflammatory arthropathy or crystal arthropathy.            Past Medical History:        Past Medical History:   Diagnosis Date    CAD (coronary artery disease)     [facility]     Diabetes mellitus ([facility]     Hyperlipidemia     Hypertension     S/P CABG x 3 03/24/2020    done urgently    S/P left atrial appendage ligation 03/2020         Past Surgical History:   Procedure Laterality Date    CARDIAC SURGERY      angiogram 3/2020    COLONOSCOPY      CORONARY ARTERY BYPASS GRAFT N/A 3/24/2020    Dr. Nina Dumont - urgent x3 (LIMA-LAD, L SVG-OM, SVG-PDA); JORGE ALBERTO ligation w/45mm AtriClip Present Medications:         Current Outpatient Medications   Medication Sig Dispense Refill    amLODIPine (NORVASC) 5 MG tablet Take 1 tablet by mouth once daily 90 tablet 1    Multiple Vitamins-Minerals (THERAPEUTIC MULTIVITAMIN-MINERALS) tablet Take 1 tablet by mouth daily      mesalamine (APRISO) 0.375 g extended release capsule Take 1.5 g by mouth daily 4 capsules qam      aspirin 81 MG EC tablet Take 81 mg by mouth daily      rosuvastatin (CRESTOR) 40 MG tablet Take 1 tablet by mouth every evening 90 tablet 3    valsartan (DIOVAN) 320 MG tablet Take 1 tablet by mouth daily 30 tablet 0    metoprolol tartrate (LOPRESSOR) 50 MG tablet Take 1 tablet by mouth 2 times daily 180 tablet 3    empagliflozin (JARDIANCE) 25 MG tablet Take 25 mg by mouth daily 30 tablet 2    furosemide (LASIX) 20 MG tablet Take 1 tablet by mouth daily 90 tablet 3    tadalafil (CIALIS) 20 MG tablet Take 20 mg by mouth as needed for Erectile Dysfunction      insulin glargine (LANTUS;BASAGLAR) 100 UNIT/ML injection pen Inject 30 Units into the skin nightly (Patient taking differently: Inject 45 Units into the skin nightly ) 5 pen 3    Lancets MISC 1 each by Does not apply route 2 times daily 100 each 5    blood glucose monitor strips Test 3  times a day & as needed for symptoms of irregular blood glucose. 100 strip 0    Insulin Pen Needle 30G X 8 MM MISC 1 each by Does not apply route daily 100 each 3     No current facility-administered medications for this visit. Allergies:         Allergies   Allergen Reactions    Lipitor [Atorvastatin] Other (See Comments)     myalgias    Lisinopril Other (See Comments)     Cough    Metformin     Statins         Social History:         Social History     Socioeconomic History    Marital status:      Spouse name: Tray Arora Number of children: 2    Years of education: high school    Highest education level: Not on file   Occupational History    Occupation:  home   Tobacco Use    Smoking status: Former Smoker     Packs/day: 1.00     Years: 26.00     Pack years: 26.00     Types: Cigarettes     Quit date: 3/16/1995     Years since quittin.1    Smokeless tobacco: Never Used   Vaping Use    Vaping Use: Never used   Substance and Sexual Activity    Alcohol use: Yes     Alcohol/week: 4.0 - 12.0 standard drinks     Types: 4 - 12 Cans of beer per week     Comment: occasionally    Drug use: Never    Sexual activity: Yes     Partners: Female   Other Topics Concern    Not on file   Social History Narrative    Not on file     Social Determinants of Health     Financial Resource Strain:     Difficulty of Paying Living Expenses: Not on file   Food Insecurity:     Worried About Running Out of Food in the Last Year: Not on file    Stacey of Food in the Last Year: Not on file   Transportation Needs:     Lack of Transportation (Medical): Not on file    Lack of Transportation (Non-Medical):  Not on file   Physical Activity:     Days of Exercise per Week: Not on file    Minutes of Exercise per Session: Not on file   Stress:     Feeling of Stress : Not on file   Social Connections:     Frequency of Communication with Friends and Family: Not on file    Frequency of Social Gatherings with Friends and Family: Not on file    Attends Temple Services: Not on file    Active Member of [address] [place] [address] or Organizations: Not on file    Attends Club or Organization Meetings: Not on file    Marital Status: Not on file   Intimate Partner Violence:     Fear of Current or Ex-Partner: Not on file    Emotionally Abused: Not on file    Physically Abused: Not on file    Sexually Abused: Not on file   Housing Stability:     Unable to Pay for Housing in the Last Year: Not on file    Number of [place] in the Last Year: Not on file    Unstable Housing in the Last Year: Not on file        Review of Symptoms:    Pertinent items are noted in HPI    Review of systems reviewed from Patient History Form dated on 3/24/2022 and   available in the patient's chart under the Media tab. Vital Signs: There were no vitals filed for this visit. General Exam:     Constitutional: Patient is adequately groomed with no evidence of malnutrition  Mental Status: The patient is oriented to time, place and person. The patient's mood and affect are appropriate. Vascular: Examination reveals no swelling or calf tenderness. Peripheral pulses are palpable and 2+. Lymphatics: no lymphadenopathy of the cervical or axillary regions or upper extremity      Physical Exam: right shoulder      Primary Exam:    Inspection: No deformity atrophy appreciable effusion      Palpation: No focal tenderness      Special Tests: Negative drop arm      Skin: There are no rashes, ulcerations or lesions. Gait: Nonantalgic    Neurovascular - non focal and intact       Additional Comments:        Additional Examinations:                Office Imaging Results/Procedures PerformedToday:            Office Procedures:     Orders Placed This Encounter   Procedures    US GUIDED NEEDLE PLACEMENT     Standing Status:   Future     Number of Occurrences:   1     Standing Expiration Date:   5/9/2023     Order Specific Question:   Reason for exam:     Answer:   GHJ CSI    NE ARTHROCENTESIS ASPIR&/INJ MAJOR JT/BURSA W/O US     US GUIDED NEEDLE PLACEMENT    Result Date: 5/9/2022  Radiology result is complete; follow up with provider / physician office for radiology results      Patient placed in     Lateral decubitus position. Ultrasound placed on Shoulder preset function image optimization obtained. The linear transducer was placed transversely over the posterior glenohumeral joint. After sterile preparation and use of topical anesthetic, a 25-gauge needle was advanced intramuscularly from posterior to anterior using longitudinal technique. A total of 5 cc of Lidocaine was injected.    A  22 GA spinal needle was then advanced in the same needle track and under direct guidance the needle was advanced into the posterior joint recess just distal to the labrum. 1 cc of Depo-Medrol and 3 cc of 0.25% Marcaine was injected and the injectate was visualized to flow within the joint recess. Image stored. Technically successful injection    Positive anesthetic response      Other Outside Imaging and Testing Personally Reviewed:            Assessment   Impression: . Encounter Diagnoses   Name Primary?  Osteoarthritis of glenohumeral joint, right     Right shoulder pain, unspecified chronicity               Plan:     Postinjection protocol  Clinical follow-up with Dr. Ashkan Dillard as scheduled      The nature of the finding, probable diagnosis and likely treatment was thoroughly discussed with the patient. The options, risks, complications, alternative treatment as well as some of the differential diagnosis was discussed. The patient was thoroughly informed and all questions were answered. the patient indicated understanding and satisfaction with the discussion. Orders:        Orders Placed This Encounter   Procedures    US GUIDED NEEDLE PLACEMENT     Standing Status:   Future     Number of Occurrences:   1     Standing Expiration Date:   5/9/2023     Order Specific Question:   Reason for exam:     Answer:   GHJ CSI    VA ARTHROCENTESIS ASPIR&/INJ MAJOR JT/BURSA W/O US           Disclaimer: \"This note was dictated with voice recognition software. Though review and correction are routine, we apologize for any errors. \"

## 2023-05-25 ENCOUNTER — TELEPHONE (OUTPATIENT)
Dept: CARDIOLOGY CLINIC | Age: 68
End: 2023-05-25

## 2023-05-26 ENCOUNTER — OFFICE VISIT (OUTPATIENT)
Dept: CARDIOLOGY CLINIC | Age: 68
End: 2023-05-26

## 2023-05-26 VITALS
OXYGEN SATURATION: 98 % | HEIGHT: 68 IN | DIASTOLIC BLOOD PRESSURE: 70 MMHG | SYSTOLIC BLOOD PRESSURE: 148 MMHG | BODY MASS INDEX: 24.57 KG/M2 | WEIGHT: 162.1 LBS | HEART RATE: 78 BPM

## 2023-05-26 DIAGNOSIS — E78.2 MIXED HYPERLIPIDEMIA: ICD-10-CM

## 2023-05-26 DIAGNOSIS — I10 PRIMARY HYPERTENSION: ICD-10-CM

## 2023-05-26 DIAGNOSIS — I25.5 ISCHEMIC CARDIOMYOPATHY: ICD-10-CM

## 2023-05-26 DIAGNOSIS — I25.10 CORONARY ARTERY DISEASE INVOLVING NATIVE CORONARY ARTERY OF NATIVE HEART WITHOUT ANGINA PECTORIS: Primary | ICD-10-CM

## 2023-05-26 RX ORDER — MESALAMINE 1.2 G/1
2400 TABLET, DELAYED RELEASE ORAL
COMMUNITY

## 2023-05-26 RX ORDER — EZETIMIBE 10 MG/1
10 TABLET ORAL DAILY
COMMUNITY

## 2023-05-26 NOTE — PROGRESS NOTES
been recommended / prescribed for this patient. Education conducted on adverse reactions including bleeding was discussed. Daily weight, low sodium diet were discussed. Patient instructed to call the office with a weight gain: > 3 # over night or 5# in one week; swelling, SOB/orthopnea/PND    The patient verbalizes understanding not to stop medications without discussing with us. Discussed exercise: 30-60 minutes 7 days/week : work related (walks all the time; works at a  home)   Discussed Low saturated fat/SARAH diet. SMBG 200    Thank you for allowing to us to participate in the care of [employer].     VINI Neil    Documentation of today's visit sent to PCP

## 2023-10-25 PROBLEM — I25.10 CORONARY ARTERY DISEASE INVOLVING NATIVE CORONARY ARTERY OF NATIVE HEART WITHOUT ANGINA PECTORIS: Status: ACTIVE | Noted: 2023-10-25

## 2023-10-25 NOTE — PROGRESS NOTES
[facility]   Cardiac Follow Up    Referring Provider:  Mary Garay DO     Chief Complaint   Patient presents with    6 Month Follow-Up     No c/c    Pt stated he can't sleep good at night, he wake up 5-6 times        History of Present Illness:  Mr. Keny Gilbert has a history of coronary artery disease, hypertension, hyperlipidemia, and diabetes mellitus. He presented to [facility] in March 2020 with ACS, hypertensive emergency, and respiratory failure requiring intubation. He was diagnosed with IWMI and acute systolic heart failure. LHC showed three vessel disease, and he underwent CABG x 3/JORGE ALBERTO on 3/24/2020 at [facility]. He had a bout of post-op Atrial fib but was discharged in sinus rhythm. He is a nonsmoker. He was back in the ER at [facility] on 9/25/20 with shortness of breath. Of note, he was not taking his medications as he ran out of refills and did not think he needed to take them anymore. His medications were refilled and was instructed to see his cardiologist.         Today, Mr. Mitesh Haile is here for a follow up. Reports that he has been doing fine but wakes up about 5-6 times a night to urinate. He has not seen a Urologist for this. He has not been checking his BP at home. Continues to work at [employer]. He did take his BP this morning. Denies exertional chest pain, BARBER/PND, palpitations, light-headedness, edema. Past Medical History:   has a past medical history of CAD (coronary artery disease), CHF (congestive heart failure) ([facility]), Diabetes mellitus ([facility]), Hyperlipidemia, Hypertension, S/P CABG x 3, and S/P left atrial appendage ligation. Surgical History:   has a past surgical history that includes Cardiac surgery; Colonoscopy; and Coronary artery bypass graft (N/A, 3/24/2020). Social History:   reports that he quit smoking about 28 years ago. His smoking use included cigarettes. He has a 26.00 pack-year smoking history.  He has never used

## 2023-11-22 ENCOUNTER — OFFICE VISIT (OUTPATIENT)
Dept: CARDIOLOGY CLINIC | Age: 68
End: 2023-11-22
Payer: MEDICARE

## 2023-11-22 VITALS
SYSTOLIC BLOOD PRESSURE: 86 MMHG | WEIGHT: 159.8 LBS | OXYGEN SATURATION: 95 % | HEART RATE: 59 BPM | DIASTOLIC BLOOD PRESSURE: 54 MMHG | HEIGHT: 68 IN | BODY MASS INDEX: 24.22 KG/M2

## 2023-11-22 DIAGNOSIS — E78.2 MIXED HYPERLIPIDEMIA: ICD-10-CM

## 2023-11-22 DIAGNOSIS — I25.10 CORONARY ARTERY DISEASE INVOLVING NATIVE CORONARY ARTERY OF NATIVE HEART WITHOUT ANGINA PECTORIS: Primary | ICD-10-CM

## 2023-11-22 DIAGNOSIS — I10 ESSENTIAL HYPERTENSION: ICD-10-CM

## 2023-11-22 DIAGNOSIS — I25.5 ISCHEMIC CARDIOMYOPATHY: ICD-10-CM

## 2023-11-22 PROCEDURE — 1123F ACP DISCUSS/DSCN MKR DOCD: CPT | Performed by: INTERNAL MEDICINE

## 2023-11-22 PROCEDURE — 3074F SYST BP LT 130 MM HG: CPT | Performed by: INTERNAL MEDICINE

## 2023-11-22 PROCEDURE — 3017F COLORECTAL CA SCREEN DOC REV: CPT | Performed by: INTERNAL MEDICINE

## 2023-11-22 PROCEDURE — 99214 OFFICE O/P EST MOD 30 MIN: CPT | Performed by: INTERNAL MEDICINE

## 2023-11-22 PROCEDURE — G8484 FLU IMMUNIZE NO ADMIN: HCPCS | Performed by: INTERNAL MEDICINE

## 2023-11-22 PROCEDURE — 1036F TOBACCO NON-USER: CPT | Performed by: INTERNAL MEDICINE

## 2023-11-22 PROCEDURE — G8420 CALC BMI NORM PARAMETERS: HCPCS | Performed by: INTERNAL MEDICINE

## 2023-11-22 PROCEDURE — 3078F DIAST BP <80 MM HG: CPT | Performed by: INTERNAL MEDICINE

## 2023-11-22 PROCEDURE — G8427 DOCREV CUR MEDS BY ELIG CLIN: HCPCS | Performed by: INTERNAL MEDICINE

## 2023-11-22 RX ORDER — VALSARTAN 160 MG/1
320 TABLET ORAL DAILY
Qty: 90 TABLET | Refills: 3 | Status: SHIPPED | OUTPATIENT
Start: 2023-11-22

## 2023-11-22 NOTE — PATIENT INSTRUCTIONS
Stop Lasix  Decrease Valsartan down to 160mg - can break the 325mg in half  Follow up with Ryan Ojeda NP in 6 months  Follow up with Dr. Zaire Hopkins in 1 year

## 2024-05-22 ENCOUNTER — OFFICE VISIT (OUTPATIENT)
Dept: CARDIOLOGY CLINIC | Age: 69
End: 2024-05-22
Payer: MEDICARE

## 2024-05-22 VITALS
WEIGHT: 152 LBS | DIASTOLIC BLOOD PRESSURE: 64 MMHG | HEART RATE: 90 BPM | BODY MASS INDEX: 23.86 KG/M2 | SYSTOLIC BLOOD PRESSURE: 120 MMHG | OXYGEN SATURATION: 98 % | HEIGHT: 67 IN

## 2024-05-22 DIAGNOSIS — E78.2 MIXED HYPERLIPIDEMIA: ICD-10-CM

## 2024-05-22 DIAGNOSIS — I25.10 CORONARY ARTERY DISEASE INVOLVING NATIVE CORONARY ARTERY OF NATIVE HEART WITHOUT ANGINA PECTORIS: Primary | ICD-10-CM

## 2024-05-22 DIAGNOSIS — I10 PRIMARY HYPERTENSION: ICD-10-CM

## 2024-05-22 DIAGNOSIS — I25.5 ISCHEMIC CARDIOMYOPATHY: ICD-10-CM

## 2024-05-22 PROCEDURE — 3078F DIAST BP <80 MM HG: CPT | Performed by: NURSE PRACTITIONER

## 2024-05-22 PROCEDURE — 1123F ACP DISCUSS/DSCN MKR DOCD: CPT | Performed by: NURSE PRACTITIONER

## 2024-05-22 PROCEDURE — 99214 OFFICE O/P EST MOD 30 MIN: CPT | Performed by: NURSE PRACTITIONER

## 2024-05-22 PROCEDURE — G8427 DOCREV CUR MEDS BY ELIG CLIN: HCPCS | Performed by: NURSE PRACTITIONER

## 2024-05-22 PROCEDURE — 1036F TOBACCO NON-USER: CPT | Performed by: NURSE PRACTITIONER

## 2024-05-22 PROCEDURE — 3074F SYST BP LT 130 MM HG: CPT | Performed by: NURSE PRACTITIONER

## 2024-05-22 PROCEDURE — 3017F COLORECTAL CA SCREEN DOC REV: CPT | Performed by: NURSE PRACTITIONER

## 2024-05-22 PROCEDURE — G8420 CALC BMI NORM PARAMETERS: HCPCS | Performed by: NURSE PRACTITIONER

## 2024-05-22 RX ORDER — FUROSEMIDE 20 MG/1
20 TABLET ORAL DAILY
COMMUNITY
Start: 2020-09-28

## 2024-05-22 RX ORDER — VALSARTAN 320 MG/1
320 TABLET ORAL DAILY
COMMUNITY

## 2024-05-22 NOTE — PROGRESS NOTES
[facility]     Outpatient Follow Up Note    Mart Donald is 69 y.o. male who presents today with a history of IWMI / CAD s/p CABG with JORGE ALBERTO clip  and post-op AF; ischemic CM/EF 25-30%, CHF (pre-op) and HTN.    ~please see note for detailed presentation on 3/11/20  ~episode of decompensation  when ran out of medicines    CHIEF COMPLAINT / HPI:  Follow Up secondary to CAD    Subjective:     He's had no chest discomfort. He recalls his Lt arm hurting before having his heart attack. He denies recurrence. He has no SOB. The patient denies orthopnea/PND. The patient does not have swelling. The patients weight is stable ~ 150-60#. The patient is not experiencing palpitations. He denies dizziness.     These symptoms are stable since the last OV.     With regard to medication therapy the patient has been compliant with prescribed regimen. They have tolerated therapy to date.     Past Medical History:   Diagnosis Date    CAD (coronary artery disease)     CHF (congestive heart failure) (HCC)     Diabetes mellitus (HCC)     Hyperlipidemia     Hypertension     S/P CABG x 3 2020    done urgently    S/P left atrial appendage ligation 2020     Social History:    Social History     Tobacco Use   Smoking Status Former    Current packs/day: 0.00    Average packs/day: 1 pack/day for 26.0 years (26.0 ttl pk-yrs)    Types: Cigarettes    Start date: 3/16/1969    Quit date: 3/16/1995    Years since quittin.2   Smokeless Tobacco Never       Current Outpatient Medications   Medication Sig Dispense Refill    furosemide (LASIX) 20 MG tablet Take 1 tablet by mouth daily      valsartan (DIOVAN) 320 MG tablet Take 1 tablet by mouth daily      ezetimibe (ZETIA) 10 MG tablet Take 1 tablet by mouth daily      mesalamine (LIALDA) 1.2 g EC tablet Take 1 tablet by mouth daily (with breakfast) 1200mg Take 2 tabs qd      aspirin 81 MG EC tablet Take 1 tablet by mouth daily      rosuvastatin (CRESTOR) 40 MG

## (undated) DEVICE — SUTURE PROL SZ 7-0 L24IN NONABSORBABLE BLU L8MM BV175-6 3/8 8735H

## (undated) DEVICE — FAIRFIELD CABG ACCESSARY PK

## (undated) DEVICE — CONNECTOR PERF W3/8XH0.25XL0.25IN BASE UNEQUAL Y SHP W/O

## (undated) DEVICE — SUTURE ETHBND EXCEL SZ 2-0 L36IN NONABSORBABLE GRN L26MM SH X523H

## (undated) DEVICE — SUTURE ETHBND EXCEL SZ 0 L18IN NONABSORBABLE GRN L36MM CT-1 CX21D

## (undated) DEVICE — SUTURE NONABSORBABLE MONOFILAMENT 6-0 C-1 1X30 IN PROLENE 8706H

## (undated) DEVICE — STERNUM BLADE, OFFSET (31.7 X 0.64 X 6.3MM)

## (undated) DEVICE — EXTENSION SET, 2 INJECTION SITES, MALE LUER LOCK ADAPTER WITH RETRACTABLE COLLAR: Brand: INTERLINK

## (undated) DEVICE — 3 ML SYRINGE LUER-LOCK TIP: Brand: MONOJECT

## (undated) DEVICE — HYPODERMIC SAFETY NEEDLE: Brand: MAGELLAN

## (undated) DEVICE — 12 FOOT DISPOSABLE EXTENSION CABLE WITH SAFE CONNECT / SCREW-DOWN

## (undated) DEVICE — 35 ML SYRINGE LUER-LOCK TIP: Brand: MONOJECT

## (undated) DEVICE — SET ADMIN PRIMING 67ML L105IN NVENT 180UM FLTR 3 RLER CLMP

## (undated) DEVICE — SPONGE GZ L3FTXW2IN COT VAG XR DTECT 4 PLY MESH PK RL

## (undated) DEVICE — BLANKET WRM CARD FOR MISTRAL AIR WRM SYS

## (undated) DEVICE — TTL1LYR 16FR10ML 100%SIL TMPST TR: Brand: MEDLINE

## (undated) DEVICE — BANDAGE ADH W4XL13 3 4IN POSTOP DSG PRIMAPORE

## (undated) DEVICE — DRESSING WND SM W2.5XL4IN BRTH W/ CATH SECUREMENT AND

## (undated) DEVICE — PAD THRM M SPL TORSO

## (undated) DEVICE — GOWN SIRUS NONREIN XL W/TWL: Brand: MEDLINE INDUSTRIES, INC.

## (undated) DEVICE — KIT ART LN 20GA L12CM FEP RADPQ 0.025X13.75IN SPR GWIRE

## (undated) DEVICE — PROCEDURE KIT COMP

## (undated) DEVICE — 20 ML SYRINGE LUER-LOCK TIP: Brand: MONOJECT

## (undated) DEVICE — EVERGRIP INSERT SET 61MM: Brand: FOGARTY EVERGRIP

## (undated) DEVICE — CONTAINER STOR SURG SLUSH

## (undated) DEVICE — SUTURE PROL SZ 7 0 L24IN NONABSORBABLE BLU BV175 6 L8MM 3 8 EP8735H

## (undated) DEVICE — Z DISCONTINUED USE 2516375 APPLICATOR MEDICATED 3 CC CLR STRL CHLORAPREP

## (undated) DEVICE — LABEL MED CUST CVR

## (undated) DEVICE — GLOVE SURG SZ 75 L12IN FNGR THK94MIL STD WHT LTX FREE

## (undated) DEVICE — SUTURE PERMAHAND SZ 3-0 L30IN NONABSORBABLE BLK SH L26MM K832H

## (undated) DEVICE — SUTURE VCRL SZ 4-0 L18IN ABSRB UD L19MM PS-2 3/8 CIR PRIM J496H

## (undated) DEVICE — SYSTEM ENDOSCP VES HARV W/ TOOL CANN SEAL SHT PRT BLNT TIP

## (undated) DEVICE — AORTIC PNCH 4.0MM 8IN BX 10 DISP

## (undated) DEVICE — EZE-BAND LF ST 4X5.5 36/CS: Brand: EZE-BAND LF ST 4X5.5 36/CS

## (undated) DEVICE — ELECTRODE PT RET AD L9FT HI MOIST COND ADH HYDRGEL CORDED

## (undated) DEVICE — CANNULA VES L25IN RADPQ BODY W  1 W VLV 3MM BLNT TIP DLP

## (undated) DEVICE — DRAPE THER FLUID WARMING 66X44 IN FLAT SLUSH DBL DISC ORS

## (undated) DEVICE — 3M™ STERI-STRIP™ REINFORCED ADHESIVE SKIN CLOSURES, R1547, 1/2 IN X 4 IN (12 MM X 100 MM), 6 STRIPS/ENVELOPE: Brand: 3M™ STERI-STRIP™

## (undated) DEVICE — SUTURE PERMA-HAND SZ 4-0 L144IN NONABSORBABLE BLK LIGAPAK LA53G

## (undated) DEVICE — SUTURE PDS II SZ 0 L27IN ABSRB VLT L36MM CT-1 1/2 CIR Z340H

## (undated) DEVICE — GLOVE SURG SZ 85 L12IN FNGR THK13MIL BRN LTX SYN POLYMER W

## (undated) DEVICE — DRAIN SURG SGL COLL PT TB FOR ATS BG OASIS

## (undated) DEVICE — WAX SURG 2.5GM HEMSTAT BNE BEESWAX PARAFFIN ISO PALMITATE

## (undated) DEVICE — SYRINGE MED 5ML STD CLR PLAS LUERLOCK TIP N CTRL DISP

## (undated) DEVICE — INDICATED FOR SURGICAL CLAMPING DURING CARDIOVASCULAR PERIPHERAL VASCULAR, AND GENERAL SURGERY.: Brand: SOFT/FIBRA® SPRING CLIP

## (undated) DEVICE — CLIP SM RED INTERN HMOCLP TITAN LIGATING

## (undated) DEVICE — GLOVE SURG SZ 8 L11.77IN FNGR THK9.8MIL STRW LTX POLYMER

## (undated) DEVICE — SUTURE PERMAHAND SZ 2-0 L30IN NONABSORBABLE BLK SILK W/O A305H

## (undated) DEVICE — BOWL MED L 32OZ PLAS W/ MOLD GRAD EZ OPN PEEL PCH

## (undated) DEVICE — DECANTER FLD 9IN ST BG FOR ASEP TRNSF OF FLD

## (undated) DEVICE — DRAIN SURG 24FR BLAK SIL HUBLESS 4 CHANNELED RADPQ EXTN TB

## (undated) DEVICE — SUTURE SZ 7 L18IN NONABSORBABLE SIL CCS L48MM 1/2 CIR STRNM M655G

## (undated) DEVICE — CANNULA AORT ROOT STD INTRO SLT TIP 7FR OVERALL LEN 575IN DL

## (undated) DEVICE — NEEDLE HYPO 20GA L1.5IN YEL POLYPR HUB S STL REG BVL STR

## (undated) DEVICE — Z INACTIVE USE 2540311 LEAD PACE L475MM CHN A OR V MYOCARDIAL STEROID ELUT SIL

## (undated) DEVICE — MEDI-VAC NON-CONDUCTIVE SUCTION TUBING: Brand: CARDINAL HEALTH

## (undated) DEVICE — SUTURE VCRL SZ 2-0 L36IN ABSRB UD L36MM CT-1 1/2 CIR J945H

## (undated) DEVICE — BANDAGE COMPR W6INXL10YD ST M E WHITE/BEIGE

## (undated) DEVICE — SOLUTION IV IRRIG POUR BRL 0.9% SODIUM CHL 2F7124

## (undated) DEVICE — BASIC SINGLE BASIN 1-LF: Brand: MEDLINE INDUSTRIES, INC.

## (undated) DEVICE — PAD SURG INSUL AD L CLS CELL FOAM SLT W  TIED RADPQ MRK FOR

## (undated) DEVICE — NEEDLE HYPO 18GA L1.5IN THN WALL PIVOTING SHLD BVL ORIENTED

## (undated) DEVICE — SUTURE NONABSORBABLE MONOFILAMENT 4-0 RB-1 36 IN BLU PROLENE 8557H

## (undated) DEVICE — PRESSURE MONITORING SET: Brand: TRUWAVE, VAMP PLUS

## (undated) DEVICE — APPLICATOR PREP 26ML 0.7% IOD POVACRYLEX 74% ISO ALC ST

## (undated) DEVICE — DRESSING GERM DIA1IN CNTR H DIA7MM BLU CHG ANTIMIC PROTCT

## (undated) DEVICE — OPEN HRT BASIC PK

## (undated) DEVICE — DRESSING TRNSPAR W FAB BORD SORBAVIEW ULT 475X425IN

## (undated) DEVICE — CATH CTR VEN 3LUM INTRLNK INJ 9FRX11CM

## (undated) DEVICE — SUTURE ETHBND SZ 3-0 L30IN NONABSORBABLE GRN SH L26MM 1/2 X562H

## (undated) DEVICE — SWAN-GANZ THERMODILUTION CATHETER: Brand: SWAN-GANZ

## (undated) DEVICE — CANNULA PERF L15IN DIA29FR VEN 3 STG THN WALL DSGN W  VENT

## (undated) DEVICE — CANNULA ART 24FR OVERALL L105IN VENT CONN 3 8IN MTL TIP W

## (undated) DEVICE — GAUZE,SPONGE,4"X4",8PLY,STRL,LF,10/TRAY: Brand: MEDLINE

## (undated) DEVICE — [HIGH FLOW INSUFFLATOR,  DO NOT USE IF PACKAGE IS DAMAGED,  KEEP DRY,  KEEP AWAY FROM SUNLIGHT,  PROTECT FROM HEAT AND RADIOACTIVE SOURCES.]: Brand: PNEUMOSURE

## (undated) DEVICE — SUTURE NONABSORBABLE MONOFILAMENT 7-0 BV-1 1X24 IN PROLENE 8702H

## (undated) DEVICE — TOWEL 26X17IN 2 PER PACK COTTON DELINTED

## (undated) DEVICE — KIT OR ROOM TURNOVER W/STRAP

## (undated) DEVICE — SUTURE VCRL SZ 3-0 L27IN ABSRB UD L26MM SH 1/2 CIR J416H

## (undated) DEVICE — 3M™ TEGADERM™ TRANSPARENT FILM DRESSING FRAME STYLE, 1626W, 4 IN X 4-3/4 IN (10 CM X 12 CM), 50/CT 4CT/CASE: Brand: 3M™ TEGADERM™